# Patient Record
Sex: MALE | Race: OTHER | NOT HISPANIC OR LATINO | URBAN - METROPOLITAN AREA
[De-identification: names, ages, dates, MRNs, and addresses within clinical notes are randomized per-mention and may not be internally consistent; named-entity substitution may affect disease eponyms.]

---

## 2017-06-24 PROBLEM — Z00.00 ENCOUNTER FOR PREVENTIVE HEALTH EXAMINATION: Status: ACTIVE | Noted: 2017-06-24

## 2019-07-12 ENCOUNTER — INPATIENT (INPATIENT)
Facility: HOSPITAL | Age: 69
LOS: 4 days | Discharge: HOME | End: 2019-07-17
Attending: INTERNAL MEDICINE | Admitting: INTERNAL MEDICINE
Payer: MEDICAID

## 2019-07-12 VITALS
SYSTOLIC BLOOD PRESSURE: 122 MMHG | WEIGHT: 175.93 LBS | OXYGEN SATURATION: 99 % | TEMPERATURE: 97 F | HEART RATE: 48 BPM | RESPIRATION RATE: 20 BRPM | DIASTOLIC BLOOD PRESSURE: 61 MMHG

## 2019-07-12 DIAGNOSIS — Z95.1 PRESENCE OF AORTOCORONARY BYPASS GRAFT: Chronic | ICD-10-CM

## 2019-07-12 DIAGNOSIS — R09.89 OTHER SPECIFIED SYMPTOMS AND SIGNS INVOLVING THE CIRCULATORY AND RESPIRATORY SYSTEMS: ICD-10-CM

## 2019-07-12 LAB
ALBUMIN SERPL ELPH-MCNC: 3.7 G/DL — SIGNIFICANT CHANGE UP (ref 3.5–5.2)
ALP SERPL-CCNC: 82 U/L — SIGNIFICANT CHANGE UP (ref 30–115)
ALT FLD-CCNC: 19 U/L — SIGNIFICANT CHANGE UP (ref 0–41)
ANION GAP SERPL CALC-SCNC: 11 MMOL/L — SIGNIFICANT CHANGE UP (ref 7–14)
ANION GAP SERPL CALC-SCNC: 11 MMOL/L — SIGNIFICANT CHANGE UP (ref 7–14)
APPEARANCE UR: CLEAR — SIGNIFICANT CHANGE UP
APTT BLD: 29.8 SEC — SIGNIFICANT CHANGE UP (ref 27–39.2)
AST SERPL-CCNC: 16 U/L — SIGNIFICANT CHANGE UP (ref 0–41)
BACTERIA # UR AUTO: ABNORMAL
BASOPHILS # BLD AUTO: 0.03 K/UL — SIGNIFICANT CHANGE UP (ref 0–0.2)
BASOPHILS NFR BLD AUTO: 0.4 % — SIGNIFICANT CHANGE UP (ref 0–1)
BILIRUB SERPL-MCNC: 0.5 MG/DL — SIGNIFICANT CHANGE UP (ref 0.2–1.2)
BILIRUB UR-MCNC: NEGATIVE — SIGNIFICANT CHANGE UP
BUN SERPL-MCNC: 24 MG/DL — HIGH (ref 10–20)
BUN SERPL-MCNC: 32 MG/DL — HIGH (ref 10–20)
CALCIUM SERPL-MCNC: 8.6 MG/DL — SIGNIFICANT CHANGE UP (ref 8.5–10.1)
CALCIUM SERPL-MCNC: 9 MG/DL — SIGNIFICANT CHANGE UP (ref 8.5–10.1)
CHLORIDE SERPL-SCNC: 105 MMOL/L — SIGNIFICANT CHANGE UP (ref 98–110)
CHLORIDE SERPL-SCNC: 106 MMOL/L — SIGNIFICANT CHANGE UP (ref 98–110)
CO2 SERPL-SCNC: 26 MMOL/L — SIGNIFICANT CHANGE UP (ref 17–32)
CO2 SERPL-SCNC: 28 MMOL/L — SIGNIFICANT CHANGE UP (ref 17–32)
COLOR SPEC: SIGNIFICANT CHANGE UP
CREAT SERPL-MCNC: 0.8 MG/DL — SIGNIFICANT CHANGE UP (ref 0.7–1.5)
CREAT SERPL-MCNC: 1 MG/DL — SIGNIFICANT CHANGE UP (ref 0.7–1.5)
DIFF PNL FLD: ABNORMAL
EOSINOPHIL # BLD AUTO: 0.38 K/UL — SIGNIFICANT CHANGE UP (ref 0–0.7)
EOSINOPHIL NFR BLD AUTO: 4.9 % — SIGNIFICANT CHANGE UP (ref 0–8)
EPI CELLS # UR: SIGNIFICANT CHANGE UP /HPF (ref 0–5)
GLUCOSE BLDC GLUCOMTR-MCNC: 109 MG/DL — HIGH (ref 70–99)
GLUCOSE BLDC GLUCOMTR-MCNC: 110 MG/DL — HIGH (ref 70–99)
GLUCOSE SERPL-MCNC: 120 MG/DL — HIGH (ref 70–99)
GLUCOSE SERPL-MCNC: 92 MG/DL — SIGNIFICANT CHANGE UP (ref 70–99)
GLUCOSE UR QL: NEGATIVE — SIGNIFICANT CHANGE UP
HCT VFR BLD CALC: 37.8 % — LOW (ref 42–52)
HCT VFR BLD CALC: 42.1 % — SIGNIFICANT CHANGE UP (ref 42–52)
HGB BLD-MCNC: 12.4 G/DL — LOW (ref 14–18)
HGB BLD-MCNC: 14 G/DL — SIGNIFICANT CHANGE UP (ref 14–18)
IMM GRANULOCYTES NFR BLD AUTO: 0.1 % — SIGNIFICANT CHANGE UP (ref 0.1–0.3)
INR BLD: 1.03 RATIO — SIGNIFICANT CHANGE UP (ref 0.65–1.3)
KETONES UR-MCNC: NEGATIVE — SIGNIFICANT CHANGE UP
LEUKOCYTE ESTERASE UR-ACNC: NEGATIVE — SIGNIFICANT CHANGE UP
LYMPHOCYTES # BLD AUTO: 3.52 K/UL — HIGH (ref 1.2–3.4)
LYMPHOCYTES # BLD AUTO: 45 % — SIGNIFICANT CHANGE UP (ref 20.5–51.1)
MAGNESIUM SERPL-MCNC: 1.8 MG/DL — SIGNIFICANT CHANGE UP (ref 1.8–2.4)
MCHC RBC-ENTMCNC: 30.5 PG — SIGNIFICANT CHANGE UP (ref 27–31)
MCHC RBC-ENTMCNC: 30.8 PG — SIGNIFICANT CHANGE UP (ref 27–31)
MCHC RBC-ENTMCNC: 32.8 G/DL — SIGNIFICANT CHANGE UP (ref 32–37)
MCHC RBC-ENTMCNC: 33.3 G/DL — SIGNIFICANT CHANGE UP (ref 32–37)
MCV RBC AUTO: 92.7 FL — SIGNIFICANT CHANGE UP (ref 80–94)
MCV RBC AUTO: 92.9 FL — SIGNIFICANT CHANGE UP (ref 80–94)
MONOCYTES # BLD AUTO: 0.91 K/UL — HIGH (ref 0.1–0.6)
MONOCYTES NFR BLD AUTO: 11.6 % — HIGH (ref 1.7–9.3)
NEUTROPHILS # BLD AUTO: 2.98 K/UL — SIGNIFICANT CHANGE UP (ref 1.4–6.5)
NEUTROPHILS NFR BLD AUTO: 38 % — LOW (ref 42.2–75.2)
NITRITE UR-MCNC: NEGATIVE — SIGNIFICANT CHANGE UP
NRBC # BLD: 0 /100 WBCS — SIGNIFICANT CHANGE UP (ref 0–0)
NRBC # BLD: 0 /100 WBCS — SIGNIFICANT CHANGE UP (ref 0–0)
PH UR: 6.5 — SIGNIFICANT CHANGE UP (ref 5–8)
PLATELET # BLD AUTO: 179 K/UL — SIGNIFICANT CHANGE UP (ref 130–400)
PLATELET # BLD AUTO: 207 K/UL — SIGNIFICANT CHANGE UP (ref 130–400)
POTASSIUM SERPL-MCNC: 4.6 MMOL/L — SIGNIFICANT CHANGE UP (ref 3.5–5)
POTASSIUM SERPL-MCNC: 4.6 MMOL/L — SIGNIFICANT CHANGE UP (ref 3.5–5)
POTASSIUM SERPL-SCNC: 4.6 MMOL/L — SIGNIFICANT CHANGE UP (ref 3.5–5)
POTASSIUM SERPL-SCNC: 4.6 MMOL/L — SIGNIFICANT CHANGE UP (ref 3.5–5)
PROT SERPL-MCNC: 6.7 G/DL — SIGNIFICANT CHANGE UP (ref 6–8)
PROT UR-MCNC: NEGATIVE — SIGNIFICANT CHANGE UP
PROTHROM AB SERPL-ACNC: 11.8 SEC — SIGNIFICANT CHANGE UP (ref 9.95–12.87)
RBC # BLD: 4.07 M/UL — LOW (ref 4.7–6.1)
RBC # BLD: 4.54 M/UL — LOW (ref 4.7–6.1)
RBC # FLD: 12.6 % — SIGNIFICANT CHANGE UP (ref 11.5–14.5)
RBC # FLD: 12.6 % — SIGNIFICANT CHANGE UP (ref 11.5–14.5)
RBC CASTS # UR COMP ASSIST: SIGNIFICANT CHANGE UP /HPF (ref 0–4)
SODIUM SERPL-SCNC: 143 MMOL/L — SIGNIFICANT CHANGE UP (ref 135–146)
SODIUM SERPL-SCNC: 144 MMOL/L — SIGNIFICANT CHANGE UP (ref 135–146)
SP GR SPEC: 1.03 — HIGH (ref 1.01–1.02)
TROPONIN T SERPL-MCNC: <0.01 NG/ML — SIGNIFICANT CHANGE UP
UROBILINOGEN FLD QL: SIGNIFICANT CHANGE UP
WBC # BLD: 7.83 K/UL — SIGNIFICANT CHANGE UP (ref 4.8–10.8)
WBC # BLD: 8.52 K/UL — SIGNIFICANT CHANGE UP (ref 4.8–10.8)
WBC # FLD AUTO: 7.83 K/UL — SIGNIFICANT CHANGE UP (ref 4.8–10.8)
WBC # FLD AUTO: 8.52 K/UL — SIGNIFICANT CHANGE UP (ref 4.8–10.8)

## 2019-07-12 PROCEDURE — 71045 X-RAY EXAM CHEST 1 VIEW: CPT | Mod: 26

## 2019-07-12 PROCEDURE — 70450 CT HEAD/BRAIN W/O DYE: CPT | Mod: 26

## 2019-07-12 PROCEDURE — 64644 CHEMODENERV 1 EXTREM 5/> MUS: CPT

## 2019-07-12 PROCEDURE — 76937 US GUIDE VASCULAR ACCESS: CPT | Mod: 26

## 2019-07-12 PROCEDURE — 0042T: CPT

## 2019-07-12 PROCEDURE — 70450 CT HEAD/BRAIN W/O DYE: CPT | Mod: 26,77

## 2019-07-12 PROCEDURE — 99291 CRITICAL CARE FIRST HOUR: CPT

## 2019-07-12 PROCEDURE — 99285 EMERGENCY DEPT VISIT HI MDM: CPT

## 2019-07-12 RX ORDER — BISOPROLOL FUMARATE 10 MG/1
0 TABLET, FILM COATED ORAL
Qty: 30 | Refills: 0 | DISCHARGE

## 2019-07-12 RX ORDER — CHLORHEXIDINE GLUCONATE 213 G/1000ML
1 SOLUTION TOPICAL
Refills: 0 | Status: DISCONTINUED | OUTPATIENT
Start: 2019-07-12 | End: 2019-07-17

## 2019-07-12 RX ORDER — SODIUM CHLORIDE 9 MG/ML
250 INJECTION, SOLUTION INTRAVENOUS
Refills: 0 | Status: DISCONTINUED | OUTPATIENT
Start: 2019-07-12 | End: 2019-07-14

## 2019-07-12 RX ORDER — THIAMINE MONONITRATE (VIT B1) 100 MG
100 TABLET ORAL DAILY
Refills: 0 | Status: COMPLETED | OUTPATIENT
Start: 2019-07-12 | End: 2019-07-15

## 2019-07-12 RX ORDER — SPIRONOLACTONE 25 MG/1
0 TABLET, FILM COATED ORAL
Qty: 30 | Refills: 0 | DISCHARGE

## 2019-07-12 RX ORDER — PANTOPRAZOLE SODIUM 20 MG/1
40 TABLET, DELAYED RELEASE ORAL DAILY
Refills: 0 | Status: DISCONTINUED | OUTPATIENT
Start: 2019-07-12 | End: 2019-07-16

## 2019-07-12 RX ORDER — SODIUM CHLORIDE 9 MG/ML
500 INJECTION INTRAMUSCULAR; INTRAVENOUS; SUBCUTANEOUS ONCE
Refills: 0 | Status: COMPLETED | OUTPATIENT
Start: 2019-07-12 | End: 2019-07-12

## 2019-07-12 RX ORDER — ATORVASTATIN CALCIUM 80 MG/1
80 TABLET, FILM COATED ORAL AT BEDTIME
Refills: 0 | Status: DISCONTINUED | OUTPATIENT
Start: 2019-07-12 | End: 2019-07-16

## 2019-07-12 RX ORDER — CEFAZOLIN SODIUM 1 G
2000 VIAL (EA) INJECTION ONCE
Refills: 0 | Status: COMPLETED | OUTPATIENT
Start: 2019-07-12 | End: 2019-07-12

## 2019-07-12 RX ORDER — ASPIRIN/CALCIUM CARB/MAGNESIUM 324 MG
0 TABLET ORAL
Qty: 30 | Refills: 0 | DISCHARGE

## 2019-07-12 RX ORDER — SODIUM CHLORIDE 9 MG/ML
1000 INJECTION, SOLUTION INTRAVENOUS
Refills: 0 | Status: DISCONTINUED | OUTPATIENT
Start: 2019-07-12 | End: 2019-07-12

## 2019-07-12 RX ORDER — ATORVASTATIN CALCIUM 80 MG/1
0 TABLET, FILM COATED ORAL
Qty: 30 | Refills: 0 | DISCHARGE

## 2019-07-12 RX ORDER — SODIUM CHLORIDE 9 MG/ML
1000 INJECTION INTRAMUSCULAR; INTRAVENOUS; SUBCUTANEOUS
Refills: 0 | Status: DISCONTINUED | OUTPATIENT
Start: 2019-07-12 | End: 2019-07-12

## 2019-07-12 RX ORDER — SODIUM CHLORIDE 9 MG/ML
1000 INJECTION INTRAMUSCULAR; INTRAVENOUS; SUBCUTANEOUS
Refills: 0 | Status: DISCONTINUED | OUTPATIENT
Start: 2019-07-12 | End: 2019-07-14

## 2019-07-12 RX ORDER — ASPIRIN/CALCIUM CARB/MAGNESIUM 324 MG
325 TABLET ORAL DAILY
Refills: 0 | Status: DISCONTINUED | OUTPATIENT
Start: 2019-07-12 | End: 2019-07-17

## 2019-07-12 RX ADMIN — SODIUM CHLORIDE 75 MILLILITER(S): 9 INJECTION INTRAMUSCULAR; INTRAVENOUS; SUBCUTANEOUS at 21:01

## 2019-07-12 RX ADMIN — ATORVASTATIN CALCIUM 80 MILLIGRAM(S): 80 TABLET, FILM COATED ORAL at 21:01

## 2019-07-12 RX ADMIN — PANTOPRAZOLE SODIUM 40 MILLIGRAM(S): 20 TABLET, DELAYED RELEASE ORAL at 14:16

## 2019-07-12 RX ADMIN — SODIUM CHLORIDE 1000 MILLILITER(S): 9 INJECTION INTRAMUSCULAR; INTRAVENOUS; SUBCUTANEOUS at 21:04

## 2019-07-12 RX ADMIN — SODIUM CHLORIDE 75 MILLILITER(S): 9 INJECTION INTRAMUSCULAR; INTRAVENOUS; SUBCUTANEOUS at 14:15

## 2019-07-12 RX ADMIN — SODIUM CHLORIDE 12.5 MILLILITER(S): 9 INJECTION, SOLUTION INTRAVENOUS at 21:04

## 2019-07-12 RX ADMIN — Medication 100 MILLIGRAM(S): at 16:11

## 2019-07-12 NOTE — ED PROVIDER NOTE - OBJECTIVE STATEMENT
pt is a 68 yom w/ no pmhx BIBA for stroke sx. pt last known well is unknown. pt family heard thump ustairs, found pt in bathtub, could not ambulate afterwards, called 911. Danish speaking. pt has L facial droop, R gaze, L sided weakness.

## 2019-07-12 NOTE — ED PROVIDER NOTE - PHYSICAL EXAMINATION
CONSTITUTIONAL: Well-developed; well-nourished; in no acute distress.   SKIN: warm, dry  HEAD: Normocephalic; atraumatic.  EYES: PERRL, EOMI, normal sclera and conjunctiva   ENT: No nasal discharge; airway clear.  NECK: Supple; non tender.  CARD: S1, S2 normal; no murmurs, gallops, or rubs. Regular rate and rhythm.   RESP: No wheezes, rales or rhonchi.  ABD: soft ntnd  EXT: Normal ROM.  No clubbing, cyanosis or edema.   LYMPH: No acute cervical adenopathy.  NEURO: Alert, oriented. 3/5 strength in left upper and lower extremities, now resolving. L facial droop. slurred speech. sensation deficits on the L face. no cerebellar signs.  PSYCH: Cooperative, appropriate.

## 2019-07-12 NOTE — ED ADULT TRIAGE NOTE - CHIEF COMPLAINT QUOTE
As per EMS, patient fell 20 minutes ago. +facial droop noted. As per wife, patient altered. Stroke code called in ED. Patient Romansh speaking.

## 2019-07-12 NOTE — H&P ADULT - NSHPLABSRESULTS_GEN_ALL_CORE
14.0   7.83  )-----------( 207      ( 12 Jul 2019 07:10 )             42.1       07-12    144  |  105  |  32<H>  ----------------------------<  120<H>  4.6   |  28  |  1.0    Ca    9.0      12 Jul 2019 07:10    TPro  6.7  /  Alb  3.7  /  TBili  0.5  /  DBili  x   /  AST  16  /  ALT  19  /  AlkPhos  82  07-12        PT/INR - ( 12 Jul 2019 07:10 )   PT: 11.80 sec;   INR: 1.03 ratio         PTT - ( 12 Jul 2019 07:10 )  PTT:29.8 sec    Lactate Trend      CARDIAC MARKERS ( 12 Jul 2019 07:10 )  x     / <0.01 ng/mL / x     / x     / x          CAPILLARY BLOOD GLUCOSE  104 (12 Jul 2019 07:33)    POCT Blood Glucose.: 104 mg/dL (12 Jul 2019 07:14)      < from: CT Head No Cont (07.12.19 @ 11:04) >    IMPRESSION:     1.  Interval cerebral angiogram with residual IV contrast present. Mild   focal prominence of a right sylvian fissure branch vessel, likely   contrast staining in/around the vessel. Recommend a follow-up study.    2.  No evidence of acute large territory infarct.    < end of copied text >    < from: CT Perfusion w/ Maps w/ IV Cont (07.12.19 @ 07:55) >      Impression:    CTA head: Abrupt occlusion of the distal M1 segment of the right middle   cerebral artery compatible with thromboembolus.    CT perfusion: Large penumbra in the right MCA distribution.    CTA neck: No significant vascular stenosis in the neck.      < end of copied text >

## 2019-07-12 NOTE — ED PROVIDER NOTE - CLINICAL SUMMARY MEDICAL DECISION MAKING FREE TEXT BOX
Pt sx waxing and waning during ED stay with neuro at bedside. Family wanted to speak with PMD prior to consenting for thrombectomy, Dr Santos Phan spoke with PMD, pt went for procedure.

## 2019-07-12 NOTE — CONSULT NOTE ADULT - SUBJECTIVE AND OBJECTIVE BOX
Patient is a 68y old  Male who presents with a chief complaint of     HPI:  69 yo M w/ PMH CAD s/p CABG, CVA, DVT likely provoked BIBA for stroke sx. pt last known well is unknown. pt family heard thump ustairs, found pt in bathtub, could not ambulate afterwards, called 911. Mexican speaking. pt has L facial droop, R gaze, L sided weakness.  Patient underwent embolectomy for right MCA clot.     PAST MEDICAL & SURGICAL HISTORY:  DVT, lower extremity: January 2019, not currently on anticoagulation  Stroke: 10 years ago as per MD HALE (hyperlipidemia)  CAD (coronary artery disease): s/p stenting and CABG 10 years ago  S/P CABG (coronary artery bypass graft)    SOCIAL HX:   EtOH daily intake;     FAMILY HISTORY:  No pertinent family history in first degree relatives      Review of system:  See HPI    Allergies    No Known Allergies    Intolerances      PHYSICAL EXAM    ICU Vital Signs Last 24 Hrs  T(C): 35.1 (12 Jul 2019 12:30), Max: 36.4 (12 Jul 2019 07:30)  T(F): 95.2 (12 Jul 2019 12:30), Max: 97.5 (12 Jul 2019 07:30)  HR: 40 (12 Jul 2019 12:30) (40 - 55)  BP: 126/63 (12 Jul 2019 12:30) (120/61 - 126/63)  BP(mean): 98 (12 Jul 2019 12:30) (98 - 98)  RR: 16 (12 Jul 2019 12:30) (16 - 20)  SpO2: 100% (12 Jul 2019 12:30) (96% - 100%)    I&O's Detail    12 Jul 2019 07:01  -  12 Jul 2019 13:20  --------------------------------------------------------  IN:  Total IN: 0 mL    OUT:    Voided: 550 mL  Total OUT: 550 mL    Total NET: -550 mL    General: Comfortable in bed  HEENT:  On NC  Lymph node: No palpable LN             Lungs: CTA  Cardiovascular: RRR, S1S2  Abdomen: BS+ve; soft non tender  Extremities: No LE edema  Skin:  No evident Rash  Neurological:  AAOx3; Left facial droop; Left LE sensory deficit       LABS:                          14.0   7.83  )-----------( 207      ( 12 Jul 2019 07:10 )             42.1    07-12    144  |  105  |  32<H>  ----------------------------<  120<H>  4.6   |  28  |  1.0    Ca    9.0      12 Jul 2019 07:10    TPro  6.7  /  Alb  3.7  /  TBili  0.5  /  DBili  x   /  AST  16  /  ALT  19  /  AlkPhos  82  07-12      PT/INR - ( 12 Jul 2019 07:10 )   PT: 11.80 sec;   INR: 1.03 ratio         PTT - ( 12 Jul 2019 07:10 )  PTT:29.8 sec                                           CARDIAC MARKERS ( 12 Jul 2019 07:10 )  x     / <0.01 ng/mL / x     / x     / x        LIVER FUNCTIONS - ( 12 Jul 2019 07:10 )  Alb: 3.7 g/dL / Pro: 6.7 g/dL / ALK PHOS: 82 U/L / ALT: 19 U/L / AST: 16 U/L / GGT: x                MEDICATIONS  (STANDING):  aspirin 325 milliGRAM(s) Oral daily  atorvastatin 80 milliGRAM(s) Oral at bedtime  ceFAZolin   IVPB 2000 milliGRAM(s) IV Intermittent once  chlorhexidine 4% Liquid 1 Application(s) Topical <User Schedule>    MEDICATIONS  (PRN):    Radiology:  < from: CT Head No Cont (07.12.19 @ 11:04) >  IMPRESSION:     1.  Interval cerebral angiogram with residual IV contrast present. Mild   focal prominence of a right sylvian fissure branch vessel, likely   contrast staining in/around the vessel. Recommend a follow-up study.    2.  No evidence of acute large territory infarct.    < end of copied text > Patient is a 68y old  Male who presents with a chief complaint of     HPI:  67 yo M w/ PMH CAD s/p CABG, CVA, DVT likely provoked BIBA for stroke sx. pt last known well is unknown. pt family heard thump ustairs, found pt in bathtub, could not ambulate afterwards, called 911. Romanian speaking. pt has L facial droop, R gaze, L sided weakness.  Patient underwent embolectomy for right MCA clot.     PAST MEDICAL & SURGICAL HISTORY:  DVT, lower extremity: January 2019, not currently on anticoagulation  Stroke: 10 years ago as per MD HALE (hyperlipidemia)  CAD (coronary artery disease): s/p stenting and CABG 10 years ago  S/P CABG (coronary artery bypass graft)    SOCIAL HX:   EtOH daily intake;     FAMILY HISTORY:  No pertinent family history in first degree relatives      Review of system:  See HPI    Allergies    No Known Allergies    Intolerances      PHYSICAL EXAM    ICU Vital Signs Last 24 Hrs  T(C): 35.1 (12 Jul 2019 12:30), Max: 36.4 (12 Jul 2019 07:30)  T(F): 95.2 (12 Jul 2019 12:30), Max: 97.5 (12 Jul 2019 07:30)  HR: 40 (12 Jul 2019 12:30) (40 - 55)  BP: 126/63 (12 Jul 2019 12:30) (120/61 - 126/63)  BP(mean): 98 (12 Jul 2019 12:30) (98 - 98)  RR: 16 (12 Jul 2019 12:30) (16 - 20)  SpO2: 100% (12 Jul 2019 12:30) (96% - 100%)    I&O's Detail    12 Jul 2019 07:01  -  12 Jul 2019 13:20  --------------------------------------------------------  IN:  Total IN: 0 mL    OUT:    Voided: 550 mL  Total OUT: 550 mL    Total NET: -550 mL    General: Comfortable in bed  HEENT:  On NC  Lymph node: No palpable LN             Lungs: CTA  Cardiovascular: RRR, S1S2  Abdomen: BS+ve; soft non tender  Extremities: No LE edema  Skin:  No evident Rash  Neurological:  AAOx3; Left facial droop; Left LE/UE sensory deficit       LABS:                          14.0   7.83  )-----------( 207      ( 12 Jul 2019 07:10 )             42.1    07-12    144  |  105  |  32<H>  ----------------------------<  120<H>  4.6   |  28  |  1.0    Ca    9.0      12 Jul 2019 07:10    TPro  6.7  /  Alb  3.7  /  TBili  0.5  /  DBili  x   /  AST  16  /  ALT  19  /  AlkPhos  82  07-12      PT/INR - ( 12 Jul 2019 07:10 )   PT: 11.80 sec;   INR: 1.03 ratio         PTT - ( 12 Jul 2019 07:10 )  PTT:29.8 sec                                           CARDIAC MARKERS ( 12 Jul 2019 07:10 )  x     / <0.01 ng/mL / x     / x     / x        LIVER FUNCTIONS - ( 12 Jul 2019 07:10 )  Alb: 3.7 g/dL / Pro: 6.7 g/dL / ALK PHOS: 82 U/L / ALT: 19 U/L / AST: 16 U/L / GGT: x                MEDICATIONS  (STANDING):  aspirin 325 milliGRAM(s) Oral daily  atorvastatin 80 milliGRAM(s) Oral at bedtime  ceFAZolin   IVPB 2000 milliGRAM(s) IV Intermittent once  chlorhexidine 4% Liquid 1 Application(s) Topical <User Schedule>    MEDICATIONS  (PRN):    Radiology:  < from: CT Head No Cont (07.12.19 @ 11:04) >  IMPRESSION:     1.  Interval cerebral angiogram with residual IV contrast present. Mild   focal prominence of a right sylvian fissure branch vessel, likely   contrast staining in/around the vessel. Recommend a follow-up study.    2.  No evidence of acute large territory infarct.    < end of copied text >

## 2019-07-12 NOTE — PHARMACOTHERAPY INTERVENTION NOTE - COMMENTS
Doctor Car was contacted to correct magnesium drip from 1 liter of NS with 250mg of Magnesium to 250 ml NS with 10 grams of Magnesium.

## 2019-07-12 NOTE — ED ADULT NURSE NOTE - OBJECTIVE STATEMENT
BIBA c/o unwitnessed fall , code stroke was called for + left sided weakness per EMS report .Upon ED arrival , no weakness noted , no arm drift , no leg drift ,+ equal strength  . Pt speaks Indian .Pt awake , alert , oriented to name , place , date of birth , no facial droop noted , speech is clear Per pts family , last time known well was about 2130 last night .Today at about 0620 am , pt went to the bathroom and had an unwitnessed fall. No grimaced face noted , no labored breathing noted , chest rise symmetrical , no SOB , no vomiting noted . BIBA c/o unwitnessed fall , code stroke was called for + left sided weakness per EMS report .Upon ED arrival , no weakness noted , no arm drift , no leg drift ,+ equal strength  . Pt speaks Swedish .Pt awake , alert , oriented to name , place , date of birth . Per pts family , last time known well was about 2130 last night .Today at about 0620 am , pt went to the bathroom and had an unwitnessed fall. No grimaced face noted , no labored breathing noted , chest rise symmetrical , no SOB , no vomiting noted , denies nausea, denies dizziness , Neuro MD at bedside BIBA c/o unwitnessed fall , code stroke was called for + left sided weakness per EMS report .Upon ED arrival , no weakness noted , no arm drift , no leg drift ,+ equal strength  . Pt speaks Bermudian .Pt awake , alert , oriented to name , place , date of birth . Per pts family , last time known well was about 2130 last night .Today at about 0620 am , pt went to the bathroom and had an unwitnessed fall. No grimaced face noted , no labored breathing noted , chest rise symmetrical , no SOB , no vomiting noted , denies nausea, denies dizziness ,disoriented to situation ,unable to recall events /fall episode at home , MD aware . Neuro MD at bedside

## 2019-07-12 NOTE — ED PROVIDER NOTE - NS ED ROS FT
Constitutional: See HPI.  Eyes: No visual changes, eye pain or discharge. No Photophobia  ENMT: No hearing changes, pain, discharge or infections. No neck pain or stiffness. No limited ROM  Cardiac: No SOB or edema. No chest pain with exertion.  Respiratory: No cough or respiratory distress. No hemoptysis. No history of asthma or RAD.  GI: No nausea, vomiting, diarrhea or abdominal pain.  : No dysuria, frequency or burning. No Discharge  MS: No myalgia, muscle weakness, joint pain or back pain.  Neuro: No headache. +weakness, +slurred speech  Skin: No skin rash.  Except as documented in the HPI, all other systems are negative.

## 2019-07-12 NOTE — ED ADULT NURSE NOTE - NSIMPLEMENTINTERV_GEN_ALL_ED
Implemented All Fall Risk Interventions:  [place] to call system. Call bell, personal items and telephone within reach. Instruct patient to call for assistance. Room bathroom lighting operational. Non-slip footwear when patient is off stretcher. Physically safe environment: no spills, clutter or unnecessary equipment. Stretcher in lowest position, wheels locked, appropriate side rails in place. Provide visual cue, wrist band, yellow gown, etc. Monitor gait and stability. Monitor for mental status changes and reorient to person, place, and time. Review medications for side effects contributing to fall risk. Reinforce activity limits and safety measures with patient and family.

## 2019-07-12 NOTE — PROGRESS NOTE ADULT - SUBJECTIVE AND OBJECTIVE BOX
Vascular & Interventional Radiology Pre-and post Procedure Note    Procedure Name: RMCA mechanical thrombectomy    HPI: HPI:  Mr. Lomeli is a 69 yo male patient, with a PMH of CAD s/p stenting and CABG 10 years ago, history of CVA more than 10 years ago with minimal residual left upper extremity weakness, lower extremities DVT in January 2019 when he was in [facility] treated for a short period of time with s/c injections and oral anticoagulation, on aspirin, functional at baseline, was brought in by his family due to unwitnessed fall this AM in the bathroom. LKW is 10:30 PM yesterday. Initially pt presented with right gaze and left sided weakness which improved upon presentation.    In ED, code stroke was called, he was evaluated by the neurology service, NIHSS of 4. No TPA as LKW >4.5 hours. CTP (+) distal M1 occlusion w large penumbra, code NI activated. Post CTA/CTP his NIHSS was 7-8. Delay in neurointervention inintially as family was not agreeing to intervention. Patient then transferred to  IR suite for mechanical thrombectomy. (12 Jul 2019 13:00)      Allergies:   Medications (Abx/Cardiac/Anticoagulation/Blood Products)      Data:    79.8  T(C): 35.1  HR: 46  BP: 100/55  RR: 11  SpO2: 100%    Exam  General: NAD, AAO x3, no distress  Chest: breathing comfortably on room air, CTAB  Abdomen: soft, non-tender, non- distended   Extremities: positive pulses bilaterally x4    Radiology & Additional Studies: Radiology imaging reviewed.     Labs:   -WBC 7.83 / HgB 14.0 / Hct 42.1 / Plt 207  -Na 144 / Cl 105 / BUN 32 / Glucose 120  -K 4.6 / CO2 28 / Cr 1.0  -ALT 19 / Alk Phos 82 / T.Bili 0.5  -INR1.03      EKG completed (date):       Plan:   -68y Male presents for RMCA thrombectomy  -Risks/Benefits/alternatives explained with the patient's healthcare proxy and witnessed informed consent obtained.       Procedure:  R MCA thrombectomy    Pre-Op Diagnosis:  ACute CVA    Post-Op Diagnosis:  Same    Attending: Rylee  Resident: none    Anesthesia (type):  [ ] General Anesthesia  [x ] Sedation  w anesthesiologist  [ ] Spinal Anesthesia  [ ] Local/Regional    Contrast:  Visipaque 100cc    Estimated Blood Loss:  100cc    Condition:   [ ] Critical  [x ] Serious  [ ] Fair   [ ] Good    Findings/Follow up Plan of Care:  Acute R MCA occlusion.  4 passes with combination of aspiration and stent retriever with TICI 3 realized.    Specimens Removed:  Fresh thrombus    Implants:  NA    Complications:  none immediate    Disposition:  NCCT then ICU.      Please call Interventional Radiology [phone] with any questions, concerns, or issues.

## 2019-07-12 NOTE — SWALLOW BEDSIDE ASSESSMENT ADULT - COMMENTS
dysphagia evaluation conducted bedside. pt received alert, verbally responsive. +nc. +dysarthria of speech, +left facial droop. RN Erica, wife present bedside. no c/o pain. mild oral dysphagia  +cough post intake mild oral dysphagia with no overt s/s of aspiration vs penetration

## 2019-07-12 NOTE — H&P ADULT - NSICDXPASTMEDICALHX_GEN_ALL_CORE_FT
PAST MEDICAL HISTORY:  CAD (coronary artery disease) s/p stenting and CABG 10 years ago    DVT, lower extremity January 2019, not currently on anticoagulation    HLD (hyperlipidemia)     Stroke 10 years ago as per MD

## 2019-07-12 NOTE — ED ADULT NURSE NOTE - PMH
CAD (coronary artery disease)    HLD (hyperlipidemia)    HTN (hypertension)    Stroke  10 years ago as per MD

## 2019-07-12 NOTE — CONSULT NOTE ADULT - ASSESSMENT
IMPRESSION:  Ischemic right MCA CVA s/p thrombectomy   HO CVA     PLAN:    CNS: Neuro checks q 1; Repeat CT-H in 24 hrs or earlier if worsening status; ASA, Lipitor per neuro; Thiamine, folate; CIWA protocol;     HEENT: Oral care     PULMONARY: HOB at 30 degrees; Cxr    CARDIOVASCULAR: Keep I=O; NS at 75 cc/hr; TTE     GI: GI prophylaxis.  Feeding once pass S/S    RENAL: FU lytes; Check Mg, Keep 2-3;     INFECTIOUS DISEASE: Panculture     HEMATOLOGICAL:  DVT prophylaxis--> SCD;     ENDOCRINE:  Follow up FS.  Insulin protocol if needed.    MUSCULOSKELETAL: Bedrest for now     MICU monitoring IMPRESSION:  Ischemic right MCA CVA s/p thrombectomy   HO CVA   HO CAD s/p CABG    PLAN:    CNS: Neuro checks q 1; Repeat CT-H in 24 hrs or earlier if worsening status; ASA, Lipitor per neuro; Thiamine, folate; CIWA protocol;     HEENT: Oral care     PULMONARY: HOB at 30 degrees; Cxr    CARDIOVASCULAR: Keep I=O; NS at 75 cc/hr; TTE     GI: GI prophylaxis.  Feeding once pass S/S    RENAL: FU lytes; Check Mg, Keep 2-3;     INFECTIOUS DISEASE: Panculture     HEMATOLOGICAL:  DVT prophylaxis--> SCD;     ENDOCRINE:  Follow up FS.  Insulin protocol if needed.    MUSCULOSKELETAL: Bedrest for now     MICU monitoring

## 2019-07-12 NOTE — H&P ADULT - NSHPPHYSICALEXAM_GEN_ALL_CORE
ICU Vital Signs Last 24 Hrs  T(C): 35.1 (12 Jul 2019 12:30), Max: 36.4 (12 Jul 2019 07:30)  T(F): 95.2 (12 Jul 2019 12:30), Max: 97.5 (12 Jul 2019 07:30)  HR: 40 (12 Jul 2019 12:30) (40 - 55)  BP: 126/63 (12 Jul 2019 12:30) (120/61 - 126/63)  BP(mean): 98 (12 Jul 2019 12:30) (98 - 98)  RR: 16 (12 Jul 2019 12:30) (16 - 20)  SpO2: 100% (12 Jul 2019 12:30) (96% - 100%)    PHYSICAL EXAM:  GENERAL: NAD, speaks in full sentences, no signs of respiratory distress  HEAD:  Atraumatic, Normocephalic  EYES: EOMI, PERRLA, conjunctiva and sclera clear  NECK: Supple, No JVD  CHEST/LUNG: Clear to auscultation bilaterally; No wheeze; No crackles; No accessory muscles used  HEART: Regular rate and rhythm; No murmurs;   ABDOMEN: Soft, Nontender, Nondistended; Bowel sounds present; No guarding  EXTREMITIES:  2+ Peripheral Pulses, No cyanosis or edema  PSYCH: AAOx3  NEUROLOGY: non-focal  SKIN: No rashes or lesions ICU Vital Signs Last 24 Hrs  T(C): 35.1 (12 Jul 2019 12:30), Max: 36.4 (12 Jul 2019 07:30)  T(F): 95.2 (12 Jul 2019 12:30), Max: 97.5 (12 Jul 2019 07:30)  HR: 40 (12 Jul 2019 12:30) (40 - 55)  BP: 126/63 (12 Jul 2019 12:30) (120/61 - 126/63)  BP(mean): 98 (12 Jul 2019 12:30) (98 - 98)  RR: 16 (12 Jul 2019 12:30) (16 - 20)  SpO2: 100% (12 Jul 2019 12:30) (96% - 100%)    PHYSICAL EXAM:  GENERAL: NAD, speaks few words, no signs of respiratory distress  HEAD:  Atraumatic, Normocephalic  EYES: conjunctiva and sclera clear  NECK: Supple  CHEST/LUNG: Clear to auscultation bilaterally; No wheeze; No crackles; No accessory muscles used  HEART: Regular rate and rhythm; bradycardic, no murmurs   ABDOMEN: Soft, Nontender, Nondistended; no guarding  EXTREMITIES:  2+ Peripheral Pulses, No cyanosis or edema  NEUROLOGY: awake, alert, oriented *2. LUE 3/5, LLE 3/5. Decreased sensation over left facial, upper and lower extremity  SKIN: No rashes or lesions

## 2019-07-12 NOTE — H&P ADULT - HISTORY OF PRESENT ILLNESS
Mr. Lomeli is a 69 yo male patient, with a PMH of CAD s/p stenting and CABG 10 years ago, history of CVA more than 10 years ago with minimal residual left upper extremity weakness, lower extremities DVT in January 2019 when he was in [facility] treated for a short period of time with s/c injections and oral anticoagulation, on aspirin, functional at baseline, was brought in by his family due to unwitnessed fall this AM in the bathroom. LKW is 10:30 PM yesterday. Initially pt presented with right gaze and left sided weakness which improved upon presentation.    In ED, code stroke was called, he was evaluated by the neurology service, NIHSS of 4. No TPA as LKW >4.5 hours. CTP (+) distal M1 occlusion w large penumbra, code NI activated. Post CTA/CTP his NIHSS was 7. Delay in neurointervention inintially as family was not agreeing to intervention. Patient then transferred to  IR suite for mechanical thrombectomy.

## 2019-07-12 NOTE — ED PROVIDER NOTE - ATTENDING CONTRIBUTION TO CARE
67yo man h/o HTN, HLD, CAD, CVA BIBA with CVA sx inc L facial droop, L neglect vs R gaze pref, L sided weakness upper and lower extrem. Initially pt nonverbal, family reports he got up to go to the bathroom, they heard thump and found him in bathtub with above sx. On arrival pt alert, following simple commands. During ED stay sx improved somewhat, facial improved, weakness resolved, and speech returned though still slurred. Pt was seen as stroke code by neuro Dr Santos Phan, CT/CTA suggestive of LVO; pt went emergently for intervention and admitted to stroke unit.

## 2019-07-12 NOTE — SWALLOW BEDSIDE ASSESSMENT ADULT - SLP PERTINENT HISTORY OF CURRENT PROBLEM
Mr. Lomeli is a 69 yo male patient, with a PMH of CAD s/p stenting and CABG 10 years ago, history of CVA more than 10 years ago came in R MCA, manual thrombectomy. no TPA

## 2019-07-12 NOTE — H&P ADULT - ASSESSMENT
Mr. Lomeli is a 67 yo male patient, with a PMH of CAD s/p stenting and CABG 10 years ago, history of CVA more than 10 years ago with minimal residual left upper extremity weakness, lower extremities provoked DVT in January 2019 when he was in [facility], currently on aspirin 81 mg daily only, functional at baseline, was brought in by his family due to unwitnessed fall this AM in the bathroom.  NIHSS of 4. No TPA as LKW >4.5 hours. CTP (+) distal M1 occlusion w large penumbra, code NI activated. Post CTA/CTP his NIHSS was 7.     # Right Distal M1 Occlusion with large penumbra  - Outside of TPA window.  - S/p mechanical thrombectomy by neuro IR.  - Developed while on aspirin. Neuro recommend aspirin 325 mg daily + atorvastatin at this time  - Neurochecks Q 1 hour in ICU  - Repeat CT head in 24 hours and earlier if neuro changes  - Keep -140. Holding home enalapril, aldactone and bisoprolol   - NSS 75 cc/hour  - Magnesium infusion + serum magnesium levels Q 12 hours  - NPO, speech and swallow evaluation.  - Bed rest for 24 hours then PT/rehab evaluation.    # CAD  - S/p PCI and CABG 10 years ago.  - No history of heart failure  - Will check TTE  - Will continue aspirin + atorvastatin. BB and ACE inh on hold.    # Provoked DVT  - in January 2019, of anticoagulation  - Will obtain bilateral LE dupplex    Pantoprazole IV  SCD for DVT prophylaxis  NPO until evaluated by speech and swallow Mr. Lomeli is a 67 yo male patient, with a PMH of CAD s/p stenting and CABG 10 years ago, history of CVA more than 10 years ago with minimal residual left upper extremity weakness, lower extremities provoked DVT in January 2019 when he was in [facility], currently on aspirin 81 mg daily only, functional at baseline, was brought in by his family due to unwitnessed fall this AM in the bathroom.  NIHSS of 4. No TPA as LKW >4.5 hours. CTP (+) distal M1 occlusion w large penumbra, code NI activated. Post CTA/CTP his NIHSS was 7.     # Right Distal M1 Occlusion with large penumbra  - Outside of TPA window.  - S/p mechanical thrombectomy by neuro IR.  - Developed while on aspirin. Neuro recommend aspirin 325 mg daily + atorvastatin at this time  - Neurochecks Q 1 hour in ICU  - Repeat CT head in 24 hours and earlier if neuro changes  - Keep -140. Holding home enalapril, aldactone and bisoprolol   - NSS 75 cc/hour  - Magnesium infusion + serum magnesium levels Q 12 hours  - NPO, speech and swallow evaluation.  - Bed rest for 24 hours then PT/rehab evaluation.    # CAD  - S/p PCI and CABG 10 years ago.  - No history of heart failure  - Will check TTE  - Will continue aspirin + atorvastatin. BB and ACE inh on hold.    # Provoked DVT  - in January 2019, of anticoagulation  - Will obtain bilateral LE dupplex    # Possible thiamine deficiency  - patient drinking 25mg of scotch daily  - will start thiamine 100 mg IV daily for 3 days    Pantoprazole IV  SCD for DVT prophylaxis  NPO until evaluated by speech and swallow

## 2019-07-12 NOTE — CONSULT NOTE ADULT - ASSESSMENT
Impression:  69 y/o man with PMH CAD, stroke presenting s/p unwitnessed fall this AM in the bathroom. LKW is 10:30 PM yesterday. Initially pt presented with right gaze and left sided weakness which resolved upon presentation. Impression:  69 y/o man with PMH CAD, stroke presenting s/p unwitnessed fall this AM in the bathroom. LKW is 10:30 PM yesterday. Initially pt presented with right gaze and left sided weakness which resolved upon presentation. Exam difficult due to language barrier even with  which limits validity of NIHSS.    Plan:  ASA 325mg x1  Lipitor 80 mg x1  F/u on CTP result Impression:  67 y/o man with PMH CAD, stroke presenting s/p unwitnessed fall this AM in the bathroom. LKW is 10:30 PM yesterday. Initially pt presented with right gaze and left sided weakness which resolved upon presentation but has fluctuated. Current NIHSS is 4.    Plan:  No TPA as LKW >4.5 hours  ASA 325mg x1  Lipitor 80 mg x1    addendum:  CTP (+) distal M1 occlusion w large penumbra --code NI activated

## 2019-07-12 NOTE — ED ADULT NURSE NOTE - CHIEF COMPLAINT QUOTE
As per EMS, patient fell 20 minutes ago. +facial droop noted. As per wife, patient altered. Stroke code called in ED. Patient Amharic speaking.

## 2019-07-12 NOTE — SWALLOW BEDSIDE ASSESSMENT ADULT - ORAL PHASE
Delayed oral transit time/Decreased anterior-posterior movement of the bolus Delayed oral transit time

## 2019-07-13 LAB
ANION GAP SERPL CALC-SCNC: 8 MMOL/L — SIGNIFICANT CHANGE UP (ref 7–14)
BASOPHILS # BLD AUTO: 0.02 K/UL — SIGNIFICANT CHANGE UP (ref 0–0.2)
BASOPHILS NFR BLD AUTO: 0.3 % — SIGNIFICANT CHANGE UP (ref 0–1)
BUN SERPL-MCNC: 16 MG/DL — SIGNIFICANT CHANGE UP (ref 10–20)
CALCIUM SERPL-MCNC: 7.9 MG/DL — LOW (ref 8.5–10.1)
CHLORIDE SERPL-SCNC: 108 MMOL/L — SIGNIFICANT CHANGE UP (ref 98–110)
CHOLEST SERPL-MCNC: 126 MG/DL — SIGNIFICANT CHANGE UP (ref 100–200)
CO2 SERPL-SCNC: 24 MMOL/L — SIGNIFICANT CHANGE UP (ref 17–32)
CREAT SERPL-MCNC: 0.9 MG/DL — SIGNIFICANT CHANGE UP (ref 0.7–1.5)
EOSINOPHIL # BLD AUTO: 0.32 K/UL — SIGNIFICANT CHANGE UP (ref 0–0.7)
EOSINOPHIL NFR BLD AUTO: 4.3 % — SIGNIFICANT CHANGE UP (ref 0–8)
ESTIMATED AVERAGE GLUCOSE: 128 MG/DL — HIGH (ref 68–114)
GLUCOSE BLDC GLUCOMTR-MCNC: 108 MG/DL — HIGH (ref 70–99)
GLUCOSE BLDC GLUCOMTR-MCNC: 118 MG/DL — HIGH (ref 70–99)
GLUCOSE SERPL-MCNC: 119 MG/DL — HIGH (ref 70–99)
HBA1C BLD-MCNC: 6.1 % — HIGH (ref 4–5.6)
HCT VFR BLD CALC: 35.3 % — LOW (ref 42–52)
HDLC SERPL-MCNC: 43 MG/DL — SIGNIFICANT CHANGE UP
HGB BLD-MCNC: 11.9 G/DL — LOW (ref 14–18)
IMM GRANULOCYTES NFR BLD AUTO: 0.4 % — HIGH (ref 0.1–0.3)
LIPID PNL WITH DIRECT LDL SERPL: 72 MG/DL — SIGNIFICANT CHANGE UP (ref 4–129)
LYMPHOCYTES # BLD AUTO: 2.01 K/UL — SIGNIFICANT CHANGE UP (ref 1.2–3.4)
LYMPHOCYTES # BLD AUTO: 26.9 % — SIGNIFICANT CHANGE UP (ref 20.5–51.1)
MAGNESIUM SERPL-MCNC: 2.8 MG/DL — HIGH (ref 1.8–2.4)
MCHC RBC-ENTMCNC: 30.8 PG — SIGNIFICANT CHANGE UP (ref 27–31)
MCHC RBC-ENTMCNC: 33.7 G/DL — SIGNIFICANT CHANGE UP (ref 32–37)
MCV RBC AUTO: 91.5 FL — SIGNIFICANT CHANGE UP (ref 80–94)
MONOCYTES # BLD AUTO: 0.78 K/UL — HIGH (ref 0.1–0.6)
MONOCYTES NFR BLD AUTO: 10.4 % — HIGH (ref 1.7–9.3)
NEUTROPHILS # BLD AUTO: 4.32 K/UL — SIGNIFICANT CHANGE UP (ref 1.4–6.5)
NEUTROPHILS NFR BLD AUTO: 57.7 % — SIGNIFICANT CHANGE UP (ref 42.2–75.2)
NRBC # BLD: 0 /100 WBCS — SIGNIFICANT CHANGE UP (ref 0–0)
PLATELET # BLD AUTO: 163 K/UL — SIGNIFICANT CHANGE UP (ref 130–400)
POTASSIUM SERPL-MCNC: 4.4 MMOL/L — SIGNIFICANT CHANGE UP (ref 3.5–5)
POTASSIUM SERPL-SCNC: 4.4 MMOL/L — SIGNIFICANT CHANGE UP (ref 3.5–5)
RBC # BLD: 3.86 M/UL — LOW (ref 4.7–6.1)
RBC # FLD: 12.6 % — SIGNIFICANT CHANGE UP (ref 11.5–14.5)
SODIUM SERPL-SCNC: 140 MMOL/L — SIGNIFICANT CHANGE UP (ref 135–146)
TOTAL CHOLESTEROL/HDL RATIO MEASUREMENT: 2.9 RATIO — LOW (ref 4–5.5)
TRIGL SERPL-MCNC: 104 MG/DL — SIGNIFICANT CHANGE UP (ref 10–149)
WBC # BLD: 7.48 K/UL — SIGNIFICANT CHANGE UP (ref 4.8–10.8)
WBC # FLD AUTO: 7.48 K/UL — SIGNIFICANT CHANGE UP (ref 4.8–10.8)

## 2019-07-13 PROCEDURE — 99233 SBSQ HOSP IP/OBS HIGH 50: CPT

## 2019-07-13 PROCEDURE — 70450 CT HEAD/BRAIN W/O DYE: CPT | Mod: 26

## 2019-07-13 PROCEDURE — 93970 EXTREMITY STUDY: CPT | Mod: 26

## 2019-07-13 RX ORDER — SODIUM CHLORIDE 9 MG/ML
500 INJECTION INTRAMUSCULAR; INTRAVENOUS; SUBCUTANEOUS ONCE
Refills: 0 | Status: COMPLETED | OUTPATIENT
Start: 2019-07-13 | End: 2019-07-13

## 2019-07-13 RX ORDER — PHENYLEPHRINE HYDROCHLORIDE 10 MG/ML
0.1 INJECTION INTRAVENOUS
Qty: 40 | Refills: 0 | Status: DISCONTINUED | OUTPATIENT
Start: 2019-07-13 | End: 2019-07-13

## 2019-07-13 RX ADMIN — Medication 325 MILLIGRAM(S): at 12:16

## 2019-07-13 RX ADMIN — ATORVASTATIN CALCIUM 80 MILLIGRAM(S): 80 TABLET, FILM COATED ORAL at 21:02

## 2019-07-13 RX ADMIN — PANTOPRAZOLE SODIUM 40 MILLIGRAM(S): 20 TABLET, DELAYED RELEASE ORAL at 12:17

## 2019-07-13 RX ADMIN — SODIUM CHLORIDE 1000 MILLILITER(S): 9 INJECTION INTRAMUSCULAR; INTRAVENOUS; SUBCUTANEOUS at 02:54

## 2019-07-13 RX ADMIN — Medication 100 MILLIGRAM(S): at 16:56

## 2019-07-13 RX ADMIN — CHLORHEXIDINE GLUCONATE 1 APPLICATION(S): 213 SOLUTION TOPICAL at 05:31

## 2019-07-13 NOTE — CONSULT NOTE ADULT - SUBJECTIVE AND OBJECTIVE BOX
Neurology Consult    Patient is a 68y old  Male who presents with a chief complaint of Fall + facial droop (2019 15:47)      HPI:  Mr. Lomeli is a 67 yo male patient, with a PMH of CAD s/p stenting and CABG 10 years ago, history of CVA more than 10 years ago with minimal residual left upper extremity weakness, lower extremities DVT in 2019 when he was in [facility] treated for a short period of time with s/c injections and oral anticoagulation, on aspirin, functional at baseline, was brought in by his family due to unwitnessed fall this AM in the bathroom. LKW is 10:30 PM yesterday. Initially pt presented with right gaze and left sided weakness which improved upon presentation.    In ED, code stroke was called, he was evaluated by the neurology service, NIHSS of 4. No TPA as LKW >4.5 hours. CTP (+) distal M1 occlusion w large penumbra, code NI activated. Post CTA/CTP his NIHSS was 7. Delay in neurointervention inintially as family was not agreeing to intervention. Patient then transferred to  IR suite for mechanical thrombectomy. (2019 13:00)    Interval Hx; post op checks      PAST MEDICAL & SURGICAL HISTORY:  DVT, lower extremity: 2019, not currently on anticoagulation  Stroke: 10 years ago as per MD HALE (hyperlipidemia)  CAD (coronary artery disease): s/p stenting and CABG 10 years ago  S/P CABG (coronary artery bypass graft)      FAMILY HISTORY:  No pertinent family history in first degree relatives      Social History: (-) x 3    Allergies    No Known Allergies    Intolerances        MEDICATIONS  (STANDING):  aspirin 325 milliGRAM(s) Oral daily  atorvastatin 80 milliGRAM(s) Oral at bedtime  chlorhexidine 4% Liquid 1 Application(s) Topical <User Schedule>  pantoprazole  Injectable 40 milliGRAM(s) IV Push daily  sodium chloride 0.9% 250 milliLiter(s) (12.5 mL/Hr) IV Continuous <Continuous>  sodium chloride 0.9%. 1000 milliLiter(s) (75 mL/Hr) IV Continuous <Continuous>  thiamine Injectable 100 milliGRAM(s) IV Push daily    MEDICATIONS  (PRN):      Review of systems:    Constitutional: No fever, weight loss or fatigue    Eyes: No eye pain or discharge  ENMT:  No difficulty hearing; No sinus or throat pain  Neck: No pain or stiffness  Respiratory: No cough, wheezing, chills or hemoptysis  Cardiovascular: No chest pain, palpitations, shortness of breath, dyspnea on exertion  Gastrointestinal: No abdominal pain, nausea, vomiting or hematemesis; No diarrhea or constipation.   Genitourinary: No dysuria, frequency, hematuria or incontinence  Neurological: As per HPI  Skin: No rashes or lesions   Endocrine: No heat or cold intolerance; No hair loss  Musculoskeletal: No joint pain or swelling  Psychiatric: No depression, anxiety, mood swings  Heme/Lymph: No easy bruising or bleeding gums    Vital Signs Last 24 Hrs  T(C): 35.8 (2019 20:00), Max: 36.4 (2019 07:30)  T(F): 96.5 (2019 20:00), Max: 97.5 (2019 07:30)  HR: 50 (2019 02:45) (38 - 62)  BP: 107/57 (2019 02:45) (90/53 - 127/66)  BP(mean): 77 (2019 02:45) (59 - 100)  RR: 19 (2019 02:45) (7 - 28)  SpO2: 99% (2019 02:45) (96% - 100%)    Neurologic Examination:  General:  Appearance is consistent with chronologic age.  No abnormal facies.   Awake Alert Orientedx 3  Mild left facial  Nova x 4 No drift  Sensory; grossly intact to light touch  Right groin fl;at c/d/i                CBC Full  -  ( 2019 16:28 )  WBC Count : 8.52 K/uL  RBC Count : 4.07 M/uL  Hemoglobin : 12.4 g/dL  Hematocrit : 37.8 %  Platelet Count - Automated : 179 K/uL  Mean Cell Volume : 92.9 fL  Mean Cell Hemoglobin : 30.5 pg  Mean Cell Hemoglobin Concentration : 32.8 g/dL  Auto Neutrophil # : x  Auto Lymphocyte # : x  Auto Monocyte # : x  Auto Eosinophil # : x  Auto Basophil # : x  Auto Neutrophil % : x  Auto Lymphocyte % : x  Auto Monocyte % : x  Auto Eosinophil % : x  Auto Basophil % : x    -12    143  |  106  |  24<H>  ----------------------------<  92  4.6   |  26  |  0.8    Ca    8.6      2019 16:28  Mg     1.8     07-12    TPro  6.7  /  Alb  3.7  /  TBili  0.5  /  DBili  x   /  AST  16  /  ALT  19  /  AlkPhos  82  07-12    LIVER FUNCTIONS - ( 2019 07:10 )  Alb: 3.7 g/dL / Pro: 6.7 g/dL / ALK PHOS: 82 U/L / ALT: 19 U/L / AST: 16 U/L / GGT: x           PT/INR - ( 2019 07:10 )   PT: 11.80 sec;   INR: 1.03 ratio         PTT - ( 2019 07:10 )  PTT:29.8 sec  Urinalysis Basic - ( 2019 21:00 )    Color: Light Yellow / Appearance: Clear / S.028 / pH: x  Gluc: x / Ketone: Negative  / Bili: Negative / Urobili: <2 mg/dL   Blood: x / Protein: Negative / Nitrite: Negative   Leuk Esterase: Negative / RBC: 10-25 /HPF / WBC x   Sq Epi: x / Non Sq Epi: few /HPF / Bacteria: Occasional          Neuroimaging:  NCHCT: CT Head No Cont:   EXAM:  CT BRAIN            PROCEDURE DATE:  2019            INTERPRETATION:  Clinical History / Reason for exam: Right MCA infarct   status post thrombectomy. Follow-up.    Technique: Noncontrast head CT.  Contiguous unenhanced CT axial images of   the head from the base to the vertex with coronal and sagittal reformats.    Comparison: CT head and perfusion studies from earlier the same day.    Findings:    There is residual IV contrast within the vessels from earlier CTA and   cerebral angiogram. There is small linear hyperdensity (arterial vs   subarachnoid space) within the right sylvian fissure on series 5 image 42.    The ventricles and cortical sulci demonstrate stable mild parenchymal   atrophy.    There are patchy hypodensities throughout the hemispheric white matter   without mass effect compatible with chronic microvascular changes.      There is no extra-axial fluid collection or midline shift. Redemonstrated   chronic infarct in the right parietal lobe. No evidence of new large   territory infarct.    Dural based enhancing lesion along the right aspect of the   interhemispheric fissure measuring 0.4 x 1.2 cm likely reflecting a   meningioma.    There is calcific atherosclerotic disease at the skull base.    There is mild to moderate scattered paranasal sinus mucosal disease.    IMPRESSION:     1.  Interval cerebral angiogram with residual IV contrast present. Mild   focal prominence of a right sylvian fissure branch vessel, likely   contrast staining in/around the vessel. Recommend a follow-up study.    2.  No evidence of acute large territory infarct.                    BJORN PAIGE M.D., ATTENDING RADIOLOGIST  This document has been electronically signed. 2019 12:28PM             (19 @ 11:04)    CT Angiography/Perfusion:  MRI Brain NC:  MRA Head/Neck:  EEG:

## 2019-07-13 NOTE — PROGRESS NOTE ADULT - SUBJECTIVE AND OBJECTIVE BOX
SUBJECTIVE:    Patient is a 68y old  Male who presents with a chief complaint of Fall + facial droop (2019 08:46)    Currently admitted to medicine with the primary diagnosis of Stroke     Today is hospital day 1d. Patient was seen and examined at bedside. This morning he is resting comfortably in bed and reports no new issues or overnight events.     PAST MEDICAL & SURGICAL HISTORY  PAST MEDICAL & SURGICAL HISTORY:  DVT, lower extremity: 2019, not currently on anticoagulation  Stroke: 10 years ago as per MD HALE (hyperlipidemia)  CAD (coronary artery disease): s/p stenting and CABG 10 years ago  S/P CABG (coronary artery bypass graft)    SOCIAL HISTORY:    ALLERGIES:  No Known Allergies    MEDICATIONS:  STANDING MEDICATIONS  aspirin 325 milliGRAM(s) Oral daily  atorvastatin 80 milliGRAM(s) Oral at bedtime  chlorhexidine 4% Liquid 1 Application(s) Topical <User Schedule>  pantoprazole  Injectable 40 milliGRAM(s) IV Push daily  sodium chloride 0.9% 250 milliLiter(s) IV Continuous <Continuous>  sodium chloride 0.9%. 1000 milliLiter(s) IV Continuous <Continuous>  thiamine Injectable 100 milliGRAM(s) IV Push daily    PRN MEDICATIONS    VITALS:   T(F): 96.8  HR: 60  BP: 135/58  RR: 23  SpO2: 100%    LABS:                        11.9   7.48  )-----------( 163      ( 2019 04:09 )             35.3     07-13    140  |  108  |  16  ----------------------------<  119<H>  4.4   |  24  |  0.9    Ca    7.9<L>      2019 04:09  Mg     2.8     07-13    TPro  6.7  /  Alb  3.7  /  TBili  0.5  /  DBili  x   /  AST  16  /  ALT  19  /  AlkPhos  82  07-12    PT/INR - ( 2019 07:10 )   PT: 11.80 sec;   INR: 1.03 ratio         PTT - ( 2019 07:10 )  PTT:29.8 sec  Urinalysis Basic - ( 2019 21:00 )    Color: Light Yellow / Appearance: Clear / S.028 / pH: x  Gluc: x / Ketone: Negative  / Bili: Negative / Urobili: <2 mg/dL   Blood: x / Protein: Negative / Nitrite: Negative   Leuk Esterase: Negative / RBC: 10-25 /HPF / WBC x   Sq Epi: x / Non Sq Epi: few /HPF / Bacteria: Occasional            CARDIAC MARKERS ( 2019 07:10 )  x     / <0.01 ng/mL / x     / x     / x          RADIOLOGY:    PHYSICAL EXAM:  GENERAL: NAD, speaks in full sentences, no signs of respiratory distress  HEAD: Atraumatic  NECK: Supple  CHEST/LUNG: Clear to auscultation bilaterally; No wheeze or crackles  HEART: S1, S2; RRR; No murmurs, rubs, or gallops  ABDOMEN: BS+; Soft, Non-tender, Non-distended  EXTREMITIES:  2+ Peripheral Pulses, No clubbing, cyanosis, or edema  PSYCH: AAOx3  NEUROLOGY: non-focal  SKIN: No rashes or lesions SUBJECTIVE:    Patient is a 68y old  Male who presents with a chief complaint of Fall + facial droop (2019 08:46)    Currently admitted to medicine with the primary diagnosis of Stroke     Today is hospital day 1d. Patient was seen and examined at bedside. This morning he is resting comfortably in bed and reports no new issues or overnight events. Patient speaks Guamanian as primary language, translated by his daughter. He denies any chest pain, difficulty breathing, vomiting, nausea, dizziness, or blurry vision. He has been tolerating his feeds and expresses his desire to get out of bed to move around. patient is alert and follows commands.     PAST MEDICAL & SURGICAL HISTORY  PAST MEDICAL & SURGICAL HISTORY:  DVT, lower extremity: 2019, not currently on anticoagulation  Stroke: 10 years ago as per MD HALE (hyperlipidemia)  CAD (coronary artery disease): s/p stenting and CABG 10 years ago  S/P CABG (coronary artery bypass graft)    SOCIAL HISTORY:    ALLERGIES:  No Known Allergies    MEDICATIONS:  STANDING MEDICATIONS  aspirin 325 milliGRAM(s) Oral daily  atorvastatin 80 milliGRAM(s) Oral at bedtime  chlorhexidine 4% Liquid 1 Application(s) Topical <User Schedule>  pantoprazole  Injectable 40 milliGRAM(s) IV Push daily  sodium chloride 0.9% 250 milliLiter(s) IV Continuous <Continuous>  sodium chloride 0.9%. 1000 milliLiter(s) IV Continuous <Continuous>  thiamine Injectable 100 milliGRAM(s) IV Push daily    PRN MEDICATIONS    VITALS:   T(F): 96.8  HR: 60  BP: 135/58  RR: 23  SpO2: 100%    LABS:                        11.9   7.48  )-----------( 163      ( 2019 04:09 )             35.3     07-13    140  |  108  |  16  ----------------------------<  119<H>  4.4   |  24  |  0.9    Ca    7.9<L>      2019 04:09  Mg     2.8     07-13    TPro  6.7  /  Alb  3.7  /  TBili  0.5  /  DBili  x   /  AST  16  /  ALT  19  /  AlkPhos  82  07-12    PT/INR - ( 2019 07:10 )   PT: 11.80 sec;   INR: 1.03 ratio         PTT - ( 2019 07:10 )  PTT:29.8 sec  Urinalysis Basic - ( 2019 21:00 )    Color: Light Yellow / Appearance: Clear / S.028 / pH: x  Gluc: x / Ketone: Negative  / Bili: Negative / Urobili: <2 mg/dL   Blood: x / Protein: Negative / Nitrite: Negative   Leuk Esterase: Negative / RBC: 10-25 /HPF / WBC x   Sq Epi: x / Non Sq Epi: few /HPF / Bacteria: Occasional            CARDIAC MARKERS ( 2019 07:10 )  x     / <0.01 ng/mL / x     / x     / x          RADIOLOGY:  < from: CT Brain Stroke Protocol (19 @ 07:35) >  Impression:      No mass effect or intracranial hemorrhage.    Likely chronic small right parietal lobe infarction.    < end of copied text >      < from: CT Head No Cont (19 @ 11:04) >  IMPRESSION:     1.  Interval cerebral angiogram with residual IV contrast present. Mild   focal prominence of a right sylvian fissure branch vessel, likely   contrast staining in/around the vessel. Recommend a follow-up study.    2.  No evidence of acute large territory infarct.    < end of copied text >  	      PHYSICAL EXAM:  GENERAL: NAD, speaks in full sentences, no signs of respiratory distress  HEAD: Atraumatic, normocephalic   NECK: Supple  CHEST/LUNG: Clear to auscultation bilaterally; No wheeze or crackles  HEART: S1, S2; RRR; No murmurs, rubs, or gallops  ABDOMEN: BS+; Soft, Non-tender, Non-distended  EXTREMITIES:  2+ Peripheral Pulses, No clubbing, cyanosis, or edema  PSYCH: AAOx3  NEUROLOGY: moves upper extremities on command with full strength, able to squeeze fingers with full strength, moves lower extremities with full strength, wiggles toes, PERRLA   SKIN: No rashes or lesions

## 2019-07-13 NOTE — PROGRESS NOTE ADULT - SUBJECTIVE AND OBJECTIVE BOX
CTU Attending Progress Daily Note     2019 08:46  POD# -   He has history of DVT, lower extremity  Stroke  HLD (hyperlipidemia)  CAD (coronary artery disease)    Interval event for past 24 hr:  ARELI PITTMAN  68y had no event.   Current Complains:  ARELI PITTMAN has no new complains  HPI:  Mr. Pittman is a 69 yo male patient, with a PMH of CAD s/p stenting and CABG 10 years ago, history of CVA more than 10 years ago with minimal residual left upper extremity weakness, lower extremities DVT in 2019 when he was in [facility] treated for a short period of time with s/c injections and oral anticoagulation, on aspirin, functional at baseline, was brought in by his family due to unwitnessed fall this AM in the bathroom. LKW is 10:30 PM yesterday. Initially pt presented with right gaze and left sided weakness which improved upon presentation.    In ED, code stroke was called, he was evaluated by the neurology service, NIHSS of 4. No TPA as LKW >4.5 hours. CTP (+) distal M1 occlusion w large penumbra, code NI activated. Post CTA/CTP his NIHSS was 7. Delay in neurointervention inintially as family was not agreeing to intervention. Patient then transferred to  IR suite for mechanical thrombectomy. (2019 13:00)    OBJECTIVE:  ICU Vital Signs Last 24 Hrs  T(C): 36.5 (2019 04:00), Max: 36.5 (2019 04:00)  T(F): 97.7 (2019 04:00), Max: 97.7 (2019 04:00)  HR: 56 (2019 08:00) (38 - 64)  BP: 119/60 (2019 08:00) (90/53 - 127/66)  BP(mean): 87 (2019 08:00) (59 - 100)  ABP: --  ABP(mean): --  RR: 16 (2019 08:00) (7 - 28)  SpO2: 100% (2019 08:00) (98% - 100%)    I&O's Summary    2019 07:01  -  2019 07:00  --------------------------------------------------------  IN: 3050 mL / OUT: 3275 mL / NET: -225 mL    2019 07:  -  2019 08:46  --------------------------------------------------------  IN: 175 mL / OUT: 300 mL / NET: -125 mL      I&O's Detail    2019 07:  -  2019 07:00  --------------------------------------------------------  IN:    sodium chloride 0.9%: 200 mL    Sodium Chloride 0.9% IV Bolus: 1500 mL    sodium chloride 0.9%.: 1350 mL  Total IN: 3050 mL    OUT:    Voided: 3275 mL  Total OUT: 3275 mL    Total NET: -225 mL      2019 07:  -  2019 08:46  --------------------------------------------------------  IN:    sodium chloride 0.9%: 25 mL    sodium chloride 0.9%.: 150 mL  Total IN: 175 mL    OUT:    Indwelling Catheter - Urethral: 300 mL  Total OUT: 300 mL    Total NET: -125 mL        Adult Advanced Hemodynamics Last 24 Hrs  CVP(mm Hg): --  CVP(cm H2O): --  CO: --  CI: --  PA: --  PA(mean): --  PCWP: --  SVR: --  SVRI: --  PVR: --  PVRI: --    CAPILLARY BLOOD GLUCOSE      POCT Blood Glucose.: 108 mg/dL (2019 05:56)  POCT Blood Glucose.: 109 mg/dL (2019 23:26)  POCT Blood Glucose.: 110 mg/dL (2019 18:43)    LABS:                          11.9   7.48  )-----------( 163      ( 2019 04:09 )             35.3     07-13    140  |  108  |  16  ----------------------------<  119<H>  4.4   |  24  |  0.9    Ca    7.9<L>      2019 04:09  Mg     2.8     13    TPro  6.7  /  Alb  3.7  /  TBili  0.5  /  DBili  x   /  AST  16  /  ALT  19  /  AlkPhos  82  07-12    PT/INR - ( 2019 07:10 )   PT: 11.80 sec;   INR: 1.03 ratio         PTT - ( 2019 07:10 )  PTT:29.8 sec  Urinalysis Basic - ( 2019 21:00 )    Color: Light Yellow / Appearance: Clear / S.028 / pH: x  Gluc: x / Ketone: Negative  / Bili: Negative / Urobili: <2 mg/dL   Blood: x / Protein: Negative / Nitrite: Negative   Leuk Esterase: Negative / RBC: 10-25 /HPF / WBC x   Sq Epi: x / Non Sq Epi: few /HPF / Bacteria: Occasional        Home Medications:  ASPIRIN      TAB 81MG EC: 1  orally once a day (2019 13:00)  ATORVASTATIN TAB 20M  orally once a day (2019 13:00)  BISOPROL FUM TAB 5M  orally once a day (2019 13:00)  ENALAPRIL    TAB 10M  orally once a day (2019 13:00)  SPIRONOLACT  TAB 25M  orally once a day (2019 13:00)    HOSPITAL MEDICATIONS:  MEDICATIONS  (STANDING):  aspirin 325 milliGRAM(s) Oral daily  atorvastatin 80 milliGRAM(s) Oral at bedtime  chlorhexidine 4% Liquid 1 Application(s) Topical <User Schedule>  pantoprazole  Injectable 40 milliGRAM(s) IV Push daily  sodium chloride 0.9% 250 milliLiter(s) (12.5 mL/Hr) IV Continuous <Continuous>  sodium chloride 0.9%. 1000 milliLiter(s) (75 mL/Hr) IV Continuous <Continuous>  thiamine Injectable 100 milliGRAM(s) IV Push daily    MEDICATIONS  (PRN):      REVIEW OF SYSTEMS:  CONSTITUTIONAL: [X] all negative; [ ] weakness, [ ] fevers, [ ] chills  EYES/ENT: [X] all negative; [ ] visual changes, [ ] vertigo, [ ] throat pain   NECK: [X] all negative; [ ] pain, [ ] stiffness  RESPIRATORY: [] all negative, [ ] cough, [ ] wheezing, [ ] hemoptysis, [ ] shortness of breath  CARDIOVASCULAR: [] all negative; [ ] chest pain, [ ] palpitations, [ ] orthopnea  GASTROINTESTINAL: [X] all negative; [ ]abdominal pain, [ ] nausea, [ ] vomiting, [ ] hematemesis, [ ] diarrhea, [ ] constipation, [ ] melena, [ ] hematochezia.  GENITOURINARY: [X] all negative; [ ] dysuria, [ ] frequency, [ ] hematuria  NEUROLOGICAL: [X] all negative; [ ] numbness, [ ] weakness  SKIN: [X] all negative; [ ] itching, [ ] burning, [ ] rashes, [ ] lesions   All other review of systems is negative unless indicated above.    [  ] Unable to assess ROS because     PHYSICAL EXAM:          CONSTITUTIONAL: Well-developed; well-nourished; in no acute distress.   	SKIN: warm, dry  	HEAD: Normocephalic; atraumatic.  	EYES: PERRL, EOM, no conj injection, sclera clear  	ENT: No nasal discharge; airway clear.  	NECK: Supple; non tender.  No midline ttp ctls  	CARD: S1, S2 normal; no murmurs, gallops, or rubs. Regular rate and rhythm. 2+ RPs and DPs bilat, no carotid bruits, no pedal   edema, no calf pain b/l  	RESP: CTA  bilat good air movement No wheezes, rales or rhonchi.  	ABD: Soft, not tender, not distended, no CVA ttp no rebound or guarding, bowel sounds present  	EXT: Normal ROM.  No clubbing, cyanosis or edema.   	  	NEURO: Alert, awake, motor 5/5 R, 5/5 L        RADIOLOGY:  xray ICU  Attending Progress Daily Note     2019 08:46   CVA s/p t thrombectomy   He has history of DVT, lower extremity  Stroke  HLD (hyperlipidemia)  CAD (coronary artery disease)    Interval event for past 24 hr:  ARELI PITTMAN  68y had no event.   Current Complains:  ARELI PITTMAN has no new complains  HPI:  Mr. Pittman is a 67 yo male patient, with a PMH of CAD s/p stenting and CABG 10 years ago, history of CVA more than 10 years ago with minimal residual left upper extremity weakness, lower extremities DVT in 2019 when he was in [facility] treated for a short period of time with s/c injections and oral anticoagulation, on aspirin, functional at baseline, was brought in by his family due to unwitnessed fall this AM in the bathroom. LKW is 10:30 PM yesterday. Initially pt presented with right gaze and left sided weakness which improved upon presentation.    In ED, code stroke was called, he was evaluated by the neurology service, NIHSS of 4. No TPA as LKW >4.5 hours. CTP (+) distal M1 occlusion w large penumbra, code NI activated. Post CTA/CTP his NIHSS was 7. Delay in neurointervention inintially as family was not agreeing to intervention. Patient then transferred to  IR suite for mechanical thrombectomy. (2019 13:00)    OBJECTIVE:  ICU Vital Signs Last 24 Hrs  T(C): 36.5 (2019 04:00), Max: 36.5 (2019 04:00)  T(F): 97.7 (2019 04:00), Max: 97.7 (2019 04:00)  HR: 56 (2019 08:00) (38 - 64)  BP: 119/60 (2019 08:00) (90/53 - 127/66)  BP(mean): 87 (2019 08:00) (59 - 100)  ABP: --  ABP(mean): --  RR: 16 (2019 08:00) (7 - 28)  SpO2: 100% (2019 08:00) (98% - 100%)    I&O's Summary    2019 07:01  -  2019 07:00  --------------------------------------------------------  IN: 3050 mL / OUT: 3275 mL / NET: -225 mL    2019 07:  -  2019 08:46  --------------------------------------------------------  IN: 175 mL / OUT: 300 mL / NET: -125 mL      I&O's Detail    2019 07:  -  2019 07:00  --------------------------------------------------------  IN:    sodium chloride 0.9%: 200 mL    Sodium Chloride 0.9% IV Bolus: 1500 mL    sodium chloride 0.9%.: 1350 mL  Total IN: 3050 mL    OUT:    Voided: 3275 mL  Total OUT: 3275 mL    Total NET: -225 mL      2019 07:  -  2019 08:46  --------------------------------------------------------  IN:    sodium chloride 0.9%: 25 mL    sodium chloride 0.9%.: 150 mL  Total IN: 175 mL    OUT:    Indwelling Catheter - Urethral: 300 mL  Total OUT: 300 mL    Total NET: -125 mL            CAPILLARY BLOOD GLUCOSE      POCT Blood Glucose.: 108 mg/dL (2019 05:56)  POCT Blood Glucose.: 109 mg/dL (2019 23:26)  POCT Blood Glucose.: 110 mg/dL (2019 18:43)    LABS:                          11.9   7.48  )-----------( 163      ( 2019 04:09 )             35.3     07-13    140  |  108  |  16  ----------------------------<  119<H>  4.4   |  24  |  0.9    Ca    7.9<L>      2019 04:09  Mg     2.8     07-13    TPro  6.7  /  Alb  3.7  /  TBili  0.5  /  DBili  x   /  AST  16  /  ALT  19  /  AlkPhos  82  07-12    PT/INR - ( 2019 07:10 )   PT: 11.80 sec;   INR: 1.03 ratio         PTT - ( 2019 07:10 )  PTT:29.8 sec  Urinalysis Basic - ( 2019 21:00 )    Color: Light Yellow / Appearance: Clear / S.028 / pH: x  Gluc: x / Ketone: Negative  / Bili: Negative / Urobili: <2 mg/dL   Blood: x / Protein: Negative / Nitrite: Negative   Leuk Esterase: Negative / RBC: 10-25 /HPF / WBC x   Sq Epi: x / Non Sq Epi: few /HPF / Bacteria: Occasional        Home Medications:  ASPIRIN      TAB 81MG EC: 1  orally once a day (2019 13:00)  ATORVASTATIN TAB 20M  orally once a day (2019 13:00)  BISOPROL FUM TAB 5M  orally once a day (2019 13:00)  ENALAPRIL    TAB 10M  orally once a day (2019 13:00)  SPIRONOLACT  TAB 25M  orally once a day (2019 13:00)    HOSPITAL MEDICATIONS:  MEDICATIONS  (STANDING):  aspirin 325 milliGRAM(s) Oral daily  atorvastatin 80 milliGRAM(s) Oral at bedtime  chlorhexidine 4% Liquid 1 Application(s) Topical <User Schedule>  pantoprazole  Injectable 40 milliGRAM(s) IV Push daily  sodium chloride 0.9% 250 milliLiter(s) (12.5 mL/Hr) IV Continuous <Continuous>  sodium chloride 0.9%. 1000 milliLiter(s) (75 mL/Hr) IV Continuous <Continuous>  thiamine Injectable 100 milliGRAM(s) IV Push daily    MEDICATIONS  (PRN):      REVIEW OF SYSTEMS:  CONSTITUTIONAL: [X] all negative; [ ] weakness, [ ] fevers, [ ] chills  EYES/ENT: [X] all negative; [ ] visual changes, [ ] vertigo, [ ] throat pain   NECK: [X] all negative; [ ] pain, [ ] stiffness  RESPIRATORY: [] all negative, [ ] cough, [ ] wheezing, [ ] hemoptysis, [ ] shortness of breath  CARDIOVASCULAR: [] all negative; [ ] chest pain, [ ] palpitations, [ ] orthopnea  GASTROINTESTINAL: [X] all negative; [ ]abdominal pain, [ ] nausea, [ ] vomiting, [ ] hematemesis, [ ] diarrhea, [ ] constipation, [ ] melena, [ ] hematochezia.  GENITOURINARY: [X] all negative; [ ] dysuria, [ ] frequency, [ ] hematuria  NEUROLOGICAL: [X] all negative; [ ] numbness, [ ] weakness  SKIN: [X] all negative; [ ] itching, [ ] burning, [ ] rashes, [ ] lesions   All other review of systems is negative unless indicated above.    [  ] Unable to assess ROS because     PHYSICAL EXAM:          CONSTITUTIONAL: Well-developed; well-nourished; in no acute distress.   	SKIN: warm, dry  	HEAD: Normocephalic; atraumatic.  	EYES: PERRL, EOM, no conj injection, sclera clear  	ENT: No nasal discharge; airway clear.  	NECK: Supple; non tender.  No midline ttp ctls  	CARD: S1, S2 normal; no murmurs, gallops, or rubs. Regular rate and rhythm. 2+ RPs and DPs bilat, no carotid bruits, no pedal   edema, no calf pain b/l  	RESP: CTA  bilat good air movement No wheezes, rales or rhonchi.  	ABD: Soft, not tender, not distended, no CVA ttp no rebound or guarding, bowel sounds present  	EXT: Normal ROM.  No clubbing, cyanosis or edema.   	  	NEURO: Alert, awake, motor 5/5 R, 5/5 L        RADIOLOGY:  < from: CT Head No Cont (19 @ 11:04) >    IMPRESSION:     1.  Interval cerebral angiogram with residual IV contrast present. Mild   focal prominence of a right sylvian fissure branch vessel, likely   contrast staining in/around the vessel. Recommend a follow-up study.    2.  No evidence of acute large territory infarct.    < end of copied text >  xray

## 2019-07-13 NOTE — PROGRESS NOTE ADULT - ASSESSMENT
Assessment and Recommendation:   · Assessment		  IMPRESSION:  Ischemic right MCA CVA s/p thrombectomy   HO CVA   HO CAD s/p CABG    PLAN:    CNS: Neuro checks q 1; Repeat CT-H in 24 hrs or earlier if worsening status; ASA, Lipitor per neuro; Thiamine, folate; CIWA protocol;     HEENT: Oral care     PULMONARY: HOB at 30 degrees; Cxr    CARDIOVASCULAR: Keep I=O; NS at 75 cc/hr; TTE     GI: GI prophylaxis.  Feeding once pass S/S    RENAL: FU lytes; Check Mg, Keep 2-3;     INFECTIOUS DISEASE: Panculture     HEMATOLOGICAL:  DVT prophylaxis--> SCD;     ENDOCRINE:  Follow up FS.  Insulin protocol if needed.    MUSCULOSKELETAL: Bedrest for now     MICU monitoring

## 2019-07-13 NOTE — CHART NOTE - NSCHARTNOTEFT_GEN_A_CORE
Assessment:  This is a 68y Male with h/o HTN, DM s/p Right MCA M1 LVO s/p Right MCA thrombectomy with 4 passes with combination of aspiration and stent retriever with TICI 3 realized.    Impression  # Ischemic right MCA CVA s/p thrombectomy  #Hx Alcohol use   # Hx of CVA  # Hx of CAD s/p CABG       #Ischemic Right MCA CVA s/p Thrombectomy   - F/u repeat CT on 7/13  --140  -Neuro Checks q1 hr   -Stat head CT for mental status changes   -Contonue Aspirin and Statin   - Normal Saline at 75cc  -Magnesium 2-3       #History of Alcohol Use  -CIWA protocol   -no Ativan given yet    #Hx of CVA   -Aspirin and Statin    Hx of CAD s/p CABG  - Aspirin and Statin  - F/u Cardio     F/U cultures    GI  -ppx  - Tolerating full diet   -F/U speech and swallow       DVT ppx  -Sequentials   -OOBTC once CT is ok         Dispo: home History of Present Illness:  Reason for Admission: Fall + facial droop  History of Present Illness:   Mr. Lomeli is a 67 yo male patient, with a PMH of CAD s/p stenting and CABG 10 years ago, history of CVA more than 10 years ago with minimal residual left upper extremity weakness, lower extremities DVT in January 2019 when he was in [facility] treated for a short period of time with s/c injections and oral anticoagulation, on aspirin, functional at baseline, was brought in by his family due to unwitnessed fall this AM in the bathroom. LKW is 10:30 PM yesterday. Initially pt presented with right gaze and left sided weakness which improved upon presentation.    In ED, code stroke was called, he was evaluated by the neurology service, NIHSS of 4. No TPA as LKW >4.5 hours. CTP (+) distal M1 occlusion w large penumbra, code NI activated. Post CTA/CTP his NIHSS was 7. Delay in neurointervention inintially as family was not agreeing to intervention. Patient then transferred to  IR suite for mechanical thrombectomy.      Assessment:  This is a 68y Male with h/o HTN, DM s/p Right MCA M1 LVO s/p Right MCA thrombectomy with 4 passes with combination of aspiration and stent retriever with TICI 3 realized.    Impression  # Ischemic right MCA CVA s/p thrombectomy  #Hx Alcohol use   # Hx of CVA  # Hx of CAD s/p CABG       #Ischemic Right MCA CVA s/p Thrombectomy   - F/u repeat CT on 7/13  --140  -Neuro Checks q1 hr   -Stat head CT for mental status changes   -Contonue Aspirin and Statin   - Normal Saline at 75cc  -Magnesium 2-3       #History of Alcohol Use  -CIWA protocol   -no Ativan given yet    #Hx of CVA   -Aspirin and Statin    Hx of CAD s/p CABG  - Aspirin and Statin  - F/u Cardio     F/U cultures    GI  -ppx  - Tolerating full diet   -F/U speech and swallow       DVT ppx  -Sequentials   -OOBTC once CT is ok         Dispo: home History of Present Illness:  Reason for Admission: Fall + facial droop  History of Present Illness:   Mr. Lomeli is a 67 yo male patient, with a PMH of CAD s/p stenting and CABG 10 years ago, history of CVA more than 10 years ago with minimal residual left upper extremity weakness, lower extremities DVT in January 2019 when he was in [facility] treated for a short period of time with s/c injections and oral anticoagulation, on aspirin, functional at baseline, was brought in by his family due to unwitnessed fall this AM in the bathroom. LKW is 10:30 PM yesterday. Initially pt presented with right gaze and left sided weakness which improved upon presentation.    In ED, code stroke was called, he was evaluated by the neurology service, NIHSS of 4. No TPA as LKW >4.5 hours. CTP (+) distal M1 occlusion w large penumbra, code NI activated. Post CTA/CTP his NIHSS was 7. Delay in neurointervention inintially as family was not agreeing to intervention. Patient then transferred to  IR suite for mechanical thrombectomy.        ICU  Patient is s/p thrombectomy for a distal M1 occlusion with large penumbra.               Assessment:  This is a 68y Male with h/o HTN, DM s/p Right MCA M1 LVO s/p Right MCA thrombectomy with 4 passes with combination of aspiration and stent retriever with TICI 3 realized.    Impression  # Ischemic right MCA CVA s/p thrombectomy  #Hx Alcohol use   # Hx of CVA  # Hx of CAD s/p CABG       #Ischemic Right MCA CVA s/p Thrombectomy   - F/u repeat CT on 7/13  --140  -Neuro Checks q4 hour   -Stat head CT for mental status changes   -Contonue Aspirin and Statin   - Normal Saline at 75cc  -Magnesium 2-3       #History of Alcohol Use  -CIWA protocol   -no Ativan given yet    #Hx of CVA   -Aspirin and Statin    Hx of CAD s/p CABG  - Aspirin and Statin  - F/u Cardio     F/U cultures    GI  -ppx  - Tolerating full diet   -F/U speech and swallow       DVT ppx  -Sequentials   -OOBTC once CT is ok         Dispo: home History of Present Illness:  Reason for Admission: Fall + facial droop  History of Present Illness:   Mr. Lomeli is a 69 yo male patient, with a PMH of CAD s/p stenting and CABG 10 years ago, history of CVA more than 10 years ago with minimal residual left upper extremity weakness, lower extremities DVT in January 2019 when he was in [facility] treated for a short period of time with s/c injections and oral anticoagulation, on aspirin, functional at baseline, was brought in by his family due to unwitnessed fall this AM in the bathroom. LKW is 10:30 PM yesterday. Initially pt presented with right gaze and left sided weakness which improved upon presentation.    In ED, code stroke was called, he was evaluated by the neurology service, NIHSS of 4. No TPA as LKW >4.5 hours. CTP (+) distal M1 occlusion w large penumbra, code NI activated. Post CTA/CTP his NIHSS was 7. Delay in neurointerventional initially as family was not agreeing to intervention. Patient then transferred to  IR suite for mechanical thrombectomy.        ICU  Patient presented to the ICU s/p thrombectomy for a Right distal M1 occlusion with large penumbra on July 12th at 13:00. While in the unit aspirin and Statin were started. Patient received neuro checks q1 hour. A repeat CT head was performed 7/13 afternoon, still pending results. Depending on these results the patient will be cleared for transfer to the stroke unit.     F/U  - 7/13 afternoon CT Head   - Neuro checks   -Magnesium 2-3   -Repeat CT for MS changes  -[facility] protocol   -SBP- 100-140           Assessment:  This is a 68y Male with h/o HTN, DM s/p Right MCA M1 LVO s/p Right MCA thrombectomy with 4 passes with combination of aspiration and stent retriever with TICI 3 realized.    Impression  # Ischemic right MCA CVA s/p thrombectomy  #Hx Alcohol use   # Hx of CVA  # Hx of CAD s/p CABG       #Ischemic Right MCA CVA s/p Thrombectomy   - F/u repeat CT on 7/13  --140  -Neuro Checks q4 hour   -Stat head CT for mental status changes   -Contonue Aspirin and Statin   - Normal Saline at 75cc  -Magnesium 2-3       #History of Alcohol Use  -[facility] protocol   -no Ativan given yet    #Hx of CVA   -Aspirin and Statin    Hx of CAD s/p CABG  - Aspirin and Statin  - F/u Cardio     F/U cultures    GI  -ppx  - Tolerating full diet   -F/U speech and swallow       DVT ppx  -Sequentials   -OOBTC once CT is ok         Dispo: home

## 2019-07-13 NOTE — CONSULT NOTE ADULT - ASSESSMENT
Assessment:  This is a 68y Male with h/o HTN, DM s/p Right MCA M1 LVO s/p Right MCA thrombectomy with 4 passes with combination of aspiration and stent retriever with TICI 3 realized.            Plan:  Keep SBp below 100-140 use PRN IVF vs Albumins and lay flat at 0 degrees          Neuro checks q 1H         Stat HCT for MS changes         Continue ASA daily with statin               -   07-13-19 @ 03:33

## 2019-07-13 NOTE — PROGRESS NOTE ADULT - ASSESSMENT
Impression  # Ischemic right MCA CVA s/p thrombectomy  #Hx Alcohol use   # Hx of CVA  # Hx of CAD s/p CABG                   Assessment:  This is a 68y Male with h/o HTN, DM s/p Right MCA M1 LVO s/p Right MCA thrombectomy with 4 passes with combination of aspiration and stent retriever with TICI 3 realized.            Plan:  Keep SBp below 100-140 use PRN IVF vs Albumins and lay flat at 0 degrees          Neuro checks q 1H         Stat HCT for MS changes         Continue ASA daily with statin      IMPRESSION:  Ischemic right MCA CVA s/p thrombectomy   HO CVA   HO CAD s/p CABG    PLAN:    CNS: Neuro checks q 1; Repeat CT-H in 24 hrs or earlier if worsening status; ASA, Lipitor per neuro; Thiamine, folate; CIWA protocol;     HEENT: Oral care     PULMONARY: HOB at 30 degrees; Cxr    CARDIOVASCULAR: Keep I=O; NS at 75 cc/hr; TTE     GI: GI prophylaxis.  Feeding once pass S/S    RENAL: FU lytes; Check Mg, Keep 2-3;     INFECTIOUS DISEASE: Panculture     HEMATOLOGICAL:  DVT prophylaxis--> SCD;     ENDOCRINE:  Follow up FS.  Insulin protocol if needed.    MUSCULOSKELETAL: Bedrest for now     MICU monitoring Assessment:  This is a 68y Male with h/o HTN, DM s/p Right MCA M1 LVO s/p Right MCA thrombectomy with 4 passes with combination of aspiration and stent retriever with TICI 3 realized.    Impression  # Ischemic right MCA CVA s/p thrombectomy  #Hx Alcohol use   # Hx of CVA  # Hx of CAD s/p CABG       #Ischemic Right MCA CVA s/p Thrombectomy   - F/u repeat CT on 7/13  --140  -Neuro Checks q1 hr   -Stat head CT for mental status changes   -Contonue Aspirin and Statin   - Normal Saline at 75cc  -Magnesium 2-3       #History of Alcohol Use  -CIWA protocol   -no Ativan given yet    #Hx of CVA   -Aspirin and Statin    Hx of CAD s/p CABG  - Aspirin and Statin  - F/u Cardio     F/U cultures    GI  -ppx  - Tolerating full diet   -F/U speech and swallow       DVT ppx  -Sequentials   -OOBTC once CT is ok         Dispo: home

## 2019-07-14 LAB
ALBUMIN SERPL ELPH-MCNC: 3 G/DL — LOW (ref 3.5–5.2)
ALP SERPL-CCNC: 71 U/L — SIGNIFICANT CHANGE UP (ref 30–115)
ALT FLD-CCNC: 12 U/L — SIGNIFICANT CHANGE UP (ref 0–41)
ANION GAP SERPL CALC-SCNC: 9 MMOL/L — SIGNIFICANT CHANGE UP (ref 7–14)
AST SERPL-CCNC: 15 U/L — SIGNIFICANT CHANGE UP (ref 0–41)
BASOPHILS # BLD AUTO: 0.02 K/UL — SIGNIFICANT CHANGE UP (ref 0–0.2)
BASOPHILS NFR BLD AUTO: 0.2 % — SIGNIFICANT CHANGE UP (ref 0–1)
BILIRUB SERPL-MCNC: 0.8 MG/DL — SIGNIFICANT CHANGE UP (ref 0.2–1.2)
BUN SERPL-MCNC: 15 MG/DL — SIGNIFICANT CHANGE UP (ref 10–20)
CALCIUM SERPL-MCNC: 8.1 MG/DL — LOW (ref 8.5–10.1)
CHLORIDE SERPL-SCNC: 107 MMOL/L — SIGNIFICANT CHANGE UP (ref 98–110)
CO2 SERPL-SCNC: 24 MMOL/L — SIGNIFICANT CHANGE UP (ref 17–32)
CREAT SERPL-MCNC: 0.8 MG/DL — SIGNIFICANT CHANGE UP (ref 0.7–1.5)
EOSINOPHIL # BLD AUTO: 0.32 K/UL — SIGNIFICANT CHANGE UP (ref 0–0.7)
EOSINOPHIL NFR BLD AUTO: 4 % — SIGNIFICANT CHANGE UP (ref 0–8)
GLUCOSE BLDC GLUCOMTR-MCNC: 105 MG/DL — HIGH (ref 70–99)
GLUCOSE BLDC GLUCOMTR-MCNC: 105 MG/DL — HIGH (ref 70–99)
GLUCOSE BLDC GLUCOMTR-MCNC: 106 MG/DL — HIGH (ref 70–99)
GLUCOSE BLDC GLUCOMTR-MCNC: 111 MG/DL — HIGH (ref 70–99)
GLUCOSE BLDC GLUCOMTR-MCNC: 87 MG/DL — SIGNIFICANT CHANGE UP (ref 70–99)
GLUCOSE SERPL-MCNC: 120 MG/DL — HIGH (ref 70–99)
HCT VFR BLD CALC: 35.2 % — LOW (ref 42–52)
HCV AB S/CO SERPL IA: 0.14 S/CO — SIGNIFICANT CHANGE UP (ref 0–0.99)
HCV AB SERPL-IMP: SIGNIFICANT CHANGE UP
HGB BLD-MCNC: 11.9 G/DL — LOW (ref 14–18)
IMM GRANULOCYTES NFR BLD AUTO: 0.4 % — HIGH (ref 0.1–0.3)
LYMPHOCYTES # BLD AUTO: 1.81 K/UL — SIGNIFICANT CHANGE UP (ref 1.2–3.4)
LYMPHOCYTES # BLD AUTO: 22.5 % — SIGNIFICANT CHANGE UP (ref 20.5–51.1)
MAGNESIUM SERPL-MCNC: 3 MG/DL — HIGH (ref 1.8–2.4)
MCHC RBC-ENTMCNC: 30.8 PG — SIGNIFICANT CHANGE UP (ref 27–31)
MCHC RBC-ENTMCNC: 33.8 G/DL — SIGNIFICANT CHANGE UP (ref 32–37)
MCV RBC AUTO: 91.2 FL — SIGNIFICANT CHANGE UP (ref 80–94)
MONOCYTES # BLD AUTO: 0.77 K/UL — HIGH (ref 0.1–0.6)
MONOCYTES NFR BLD AUTO: 9.6 % — HIGH (ref 1.7–9.3)
NEUTROPHILS # BLD AUTO: 5.09 K/UL — SIGNIFICANT CHANGE UP (ref 1.4–6.5)
NEUTROPHILS NFR BLD AUTO: 63.3 % — SIGNIFICANT CHANGE UP (ref 42.2–75.2)
NRBC # BLD: 0 /100 WBCS — SIGNIFICANT CHANGE UP (ref 0–0)
PLATELET # BLD AUTO: 156 K/UL — SIGNIFICANT CHANGE UP (ref 130–400)
POTASSIUM SERPL-MCNC: 4.2 MMOL/L — SIGNIFICANT CHANGE UP (ref 3.5–5)
POTASSIUM SERPL-SCNC: 4.2 MMOL/L — SIGNIFICANT CHANGE UP (ref 3.5–5)
PROT SERPL-MCNC: 5.6 G/DL — LOW (ref 6–8)
RBC # BLD: 3.86 M/UL — LOW (ref 4.7–6.1)
RBC # FLD: 12.5 % — SIGNIFICANT CHANGE UP (ref 11.5–14.5)
SODIUM SERPL-SCNC: 140 MMOL/L — SIGNIFICANT CHANGE UP (ref 135–146)
TSH SERPL-MCNC: 0.58 UIU/ML — SIGNIFICANT CHANGE UP (ref 0.27–4.2)
WBC # BLD: 8.04 K/UL — SIGNIFICANT CHANGE UP (ref 4.8–10.8)
WBC # FLD AUTO: 8.04 K/UL — SIGNIFICANT CHANGE UP (ref 4.8–10.8)

## 2019-07-14 PROCEDURE — 99232 SBSQ HOSP IP/OBS MODERATE 35: CPT

## 2019-07-14 PROCEDURE — 99233 SBSQ HOSP IP/OBS HIGH 50: CPT

## 2019-07-14 RX ORDER — HEPARIN SODIUM 5000 [USP'U]/ML
5000 INJECTION INTRAVENOUS; SUBCUTANEOUS EVERY 12 HOURS
Refills: 0 | Status: DISCONTINUED | OUTPATIENT
Start: 2019-07-14 | End: 2019-07-16

## 2019-07-14 RX ORDER — ACETAMINOPHEN 500 MG
650 TABLET ORAL ONCE
Refills: 0 | Status: COMPLETED | OUTPATIENT
Start: 2019-07-14 | End: 2019-07-14

## 2019-07-14 RX ADMIN — Medication 650 MILLIGRAM(S): at 22:00

## 2019-07-14 RX ADMIN — HEPARIN SODIUM 5000 UNIT(S): 5000 INJECTION INTRAVENOUS; SUBCUTANEOUS at 18:00

## 2019-07-14 RX ADMIN — PANTOPRAZOLE SODIUM 40 MILLIGRAM(S): 20 TABLET, DELAYED RELEASE ORAL at 12:10

## 2019-07-14 RX ADMIN — Medication 100 MILLIGRAM(S): at 12:10

## 2019-07-14 RX ADMIN — SODIUM CHLORIDE 12.5 MILLILITER(S): 9 INJECTION, SOLUTION INTRAVENOUS at 05:45

## 2019-07-14 RX ADMIN — SODIUM CHLORIDE 75 MILLILITER(S): 9 INJECTION INTRAMUSCULAR; INTRAVENOUS; SUBCUTANEOUS at 05:43

## 2019-07-14 RX ADMIN — Medication 325 MILLIGRAM(S): at 13:20

## 2019-07-14 RX ADMIN — ATORVASTATIN CALCIUM 80 MILLIGRAM(S): 80 TABLET, FILM COATED ORAL at 21:03

## 2019-07-14 RX ADMIN — CHLORHEXIDINE GLUCONATE 1 APPLICATION(S): 213 SOLUTION TOPICAL at 05:44

## 2019-07-14 RX ADMIN — Medication 650 MILLIGRAM(S): at 21:02

## 2019-07-14 NOTE — PROGRESS NOTE ADULT - SUBJECTIVE AND OBJECTIVE BOX
Neurology Follow up note  Patient seen and examined at bedside, he denies any new complaints . No acute events overnight. Mild left facial droop and mild dysarthria persistent.    Vital Signs Last 24 Hrs  T(C): 36.8 (14 Jul 2019 04:00), Max: 37.1 (14 Jul 2019 00:00)  T(F): 98.3 (14 Jul 2019 04:00), Max: 98.8 (14 Jul 2019 00:00)  HR: 50 (14 Jul 2019 04:00) (44 - 66)  BP: 128/59 (14 Jul 2019 04:00) (94/45 - 135/58)  BP(mean): 77 (14 Jul 2019 04:00) (54 - 105)  RR: 22 (14 Jul 2019 04:00) (9 - 27)  SpO2: 98% (14 Jul 2019 04:00) (98% - 100%)          Neurological Exam:   Mental status: Awake, alert and oriented x3.  speech mildly dysarthric  Cranial nerves:  Intact except for mild left facial droop and mild dysarthria  Motor:  5/5 throughout/ no drift  Sensation: Intact   Coordination: No dysmetria on finger-to-nose and heel-to-shin.  No clumsiness.  Gait: deferred  No neglect    NIHSS 2  loc  0  commands   0    questions   0  vf 0  gaze 0  face mild left facial 1  motor 0  ataxia 0  speech 1  language 0  extinction 0    mRs baseline 0  mRs current  2    Medications  aspirin 325 milliGRAM(s) Oral daily  atorvastatin 80 milliGRAM(s) Oral at bedtime  chlorhexidine 4% Liquid 1 Application(s) Topical <User Schedule>  pantoprazole  Injectable 40 milliGRAM(s) IV Push daily  sodium chloride 0.9% 250 milliLiter(s) IV Continuous <Continuous>  sodium chloride 0.9%. 1000 milliLiter(s) IV Continuous <Continuous>  thiamine Injectable 100 milliGRAM(s) IV Push daily      Lab  Comprehensive Metabolic Panel in AM (07.14.19 @ 04:06)    Sodium, Serum: 140 mmol/L    Potassium, Serum: 4.2 mmol/L    Chloride, Serum: 107 mmol/L    Carbon Dioxide, Serum: 24 mmol/L    Anion Gap, Serum: 9 mmol/L    Blood Urea Nitrogen, Serum: 15 mg/dL    Creatinine, Serum: 0.8 mg/dL    Glucose, Serum: 120 mg/dL    Calcium, Total Serum: 8.1 mg/dL    Protein Total, Serum: 5.6 g/dL    Albumin, Serum: 3.0 g/dL    Bilirubin Total, Serum: 0.8 mg/dL    Alkaline Phosphatase, Serum: 71 U/L    Aspartate Aminotransferase (AST/SGOT): 15 U/L    Alanine Aminotransferase (ALT/SGPT): 12 U/L    eGFR if Non : 91: Interpretative comment    Complete Blood Count + Automated Diff in AM (07.14.19 @ 04:06)    WBC Count: 8.04 K/uL    RBC Count: 3.86 M/uL    Hemoglobin: 11.9 g/dL    Hematocrit: 35.2 %    Mean Cell Volume: 91.2 fL    Mean Cell Hemoglobin: 30.8 pg    Mean Cell Hemoglobin Conc: 33.8 g/dL    Red Cell Distrib Width: 12.5 %    Platelet Count - Automated: 156 K/uL    Auto Neutrophil #: 5.09 K/uL    Auto Lymphocyte #: 1.81 K/uL    Auto Monocyte #: 0.77 K/uL    Auto Eosinophil #: 0.32 K/uL    Auto Basophil #: 0.02 K/uL    Auto Neutrophil %: 63.3: Differential percentages must be correlated with absolute numbers for  clinical significance. %    Auto Lymphocyte %: 22.5 %    Auto Monocyte %: 9.6 %    Auto Eosinophil %: 4.0 %    Auto Basophil %: 0.2 %    Auto Immature Granulocyte %: 0.4 %    Nucleated RBC: 0 /100 WBCs    Lipid Profile in AM (07.13.19 @ 04:09)    Total Cholesterol/HDL Ratio Measurement: 2.9 Ratio    Cholesterol, Serum: 126 mg/dL    Triglycerides, Serum: 104 mg/dL    HDL Cholesterol, Serum: 43: HDL Levels >/= 60 mg/dL are considered beneficial and a "negative" risk  factor.  Effective 08/15/2018: New reference range and interpretive comment. mg/dL    Direct LDL: 72: LDL Cholesterol (mg/dL) --- Interpretive Comment (for adults 18 and over)    Hemoglobin A1C with Mean Plasma Glucose (07.13.19 @ 04:09)    Hemoglobin A1C, Whole Blood: 6.1: Method: Immunoassay         Mean Plasma Glucose: 128 mg/dL        Radiology

## 2019-07-14 NOTE — PROGRESS NOTE ADULT - ASSESSMENT
Assessment and Recommendation:   · Assessment		  IMPRESSION:  Ischemic right MCA CVA s/p thrombectomy   HO CVA   HO CAD s/p CABG    Ct showed R temp lobe infarct    PLAN:    CNS: Neuro checks q 1; Repeat CT-H in 24 hrs or earlier if worsening status; ASA, Lipitor per neuro; Thiamine, folate; CIWA protocol;     HEENT: Oral care     PULMONARY: HOB at 30 degrees; Cxr    CARDIOVASCULAR: Keep I=O; NS at 75 cc/hr; TTE     GI: GI prophylaxis.  Feeding once pass S/S    RENAL: FU lytes; Check Mg, Keep 2-3;     INFECTIOUS DISEASE: Panculture     HEMATOLOGICAL:  DVT prophylaxis--> SCD;     ENDOCRINE:  Follow up FS.  Insulin protocol if needed.    MUSCULOSKELETAL: Bedrest for now     Pt can be tarnfred to medical floor if no [facility] bed

## 2019-07-14 NOTE — PROGRESS NOTE ADULT - SUBJECTIVE AND OBJECTIVE BOX
ICU Attending Progress Daily Note     2019 13:47    He has history of DVT, lower extremity  Stroke  HLD (hyperlipidemia)  CAD (coronary artery disease)    Interval event for past 24 hr:  ARELI PITTMAN  68y had no event.   Current Complains:  ARELI PITTMAN has no new complains no new neuro deficit pt able to walk   HPI:  Mr. Pittman is a 67 yo male patient, with a PMH of CAD s/p stenting and CABG 10 years ago, history of CVA more than 10 years ago with minimal residual left upper extremity weakness, lower extremities DVT in 2019 when he was in [facility] treated for a short period of time with s/c injections and oral anticoagulation, on aspirin, functional at baseline, was brought in by his family due to unwitnessed fall this AM in the bathroom. LKW is 10:30 PM yesterday. Initially pt presented with right gaze and left sided weakness which improved upon presentation.    In ED, code stroke was called, he was evaluated by the neurology service, NIHSS of 4. No TPA as LKW >4.5 hours. CTP (+) distal M1 occlusion w large penumbra, code NI activated. Post CTA/CTP his NIHSS was 7. Delay in neurointervention inintially as family was not agreeing to intervention. Patient then transferred to  IR suite for mechanical thrombectomy. (2019 13:00)    OBJECTIVE:  ICU Vital Signs Last 24 Hrs  T(C): 35.8 (2019 12:00), Max: 37.1 (2019 00:00)  T(F): 96.4 (2019 12:00), Max: 98.8 (2019 00:00)  HR: 54 (2019 13:00) (44 - 66)  BP: 138/58 (2019 12:00) (100/54 - 140/53)  BP(mean): 100 (2019 12:00) (67 - 106)  ABP: --  ABP(mean): --  RR: 27 (2019 13:00) (13 - 27)  SpO2: 97% (2019 12:00) (95% - 100%)    I&O's Summary    2019 07:01  -  2019 07:00  --------------------------------------------------------  IN: 3095 mL / OUT: 2680 mL / NET: 415 mL    2019 07:  -  2019 13:47  --------------------------------------------------------  IN: 720 mL / OUT: 800 mL / NET: -80 mL      I&O's Detail    2019 07:  -  2019 07:00  --------------------------------------------------------  IN:    Oral Fluid: 1020 mL    sodium chloride 0.9%: 1800 mL    sodium chloride 0.9%: 275 mL  Total IN: 3095 mL    OUT:    Indwelling Catheter - Urethral: 1115 mL    Voided: 1565 mL  Total OUT: 2680 mL    Total NET: 415 mL      2019 07:  -  2019 13:47  --------------------------------------------------------  IN:    Oral Fluid: 720 mL  Total IN: 720 mL    OUT:    Voided: 800 mL  Total OUT: 800 mL    Total NET: -80 mL        CAPILLARY BLOOD GLUCOSE      POCT Blood Glucose.: 87 mg/dL (2019 12:42)  POCT Blood Glucose.: 105 mg/dL (2019 05:31)  POCT Blood Glucose.: 106 mg/dL (2019 23:58)  POCT Blood Glucose.: 118 mg/dL (2019 17:16)    LABS:                          11.9   8.04  )-----------( 156      ( 2019 04:06 )             35.2     07-14    140  |  107  |  15  ----------------------------<  120<H>  4.2   |  24  |  0.8    Ca    8.1<L>      2019 04:06  Mg     3.0     07-14    TPro  5.6<L>  /  Alb  3.0<L>  /  TBili  0.8  /  DBili  x   /  AST  15  /  ALT  12  /  AlkPhos  71  07-14      Urinalysis Basic - ( 2019 21:00 )    Color: Light Yellow / Appearance: Clear / S.028 / pH: x  Gluc: x / Ketone: Negative  / Bili: Negative / Urobili: <2 mg/dL   Blood: x / Protein: Negative / Nitrite: Negative   Leuk Esterase: Negative / RBC: 10-25 /HPF / WBC x   Sq Epi: x / Non Sq Epi: few /HPF / Bacteria: Occasional        Home Medications:  ASPIRIN      TAB 81MG EC: 1  orally once a day (2019 13:00)  ATORVASTATIN TAB 20M  orally once a day (2019 13:00)  BISOPROL FUM TAB 5M  orally once a day (2019 13:00)  ENALAPRIL    TAB 10M  orally once a day (2019 13:00)  SPIRONOLACT  TAB 25M  orally once a day (2019 13:00)    HOSPITAL MEDICATIONS:  MEDICATIONS  (STANDING):  aspirin 325 milliGRAM(s) Oral daily  atorvastatin 80 milliGRAM(s) Oral at bedtime  chlorhexidine 4% Liquid 1 Application(s) Topical <User Schedule>  heparin  Injectable 5000 Unit(s) SubCutaneous every 12 hours  pantoprazole  Injectable 40 milliGRAM(s) IV Push daily  thiamine Injectable 100 milliGRAM(s) IV Push daily    MEDICATIONS  (PRN):      REVIEW OF SYSTEMS:  CONSTITUTIONAL: [X] all negative; [ ] weakness, [ ] fevers, [ ] chills  EYES/ENT: [X] all negative; [ ] visual changes, [ ] vertigo, [ ] throat pain   NECK: [X] all negative; [ ] pain, [ ] stiffness  RESPIRATORY: [] all negative, [ ] cough, [ ] wheezing, [ ] hemoptysis, [ ] shortness of breath  CARDIOVASCULAR: [] all negative; [ ] chest pain, [ ] palpitations, [ ] orthopnea  GASTROINTESTINAL: [X] all negative; [ ]abdominal pain, [ ] nausea, [ ] vomiting, [ ] hematemesis, [ ] diarrhea, [ ] constipation, [ ] melena, [ ] hematochezia.  GENITOURINARY: [X] all negative; [ ] dysuria, [ ] frequency, [ ] hematuria  NEUROLOGICAL: [X] all negative; [ ] numbness, [ ] weakness  SKIN: [X] all negative; [ ] itching, [ ] burning, [ ] rashes, [ ] lesions   All other review of systems is negative unless indicated above.    [  ] Unable to assess ROS because     PHYSICAL EXAM:          CONSTITUTIONAL: Well-developed; well-nourished; in no acute distress.   	SKIN: warm, dry  	HEAD: Normocephalic; atraumatic.  	EYES: PERRL, EOM, no conj injection, sclera clear  	ENT: No nasal discharge; airway clear.  	NECK: Supple; non tender.  No midline ttp ctls  	CARD: S1, S2 normal; no murmurs, gallops, or rubs. Regular rate and rhythm. 2+ RPs and DPs bilat, no carotid bruits, no pedal   edema, no calf pain b/l  	RESP: CTA  bilat good air movement No wheezes, rales or rhonchi.  	ABD: Soft, not tender, not distended, no CVA ttp no rebound or guarding, bowel sounds present  	EXT: Normal ROM.  No clubbing, cyanosis or edema.   	  	NEURO: Alert, awake, motor 5/5 R, 5/5 L        RADIOLOGY:  xray    < from: CT Head No Cont (19 @ 14:51) >  IMPRESSION:    In comparison with the prior noncontrast CT scan of the brain dated 2019:    Interval development of a right temporal lobe infarct.    < end of copied text >

## 2019-07-14 NOTE — PROGRESS NOTE ADULT - ASSESSMENT
68y Male with h/o HTN, DM s/p Right MCA M1 clot s/p Right MCA thrombectomy with full recanalization (TICI 3) . Nihss is 2 on this exam. Patient was stable overnight      Plan:   Follow up pending results of HCT   Neuro checks q 1H  Continue ASA daily with statin  N/c mri brain  Echo with bubble study  pt/rehab  please call for acute change

## 2019-07-15 LAB
ALBUMIN SERPL ELPH-MCNC: 2.9 G/DL — LOW (ref 3.5–5.2)
ALP SERPL-CCNC: 69 U/L — SIGNIFICANT CHANGE UP (ref 30–115)
ALT FLD-CCNC: 12 U/L — SIGNIFICANT CHANGE UP (ref 0–41)
ANION GAP SERPL CALC-SCNC: 10 MMOL/L — SIGNIFICANT CHANGE UP (ref 7–14)
APPEARANCE UR: CLEAR — SIGNIFICANT CHANGE UP
AST SERPL-CCNC: 15 U/L — SIGNIFICANT CHANGE UP (ref 0–41)
BASOPHILS # BLD AUTO: 0.01 K/UL — SIGNIFICANT CHANGE UP (ref 0–0.2)
BASOPHILS NFR BLD AUTO: 0.2 % — SIGNIFICANT CHANGE UP (ref 0–1)
BILIRUB SERPL-MCNC: 0.8 MG/DL — SIGNIFICANT CHANGE UP (ref 0.2–1.2)
BILIRUB UR-MCNC: NEGATIVE — SIGNIFICANT CHANGE UP
BUN SERPL-MCNC: 19 MG/DL — SIGNIFICANT CHANGE UP (ref 10–20)
CALCIUM SERPL-MCNC: 8.4 MG/DL — LOW (ref 8.5–10.1)
CHLORIDE SERPL-SCNC: 104 MMOL/L — SIGNIFICANT CHANGE UP (ref 98–110)
CO2 SERPL-SCNC: 24 MMOL/L — SIGNIFICANT CHANGE UP (ref 17–32)
COLOR SPEC: SIGNIFICANT CHANGE UP
CREAT SERPL-MCNC: 0.9 MG/DL — SIGNIFICANT CHANGE UP (ref 0.7–1.5)
DIFF PNL FLD: SIGNIFICANT CHANGE UP
EOSINOPHIL # BLD AUTO: 0.29 K/UL — SIGNIFICANT CHANGE UP (ref 0–0.7)
EOSINOPHIL NFR BLD AUTO: 4.7 % — SIGNIFICANT CHANGE UP (ref 0–8)
GLUCOSE BLDC GLUCOMTR-MCNC: 100 MG/DL — HIGH (ref 70–99)
GLUCOSE SERPL-MCNC: 109 MG/DL — HIGH (ref 70–99)
GLUCOSE UR QL: NEGATIVE — SIGNIFICANT CHANGE UP
HCT VFR BLD CALC: 34.7 % — LOW (ref 42–52)
HGB BLD-MCNC: 11.7 G/DL — LOW (ref 14–18)
IMM GRANULOCYTES NFR BLD AUTO: 0.3 % — SIGNIFICANT CHANGE UP (ref 0.1–0.3)
KETONES UR-MCNC: NEGATIVE — SIGNIFICANT CHANGE UP
LEUKOCYTE ESTERASE UR-ACNC: NEGATIVE — SIGNIFICANT CHANGE UP
LYMPHOCYTES # BLD AUTO: 2.06 K/UL — SIGNIFICANT CHANGE UP (ref 1.2–3.4)
LYMPHOCYTES # BLD AUTO: 33.3 % — SIGNIFICANT CHANGE UP (ref 20.5–51.1)
MAGNESIUM SERPL-MCNC: 1.9 MG/DL — SIGNIFICANT CHANGE UP (ref 1.8–2.4)
MCHC RBC-ENTMCNC: 30.6 PG — SIGNIFICANT CHANGE UP (ref 27–31)
MCHC RBC-ENTMCNC: 33.7 G/DL — SIGNIFICANT CHANGE UP (ref 32–37)
MCV RBC AUTO: 90.8 FL — SIGNIFICANT CHANGE UP (ref 80–94)
MONOCYTES # BLD AUTO: 0.8 K/UL — HIGH (ref 0.1–0.6)
MONOCYTES NFR BLD AUTO: 12.9 % — HIGH (ref 1.7–9.3)
NEUTROPHILS # BLD AUTO: 3.01 K/UL — SIGNIFICANT CHANGE UP (ref 1.4–6.5)
NEUTROPHILS NFR BLD AUTO: 48.6 % — SIGNIFICANT CHANGE UP (ref 42.2–75.2)
NITRITE UR-MCNC: NEGATIVE — SIGNIFICANT CHANGE UP
NRBC # BLD: 0 /100 WBCS — SIGNIFICANT CHANGE UP (ref 0–0)
PH UR: 7 — SIGNIFICANT CHANGE UP (ref 5–8)
PLATELET # BLD AUTO: 149 K/UL — SIGNIFICANT CHANGE UP (ref 130–400)
POTASSIUM SERPL-MCNC: 4.3 MMOL/L — SIGNIFICANT CHANGE UP (ref 3.5–5)
POTASSIUM SERPL-SCNC: 4.3 MMOL/L — SIGNIFICANT CHANGE UP (ref 3.5–5)
PROT SERPL-MCNC: 5.5 G/DL — LOW (ref 6–8)
PROT UR-MCNC: SIGNIFICANT CHANGE UP
RBC # BLD: 3.82 M/UL — LOW (ref 4.7–6.1)
RBC # FLD: 12.4 % — SIGNIFICANT CHANGE UP (ref 11.5–14.5)
SODIUM SERPL-SCNC: 138 MMOL/L — SIGNIFICANT CHANGE UP (ref 135–146)
SP GR SPEC: 1.02 — SIGNIFICANT CHANGE UP (ref 1.01–1.02)
UROBILINOGEN FLD QL: SIGNIFICANT CHANGE UP
WBC # BLD: 6.19 K/UL — SIGNIFICANT CHANGE UP (ref 4.8–10.8)
WBC # FLD AUTO: 6.19 K/UL — SIGNIFICANT CHANGE UP (ref 4.8–10.8)

## 2019-07-15 PROCEDURE — 93306 TTE W/DOPPLER COMPLETE: CPT | Mod: 26

## 2019-07-15 PROCEDURE — 71045 X-RAY EXAM CHEST 1 VIEW: CPT | Mod: 26

## 2019-07-15 RX ORDER — DIGOXIN 250 MCG
0.25 TABLET ORAL ONCE
Refills: 0 | Status: DISCONTINUED | OUTPATIENT
Start: 2019-07-15 | End: 2019-07-15

## 2019-07-15 RX ADMIN — Medication 100 MILLIGRAM(S): at 13:33

## 2019-07-15 RX ADMIN — Medication 325 MILLIGRAM(S): at 13:10

## 2019-07-15 RX ADMIN — HEPARIN SODIUM 5000 UNIT(S): 5000 INJECTION INTRAVENOUS; SUBCUTANEOUS at 05:32

## 2019-07-15 RX ADMIN — HEPARIN SODIUM 5000 UNIT(S): 5000 INJECTION INTRAVENOUS; SUBCUTANEOUS at 17:21

## 2019-07-15 RX ADMIN — ATORVASTATIN CALCIUM 80 MILLIGRAM(S): 80 TABLET, FILM COATED ORAL at 21:23

## 2019-07-15 RX ADMIN — CHLORHEXIDINE GLUCONATE 1 APPLICATION(S): 213 SOLUTION TOPICAL at 05:32

## 2019-07-15 RX ADMIN — PANTOPRAZOLE SODIUM 40 MILLIGRAM(S): 20 TABLET, DELAYED RELEASE ORAL at 13:06

## 2019-07-15 NOTE — CONSULT NOTE ADULT - SUBJECTIVE AND OBJECTIVE BOX
HPI:  Mr. Lomeli is a 67 yo male patient, with a PMH of CAD s/p stenting and CABG 10 years ago, history of CVA more than 10 years ago with minimal residual left upper extremity weakness, lower extremities DVT in 2019 when he was in [facility] treated for a short period of time with s/c injections and oral anticoagulation, on aspirin, functional at baseline, was brought in by his family due to unwitnessed fall this AM in the bathroom. LKW is 10:30 PM yesterday. Initially pt presented with right gaze and left sided weakness which improved upon presentation.    In ED, code stroke was called, he was evaluated by the neurology service, NIHSS of 4. No TPA as LKW >4.5 hours. CTP (+) distal M1 occlusion w large penumbra, code NI activated. Post CTA/CTP his NIHSS was 7. Delay in neurointervention inintially as family was not agreeing to intervention. Patient then transferred to  IR suite for mechanical thrombectomy. (2019 13:00)      PAST MEDICAL & SURGICAL HISTORY:  DVT, lower extremity: 2019, not currently on anticoagulation  Stroke: 10 years ago as per MD HALE (hyperlipidemia)  CAD (coronary artery disease): s/p stenting and CABG 10 years ago  S/P CABG (coronary artery bypass graft)      Hospital Course:    TODAY'S SUBJECTIVE & REVIEW OF SYMPTOMS:     Constitutional WNL   Cardio WNL   Resp WNL   GI WNL  Heme WNL  Endo WNL  Skin WNL  MSK WNL  Neuro left side weakness  Cognitive WNL  Psych WNL      MEDICATIONS  (STANDING):  aspirin 325 milliGRAM(s) Oral daily  atorvastatin 80 milliGRAM(s) Oral at bedtime  chlorhexidine 4% Liquid 1 Application(s) Topical <User Schedule>  heparin  Injectable 5000 Unit(s) SubCutaneous every 12 hours  pantoprazole  Injectable 40 milliGRAM(s) IV Push daily    MEDICATIONS  (PRN):      FAMILY HISTORY:  No pertinent family history in first degree relatives      Allergies    No Known Allergies    Intolerances        SOCIAL HISTORY:    [  ] Etoh  [  ] Smoking  [  ] Substance abuse     Home Environment:  [  ] Home Alone  [ x ] Lives with Family  [  ] Home Health Aid    Dwelling:  [  ] Apartment  [ x ] Private House  [  ] Adult Home  [  ] Skilled Nursing Facility      [  ] Short Term  [  ] Long Term  [  ] Stairs       Elevator [  ]    FUNCTIONAL STATUS PTA: (Check all that apply)  Ambulation: [  x ]Independent    [  ] Dependent     [  ] Non-Ambulatory  Assistive Device: [  ] SA Cane  [  ]  Q Cane  [  ] Walker  [  ]  Wheelchair  ADL : [ x ] Independent  [  ]  Dependent       Vital Signs Last 24 Hrs  T(C): 36.3 (15 Jul 2019 08:00), Max: 38.8 (2019 20:00)  T(F): 97.4 (15 Jul 2019 08:00), Max: 101.8 (2019 20:00)  HR: 50 (15 Jul 2019 11:00) (42 - 62)  BP: 115/59 (15 Jul 2019 10:00) (99/67 - 122/52)  BP(mean): 76 (15 Jul 2019 10:00) (70 - 94)  RR: 24 (15 Jul 2019 11:00) (6 - 32)  SpO2: 95% (15 Jul 2019 11:00) (90% - 98%)      PHYSICAL EXAM: Alert & awake  GENERAL: NAD, well-groomed, well-developed  HEAD:  Atraumatic, Normocephalic  CHEST/LUNG: Clear   HEART: S1S2+  ABDOMEN: Soft, Nontender  EXTREMITIES:  no calf tenderness    NERVOUS SYSTEM:  Cranial Nerves 2-12 intact [  ] Abnormal  [  ]  ROM: WFL all extremities [x  ]  Abnormal [  ]  Motor Strength: WFL all extremities  [x  ]  Abnormal [  ]  Sensation: intact to light touch [  ] Abnormal [  ]  Reflexes: Symmetric [  ]  Abnormal [  ]    FUNCTIONAL STATUS:  Bed Mobility: Independent [  ]  Supervision [  ]  Needs Assistance [ x ]  N/A [  ]  Transfers: Independent [  ]  Supervision [  ]  Needs Assistance [ x ]  N/A [  ]   Ambulation: Independent [  ]  Supervision [  ]  Needs Assistance [  ]  N/A [  ]  ADL: Independent [  ] Requires Assistance [  ] N/A [  ]      LABS:                        11.7   6.19  )-----------( 149      ( 15 Jul 2019 04:15 )             34.7     07-15    138  |  104  |  19  ----------------------------<  109<H>  4.3   |  24  |  0.9    Ca    8.4<L>      15 Jul 2019 04:15  Mg     1.9     07-15    TPro  5.5<L>  /  Alb  2.9<L>  /  TBili  0.8  /  DBili  x   /  AST  15  /  ALT  12  /  AlkPhos  69  07-15      Urinalysis Basic - ( 15 Jul 2019 10:42 )    Color: Light Yellow / Appearance: Clear / S.018 / pH: x  Gluc: x / Ketone: Negative  / Bili: Negative / Urobili: <2 mg/dL   Blood: x / Protein: Trace / Nitrite: Negative   Leuk Esterase: Negative / RBC: x / WBC x   Sq Epi: x / Non Sq Epi: x / Bacteria: x        RADIOLOGY & ADDITIONAL STUDIES:    Assesment:

## 2019-07-15 NOTE — PROGRESS NOTE ADULT - ASSESSMENT
IMPRESSION:  Ischemic right MCA CVA s/p thrombectomy   HO CVA   Distal DVT   HO CAD s/p CABG    PLAN:    CNS: FU with Neuro     HEENT: Oral care.     PULMONARY: HOB at 30 degrees;     CARDIOVASCULAR: Keep I=O; FU Echo with bubble    GI: GI prophylaxis.  Feeding per S/S    RENAL: FU lytes; Check Mg, Keep 2-3;     INFECTIOUS DISEASE: Panculture.      HEMATOLOGICAL:  DVT prophylaxis.  Duplex in AM     ENDOCRINE:  Follow up FS.    MUSCULOSKELETAL: OOB to chair    Transfer to floor

## 2019-07-15 NOTE — PROGRESS NOTE ADULT - ASSESSMENT
Assessment:  This is a 68y Male with h/o HTN, DM s/p Right MCA M1 LVO s/p Right MCA thrombectomy with 4 passes with combination of aspiration and stent retriever with TICI 3 realized.    Impression  # Ischemic right MCA CVA s/p thrombectomy  #Hx Alcohol use   # Hx of CVA  # Hx of CAD s/p CABG       #Ischemic Right MCA CVA s/p Thrombectomy   - 7/13 CT- R temporal lobe infarct  --140  -Neuro Checks q1 hr   -Stat head CT for mental status changes   - Contonue Aspirin and Statin   - Magnesium 2-3   - Neuro following  - will need pt/rehab    #Hx of CVA   -Aspirin and Statin    #Hx of CAD s/p CABG  - Aspirin and Statin  - echo today    #Superficial Thrombophlebitis of left gastrocnemius on duplex 7/13  - Will repeat duplex US tomorrow    #Fever  - will f/u pan cultures    GI  -ppx  - Dysphagia 1  - F/U speech and swallow will repeat today      DVT ppx- heaprin subq  -Sequentials   Ambulating as tolerating, is OOB, walking  Dispo: home , likely downgrade to [facility] Assessment:  This is a 68y Male with h/o HTN, CAD s/p CABG 10 years ago, CVA more than 10 years ago with minimal left residual weakness, and provoked DVT in January who presented with right gaze and left sided weakness after an unwitnessed fall and found to have a R distal M1 occlusion (outside of TPA window) for which he went mechanical thrombectomy by IR.    Impression  # Ischemic right MCA CVA s/p thrombectomy  #Hx Alcohol use   # Hx of CVA  # Hx of CAD s/p CABG       #Ischemic Right MCA CVA s/p Thrombectomy   - repeat 7/13 CT-  R temporal lobe infarct  - -140  - Neuro Checks q1 hr   - Stat head CT for mental status changes   - Continue Aspirin and Statin   - Magnesium 2-3   - Neuro following  - downgrade to stroke unit  - per physiatry candidate for intensive hospital based rehab  - f/u echo  - currently dysphagia 1-pureed nectar diet, will have repeat eval by S+S today  - consider MRI per neuro    #Hx of CAD s/p CABG  - Aspirin and Statin  - echo today    #DVT in January  - only positive for Superficial Thrombophlebitis of left gastrocnemius on duplex 7/13  - Will repeat duplex US tomorrow  - heparin 5k u subq q12    #Fever  - fever 101.8 on 7/14  - will f/u pan cultures    #HTN  -holding home meds: enalapril, aldactone, bisoprolol      Diet: dysphagia 1- pureed nectar  DVT ppx- heaprin subq, Sequentials   Ambulating as tolerating, is OOB, walking  Dispo: home , likely downgrade to   Full-code

## 2019-07-15 NOTE — PROGRESS NOTE ADULT - SUBJECTIVE AND OBJECTIVE BOX
Patient is a 68y old  Male who presents with a chief complaint of Fall + facial droop (14 Jul 2019 13:46)        Over Night Events: NO events overnight.  No SOB no HA        ROS:  See HPI    PHYSICAL EXAM    ICU Vital Signs Last 24 Hrs  T(C): 36.5 (15 Jul 2019 04:00), Max: 38.8 (14 Jul 2019 20:00)  T(F): 97.7 (15 Jul 2019 04:00), Max: 101.8 (14 Jul 2019 20:00)  HR: 50 (15 Jul 2019 07:00) (42 - 62)  BP: 105/60 (15 Jul 2019 06:00) (99/67 - 138/67)  BP(mean): 70 (15 Jul 2019 06:00) (70 - 106)  ABP: --  ABP(mean): --  RR: 24 (15 Jul 2019 07:00) (6 - 32)  SpO2: 95% (15 Jul 2019 07:00) (90% - 99%)      General: in NAD   HEENT: DIONICIO  Left facial droop           Lymphatic system: No cervical LN   Lungs: Bilateral BS  Cardiovascular: Regular   Gastrointestinal: Soft, Positive BS  Extremities: No clubbing.  Moves extremities.  Full Range of motion   Skin: Warm, intact  Neurological: improved lerft sided weakness       07-14-19 @ 07:01  -  07-15-19 @ 07:00  --------------------------------------------------------  IN:    Oral Fluid: 1080 mL  Total IN: 1080 mL    OUT:    Voided: 1650 mL  Total OUT: 1650 mL    Total NET: -570 mL          LABS:                            11.7   6.19  )-----------( 149      ( 15 Jul 2019 04:15 )             34.7                                               07-15    138  |  104  |  19  ----------------------------<  109<H>  4.3   |  24  |  0.9    Ca    8.4<L>      15 Jul 2019 04:15  Mg     1.9     07-15    TPro  5.5<L>  /  Alb  2.9<L>  /  TBili  0.8  /  DBili  x   /  AST  15  /  ALT  12  /  AlkPhos  69  07-15                                                                                           LIVER FUNCTIONS - ( 15 Jul 2019 04:15 )  Alb: 2.9 g/dL / Pro: 5.5 g/dL / ALK PHOS: 69 U/L / ALT: 12 U/L / AST: 15 U/L / GGT: x                                                                                                                                       MEDICATIONS  (STANDING):  aspirin 325 milliGRAM(s) Oral daily  atorvastatin 80 milliGRAM(s) Oral at bedtime  chlorhexidine 4% Liquid 1 Application(s) Topical <User Schedule>  heparin  Injectable 5000 Unit(s) SubCutaneous every 12 hours  pantoprazole  Injectable 40 milliGRAM(s) IV Push daily  thiamine Injectable 100 milliGRAM(s) IV Push daily    MEDICATIONS  (PRN):      Xrays:          No infiltrates                                                                            ECHO

## 2019-07-15 NOTE — PROGRESS NOTE ADULT - SUBJECTIVE AND OBJECTIVE BOX
Hospital Day:  3d    Subjective:    Patient is a 68y old  Male who presents with a chief complaint of Fall + facial droop (15 Jul 2019 07:59)    Overnight patient was found to have a fever of 101.8. Otherwise, no overnight complaints. Patient reports being much better.      Past Medical Hx:   DVT, lower extremity  Stroke  HLD (hyperlipidemia)  HTN (hypertension)  CAD (coronary artery disease)    Past Sx:  S/P CABG (coronary artery bypass graft)    Allergies:  No Known Allergies    Current Meds:   Standng Meds:  aspirin 325 milliGRAM(s) Oral daily  atorvastatin 80 milliGRAM(s) Oral at bedtime  chlorhexidine 4% Liquid 1 Application(s) Topical <User Schedule>  heparin  Injectable 5000 Unit(s) SubCutaneous every 12 hours  pantoprazole  Injectable 40 milliGRAM(s) IV Push daily  thiamine Injectable 100 milliGRAM(s) IV Push daily    PRN Meds:    HOME MEDICATIONS:  ASPIRIN      TAB 81MG EC: 1  orally once a day  ATORVASTATIN TAB 20M  orally once a day  BISOPROL FUM TAB 5M  orally once a day  ENALAPRIL    TAB 10M  orally once a day  SPIRONOLACT  TAB 25M  orally once a day      Vital Signs:   T(F): 97.4 (07-15-19 @ 08:00), Max: 101.8 (19 @ 20:00)  HR: 50 (07-15-19 @ 11:00) (42 - 62)  BP: 115/59 (07-15-19 @ 10:00) (99/67 - 122/52)  RR: 24 (07-15-19 @ 11:00) (6 - 32)  SpO2: 95% (07-15-19 @ 11:00) (90% - 98%)      19 @ 07:01  -  07-15-19 @ 07:00  --------------------------------------------------------  IN: 1080 mL / OUT: 1650 mL / NET: -570 mL    07-15-19 @ 07:01  -  07-15-19 @ 13:24  --------------------------------------------------------  IN: 0 mL / OUT: 300 mL / NET: -300 mL        Physical Exam:   GENERAL: NAD  HEENT: NCAT, mild left facial droop  CHEST/LUNG: CTAB  HEART: Regular rate and rhythm; s1 s2 appreciated, No murmurs, rubs, or gallops  ABDOMEN: Soft, Nontender, Nondistended; Bowel sounds present  EXTREMITIES: No LE edema b/l  NERVOUS SYSTEM:  Alert & Oriented X3        Labs:                         11.7   6.19  )-----------( 149      ( 15 Jul 2019 04:15 )             34.7     Neutophil% 48.6, Lymphocyte% 33.3, Monocyte% 12.9, Bands% 0.3 07-15-19 @ 04:15    15 Jul 2019 04:15    138    |  104    |  19     ----------------------------<  109    4.3     |  24     |  0.9      Ca    8.4        15 Jul 2019 04:15  Mg     1.9       15 Jul 2019 04:15    TPro  5.5    /  Alb  2.9    /  TBili  0.8    /  DBili  x      /  AST  15     /  ALT  12     /  AlkPhos  69     15 Jul 2019 04:15          Hemoglobin A1C, Whole Blood: 6.1 % (19 @ 04:09)      Troponin <0.01, CKMB --, CK -- 19 @ 07:10        Urinalysis Basic - ( 15 Jul 2019 10:42 )    Color: Light Yellow / Appearance: Clear / S.018 / pH: x  Gluc: x / Ketone: Negative  / Bili: Negative / Urobili: <2 mg/dL   Blood: x / Protein: Trace / Nitrite: Negative   Leuk Esterase: Negative / RBC: x / WBC x   Sq Epi: x / Non Sq Epi: x / Bacteria: x            Radiology:       Assessment and Plan:     DVT ppx:    GI ppx:     Code Status:     DISPO: Hospital Day:  3d    Subjective:    Patient is a 68y old  Male who presents with a chief complaint of Fall + facial droop (15 Jul 2019 07:59)    Overnight patient was found to have a fever of 101.8. Otherwise, no overnight complaints. Patient reports being much better.      Past Medical Hx:   DVT, lower extremity  Stroke  HLD (hyperlipidemia)  HTN (hypertension)  CAD (coronary artery disease)    Past Sx:  S/P CABG (coronary artery bypass graft)    Allergies:  No Known Allergies    Current Meds:   Standng Meds:  aspirin 325 milliGRAM(s) Oral daily  atorvastatin 80 milliGRAM(s) Oral at bedtime  chlorhexidine 4% Liquid 1 Application(s) Topical <User Schedule>  heparin  Injectable 5000 Unit(s) SubCutaneous every 12 hours  pantoprazole  Injectable 40 milliGRAM(s) IV Push daily  thiamine Injectable 100 milliGRAM(s) IV Push daily    PRN Meds:    HOME MEDICATIONS:  ASPIRIN      TAB 81MG EC: 1  orally once a day  ATORVASTATIN TAB 20M  orally once a day  BISOPROL FUM TAB 5M  orally once a day  ENALAPRIL    TAB 10M  orally once a day  SPIRONOLACT  TAB 25M  orally once a day      Vital Signs:   T(F): 97.4 (07-15-19 @ 08:00), Max: 101.8 (19 @ 20:00)  HR: 50 (07-15-19 @ 11:00) (42 - 62)  BP: 115/59 (07-15-19 @ 10:00) (99/67 - 122/52)  RR: 24 (07-15-19 @ 11:00) (6 - 32)  SpO2: 95% (07-15-19 @ 11:00) (90% - 98%)      19 @ 07:01  -  07-15-19 @ 07:00  --------------------------------------------------------  IN: 1080 mL / OUT: 1650 mL / NET: -570 mL    07-15-19 @ 07:01  -  07-15-19 @ 13:24  --------------------------------------------------------  IN: 0 mL / OUT: 300 mL / NET: -300 mL        Physical Exam:   GENERAL: NAD  HEENT: NCAT, mild left facial droop  CHEST/LUNG: CTAB  HEART: Regular rate and rhythm; s1 s2 appreciated, No murmurs, rubs, or gallops  ABDOMEN: Soft, Nontender, Nondistended; Bowel sounds present  EXTREMITIES: No LE edema b/l  NERVOUS SYSTEM:  Alert & Oriented X3        Labs:                         11.7   6.19  )-----------( 149      ( 15 Jul 2019 04:15 )             34.7     Neutophil% 48.6, Lymphocyte% 33.3, Monocyte% 12.9, Bands% 0.3 07-15-19 @ 04:15    15 Jul 2019 04:15    138    |  104    |  19     ----------------------------<  109    4.3     |  24     |  0.9      Ca    8.4        15 Jul 2019 04:15  Mg     1.9       15 Jul 2019 04:15    TPro  5.5    /  Alb  2.9    /  TBili  0.8    /  DBili  x      /  AST  15     /  ALT  12     /  AlkPhos  69     15 Jul 2019 04:15          Hemoglobin A1C, Whole Blood: 6.1 % (19 @ 04:09)      Troponin <0.01, CKMB --, CK -- 19 @ 07:10        Urinalysis Basic - ( 15 Jul 2019 10:42 )    Color: Light Yellow / Appearance: Clear / S.018 / pH: x  Gluc: x / Ketone: Negative  / Bili: Negative / Urobili: <2 mg/dL   Blood: x / Protein: Trace / Nitrite: Negative   Leuk Esterase: Negative / RBC: x / WBC x   Sq Epi: x / Non Sq Epi: x / Bacteria: x

## 2019-07-15 NOTE — SWALLOW BEDSIDE ASSESSMENT ADULT - SLP PERTINENT HISTORY OF CURRENT PROBLEM
Mr. Lomeli is a 67 yo male patient, with a PMH of CAD s/p stenting and CABG 10 years ago, history of CVA more than 10 years ago came in R MCA, manual thrombectomy. no TPA

## 2019-07-15 NOTE — CHART NOTE - NSCHARTNOTEFT_GEN_A_CORE
ICU DOWNGRADE NOTE:    68y Male transferred to floor from ICU    Patient is a 68y old Male who presents with a chief complaint of left sided weakness(15 Jul 2019 13:46)    The patient is currently admitted for the primary diagnosis of Stroke    The patient was admitted to the unit for ( 3 ) Days.    The patient was never intubated, and was never on pressors    Indwelling vascular catheters: peripheral IV     Urinary Catheter: no     Disposition: stroke unite     Code Status: full code     ICU COURSE OF EVENTS:  -------------------------------------------------------------------------------------------  Mr. Lomeli is a 69 yo male patient, with a PMH of CAD s/p stenting and CABG 10 years ago, history of CVA more than 10 years ago with minimal residual left upper extremity weakness, lower extremities DVT in January 2019 when he was in [facility] treated for a short period of time with s/c injections and oral anticoagulation, on aspirin, functional at baseline, was brought in by his family due to unwitnessed fall this AM in the bathroom. LKW is 10:30 PM yesterday. Initially pt presented with right gaze and left sided weakness which improved upon presentation.  In ED, code stroke was called, he was evaluated by the neurology service, NIHSS of 4. No TPA as LKW >4.5 hours. CTP (+) distal M1 occlusion w large penumbra, code NI activated. Post CTA/CTP his NIHSS was 7. Delay in neuro-intervention inintially as family was not agreeing to intervention. Patient then transferred to  IR suite for mechanical thrombectomy.  admitted to ICU for mionitoring, during ICU admission; patient neurologically improved, NIHSS is 2 now.    no events was reported in ICU and patient is stable for downgrade to stroke unite as per neurology.     -------------------------------------------------------------------------------------------    Current workup in progress:    SIGN OUT AT 07-15-19 @ 14:01 GIVEN TO: ICU DOWNGRADE NOTE:    68y Male transferred to floor from ICU    Patient is a 68y old Male who presents with a chief complaint of left sided weakness(15 Jul 2019 13:46)    The patient is currently admitted for the primary diagnosis of Stroke    The patient was admitted to the unit for ( 3 ) Days.    The patient was never intubated, and was never on pressors    Indwelling vascular catheters: peripheral IV     Urinary Catheter: no     Disposition: stroke unite     Code Status: full code     ICU COURSE OF EVENTS:  -------------------------------------------------------------------------------------------  Mr. Lomeli is a 69 yo male patient, with a PMH of CAD s/p stenting and CABG 10 years ago, history of CVA more than 10 years ago with minimal residual left upper extremity weakness, lower extremities DVT in January 2019 when he was in [facility] treated for a short period of time with s/c injections and oral anticoagulation, on aspirin, functional at baseline, was brought in by his family due to unwitnessed fall this AM in the bathroom. LKW is 10:30 PM yesterday. Initially pt presented with right gaze and left sided weakness which improved upon presentation.  In ED, code stroke was called, he was evaluated by the neurology service, NIHSS of 4. No TPA as LKW >4.5 hours. CTP (+) distal M1 occlusion w large penumbra, code NI activated. Post CTA/CTP his NIHSS was 7. Delay in neuro-intervention initially as family was not agreeing to intervention. Patient then transferred to  IR suite for mechanical thrombectomy.  admitted to ICU for monitoring, during ICU admission; patient neurologically improved, NIHSS is 2 now.   No events were reported in ICU and patient is stable for downgrade to stroke unite as per neurology.     -------------------------------------------------------------------------------------------    Current workup in progress:    ICU Vital Signs Last 24 Hrs  T(C): 36.6 (15 Jul 2019 12:00), Max: 38.8 (14 Jul 2019 20:00)  T(F): 97.8 (15 Jul 2019 12:00), Max: 101.8 (14 Jul 2019 20:00)  HR: 56 (15 Jul 2019 14:00) (42 - 62)  BP: 110/67 (15 Jul 2019 12:00) (99/67 - 122/52)  BP(mean): 90 (15 Jul 2019 12:00) (70 - 90)  RR: 25 (15 Jul 2019 14:00) (6 - 32)  SpO2: 96% (15 Jul 2019 14:00) (90% - 98%)    Physical Exam:   GENERAL: NAD  HEENT: NCAT, mild left facial droop  CHEST/LUNG: CTAB  HEART: Regular rate and rhythm; s1 s2 appreciated, No murmurs, rubs, or gallops  ABDOMEN: Soft, Nontender, Nondistended; Bowel sounds present  EXTREMITIES: No LE edema b/l  NERVOUS SYSTEM:  Alert & Oriented X3      #Ischemic Right MCA CVA s/p Thrombectomy   - repeat 7/13 CT-  R temporal lobe infarct  - -140  - Neuro Checks q1 hr   - Stat head CT for mental status changes   - Continue Aspirin and Statin   - Magnesium 2-3   - Neuro following  - downgrade to stroke unit  - per physiatry candidate for intensive hospital based rehab  - f/u echo  - currently dysphagia 1-pureed nectar diet, will have repeat eval by S+S today  - consider MRI per neuro    #Hx of CAD s/p CABG  - Aspirin and Statin  - echo today    #DVT in January  - only positive for Superficial Thrombophlebitis of left gastrocnemius on duplex 7/13  - Will repeat duplex US tomorrow  - heparin 5k u subq q12    #Fever  - fever 101.8 on 7/14  - will f/u pan cultures    #HTN  -holding home meds: enalapril, aldactone, bisoprolol      Diet: dysphagia 1- pureed nectar  DVT ppx- heaprin subq, Sequentials   Ambulating as tolerating, is OOB, walking  Dispo: home , likely downgrade to [place]  Full-code    SIGN OUT AT 07-15-19 @ 14:01 GIVEN TO:

## 2019-07-15 NOTE — CONSULT NOTE ADULT - ASSESSMENT
IMPRESSION: Rehab of right cva    PRECAUTIONS: [  ] Cardiac  [  ] Respiratory  [  ] Seizures [  ] Contact Isolation  [  ] Droplet Isolation  [  ] Other    Weight Bearing Status:     RECOMMENDATION:    Out of Bed to Chair     DVT/Decubiti Prophylaxis    REHAB PLAN:     [ x  ] Bedside P/T 3-5 times a week   [ x  ]   Bedside O/T  2-3 times a week             [   ] No Rehab Therapy Indicated                   [   ]  Speech Therapy   Conditioning/ROM                                    ADL  Bed Mobility                                               Conditioning/ROM  Transfers                                                     Bed Mobility  Sitting /Standing Balance                         Transfers                                        Gait Training                                               Sitting/Standing Balance  Stair Training [   ]Applicable                    Home equipment Eval                                                                        Splinting  [   ] Only      GOALS:   ADL   [  x ]   Independent                    Transfers  [ x  ] Independent                          Ambulation  [ x  ] Independent     [  x  ] With device                            [   ]  CG                                                         [   ]  CG                                                                  [   ] CG                            [    ] Min A                                                   [   ] Min A                                                              [   ] Min  A          DISCHARGE PLAN:   [   ]  Good candidate for Intensive Rehabilitation/Hospital based                                             Will tolerate 3hrs Intensive Rehab Daily                                       [    ]  Short Term Rehab in Skilled Nursing Facility                                       [    ]  Home with Outpatient or VN services                                         [ x   ]  Possible Candidate for Intensive Hospital based Rehab

## 2019-07-16 LAB
ALBUMIN SERPL ELPH-MCNC: 3.2 G/DL — LOW (ref 3.5–5.2)
ALP SERPL-CCNC: 75 U/L — SIGNIFICANT CHANGE UP (ref 30–115)
ALT FLD-CCNC: 12 U/L — SIGNIFICANT CHANGE UP (ref 0–41)
ANION GAP SERPL CALC-SCNC: 10 MMOL/L — SIGNIFICANT CHANGE UP (ref 7–14)
ANION GAP SERPL CALC-SCNC: 11 MMOL/L — SIGNIFICANT CHANGE UP (ref 7–14)
APTT BLD: 31.8 SEC — SIGNIFICANT CHANGE UP (ref 27–39.2)
AST SERPL-CCNC: 17 U/L — SIGNIFICANT CHANGE UP (ref 0–41)
BASOPHILS # BLD AUTO: 0.02 K/UL — SIGNIFICANT CHANGE UP (ref 0–0.2)
BASOPHILS NFR BLD AUTO: 0.3 % — SIGNIFICANT CHANGE UP (ref 0–1)
BILIRUB SERPL-MCNC: 0.7 MG/DL — SIGNIFICANT CHANGE UP (ref 0.2–1.2)
BLD GP AB SCN SERPL QL: SIGNIFICANT CHANGE UP
BUN SERPL-MCNC: 18 MG/DL — SIGNIFICANT CHANGE UP (ref 10–20)
BUN SERPL-MCNC: 24 MG/DL — HIGH (ref 10–20)
CALCIUM SERPL-MCNC: 9 MG/DL — SIGNIFICANT CHANGE UP (ref 8.5–10.1)
CALCIUM SERPL-MCNC: 9.2 MG/DL — SIGNIFICANT CHANGE UP (ref 8.5–10.1)
CHLORIDE SERPL-SCNC: 102 MMOL/L — SIGNIFICANT CHANGE UP (ref 98–110)
CHLORIDE SERPL-SCNC: 103 MMOL/L — SIGNIFICANT CHANGE UP (ref 98–110)
CO2 SERPL-SCNC: 26 MMOL/L — SIGNIFICANT CHANGE UP (ref 17–32)
CO2 SERPL-SCNC: 28 MMOL/L — SIGNIFICANT CHANGE UP (ref 17–32)
CREAT SERPL-MCNC: 0.9 MG/DL — SIGNIFICANT CHANGE UP (ref 0.7–1.5)
CREAT SERPL-MCNC: 1 MG/DL — SIGNIFICANT CHANGE UP (ref 0.7–1.5)
CULTURE RESULTS: SIGNIFICANT CHANGE UP
EOSINOPHIL # BLD AUTO: 0.48 K/UL — SIGNIFICANT CHANGE UP (ref 0–0.7)
EOSINOPHIL NFR BLD AUTO: 7.5 % — SIGNIFICANT CHANGE UP (ref 0–8)
GLUCOSE SERPL-MCNC: 114 MG/DL — HIGH (ref 70–99)
GLUCOSE SERPL-MCNC: 121 MG/DL — HIGH (ref 70–99)
HCT VFR BLD CALC: 35.4 % — LOW (ref 42–52)
HCT VFR BLD CALC: 36.5 % — LOW (ref 42–52)
HGB BLD-MCNC: 12.1 G/DL — LOW (ref 14–18)
HGB BLD-MCNC: 12.5 G/DL — LOW (ref 14–18)
IMM GRANULOCYTES NFR BLD AUTO: 0.3 % — SIGNIFICANT CHANGE UP (ref 0.1–0.3)
INR BLD: 1.06 RATIO — SIGNIFICANT CHANGE UP (ref 0.65–1.3)
LYMPHOCYTES # BLD AUTO: 2 K/UL — SIGNIFICANT CHANGE UP (ref 1.2–3.4)
LYMPHOCYTES # BLD AUTO: 31.2 % — SIGNIFICANT CHANGE UP (ref 20.5–51.1)
MAGNESIUM SERPL-MCNC: 1.8 MG/DL — SIGNIFICANT CHANGE UP (ref 1.8–2.4)
MAGNESIUM SERPL-MCNC: 1.8 MG/DL — SIGNIFICANT CHANGE UP (ref 1.8–2.4)
MCHC RBC-ENTMCNC: 30.6 PG — SIGNIFICANT CHANGE UP (ref 27–31)
MCHC RBC-ENTMCNC: 30.7 PG — SIGNIFICANT CHANGE UP (ref 27–31)
MCHC RBC-ENTMCNC: 34.2 G/DL — SIGNIFICANT CHANGE UP (ref 32–37)
MCHC RBC-ENTMCNC: 34.2 G/DL — SIGNIFICANT CHANGE UP (ref 32–37)
MCV RBC AUTO: 89.6 FL — SIGNIFICANT CHANGE UP (ref 80–94)
MCV RBC AUTO: 89.7 FL — SIGNIFICANT CHANGE UP (ref 80–94)
MONOCYTES # BLD AUTO: 0.83 K/UL — HIGH (ref 0.1–0.6)
MONOCYTES NFR BLD AUTO: 12.9 % — HIGH (ref 1.7–9.3)
NEUTROPHILS # BLD AUTO: 3.06 K/UL — SIGNIFICANT CHANGE UP (ref 1.4–6.5)
NEUTROPHILS NFR BLD AUTO: 47.8 % — SIGNIFICANT CHANGE UP (ref 42.2–75.2)
NRBC # BLD: 0 /100 WBCS — SIGNIFICANT CHANGE UP (ref 0–0)
NRBC # BLD: 0 /100 WBCS — SIGNIFICANT CHANGE UP (ref 0–0)
PLATELET # BLD AUTO: 166 K/UL — SIGNIFICANT CHANGE UP (ref 130–400)
PLATELET # BLD AUTO: 193 K/UL — SIGNIFICANT CHANGE UP (ref 130–400)
POTASSIUM SERPL-MCNC: 4.2 MMOL/L — SIGNIFICANT CHANGE UP (ref 3.5–5)
POTASSIUM SERPL-MCNC: 4.8 MMOL/L — SIGNIFICANT CHANGE UP (ref 3.5–5)
POTASSIUM SERPL-SCNC: 4.2 MMOL/L — SIGNIFICANT CHANGE UP (ref 3.5–5)
POTASSIUM SERPL-SCNC: 4.8 MMOL/L — SIGNIFICANT CHANGE UP (ref 3.5–5)
PROT SERPL-MCNC: 6.2 G/DL — SIGNIFICANT CHANGE UP (ref 6–8)
PROTHROM AB SERPL-ACNC: 12.2 SEC — SIGNIFICANT CHANGE UP (ref 9.95–12.87)
RBC # BLD: 3.95 M/UL — LOW (ref 4.7–6.1)
RBC # BLD: 4.07 M/UL — LOW (ref 4.7–6.1)
RBC # FLD: 12.3 % — SIGNIFICANT CHANGE UP (ref 11.5–14.5)
RBC # FLD: 12.4 % — SIGNIFICANT CHANGE UP (ref 11.5–14.5)
SODIUM SERPL-SCNC: 140 MMOL/L — SIGNIFICANT CHANGE UP (ref 135–146)
SODIUM SERPL-SCNC: 140 MMOL/L — SIGNIFICANT CHANGE UP (ref 135–146)
SPECIMEN SOURCE: SIGNIFICANT CHANGE UP
WBC # BLD: 6.41 K/UL — SIGNIFICANT CHANGE UP (ref 4.8–10.8)
WBC # BLD: 6.62 K/UL — SIGNIFICANT CHANGE UP (ref 4.8–10.8)
WBC # FLD AUTO: 6.41 K/UL — SIGNIFICANT CHANGE UP (ref 4.8–10.8)
WBC # FLD AUTO: 6.62 K/UL — SIGNIFICANT CHANGE UP (ref 4.8–10.8)

## 2019-07-16 PROCEDURE — 99233 SBSQ HOSP IP/OBS HIGH 50: CPT

## 2019-07-16 PROCEDURE — 99232 SBSQ HOSP IP/OBS MODERATE 35: CPT

## 2019-07-16 PROCEDURE — 93010 ELECTROCARDIOGRAM REPORT: CPT

## 2019-07-16 PROCEDURE — 93970 EXTREMITY STUDY: CPT | Mod: 26

## 2019-07-16 RX ORDER — ATORVASTATIN CALCIUM 80 MG/1
40 TABLET, FILM COATED ORAL AT BEDTIME
Refills: 0 | Status: DISCONTINUED | OUTPATIENT
Start: 2019-07-16 | End: 2019-07-17

## 2019-07-16 RX ORDER — PANTOPRAZOLE SODIUM 20 MG/1
40 TABLET, DELAYED RELEASE ORAL
Refills: 0 | Status: DISCONTINUED | OUTPATIENT
Start: 2019-07-16 | End: 2019-07-17

## 2019-07-16 RX ORDER — HEPARIN SODIUM 5000 [USP'U]/ML
5000 INJECTION INTRAVENOUS; SUBCUTANEOUS EVERY 8 HOURS
Refills: 0 | Status: DISCONTINUED | OUTPATIENT
Start: 2019-07-16 | End: 2019-07-17

## 2019-07-16 RX ADMIN — Medication 325 MILLIGRAM(S): at 11:54

## 2019-07-16 RX ADMIN — ATORVASTATIN CALCIUM 40 MILLIGRAM(S): 80 TABLET, FILM COATED ORAL at 21:34

## 2019-07-16 RX ADMIN — HEPARIN SODIUM 5000 UNIT(S): 5000 INJECTION INTRAVENOUS; SUBCUTANEOUS at 16:15

## 2019-07-16 RX ADMIN — CHLORHEXIDINE GLUCONATE 1 APPLICATION(S): 213 SOLUTION TOPICAL at 05:34

## 2019-07-16 RX ADMIN — PANTOPRAZOLE SODIUM 40 MILLIGRAM(S): 20 TABLET, DELAYED RELEASE ORAL at 11:54

## 2019-07-16 RX ADMIN — HEPARIN SODIUM 5000 UNIT(S): 5000 INJECTION INTRAVENOUS; SUBCUTANEOUS at 21:34

## 2019-07-16 RX ADMIN — HEPARIN SODIUM 5000 UNIT(S): 5000 INJECTION INTRAVENOUS; SUBCUTANEOUS at 05:34

## 2019-07-16 NOTE — PROGRESS NOTE ADULT - SUBJECTIVE AND OBJECTIVE BOX
HPI:  Mr. Lomeli is a 69 yo male patient, with a PMH of CAD s/p stenting and CABG 10 years ago, history of CVA more than 10 years ago with minimal residual left upper extremity weakness, lower extremities DVT in January 2019 when he was in [facility] treated for a short period of time with s/c injections and oral anticoagulation, on aspirin, functional at baseline, was brought in by his family due to unwitnessed fall this AM in the bathroom. LKW is 10:30 PM yesterday. Initially pt presented with right gaze and left sided weakness which improved upon presentation. S/P right MCA thrombectomy.    Stroke Neurology Follow-Up:  Pt seen at bedside without new complaint, feeling well.    Vital Signs Last 24 Hrs  T(C): 36.6 (15 Jul 2019 16:00), Max: 36.6 (15 Jul 2019 12:00)  T(F): 97.9 (15 Jul 2019 16:00), Max: 97.9 (15 Jul 2019 16:00)  HR: 78 (15 Jul 2019 21:00) (42 - 78)  BP: 106/63 (15 Jul 2019 21:00) (99/67 - 122/52)  BP(mean): 78 (15 Jul 2019 21:00) (70 - 91)  RR: 20 (15 Jul 2019 21:00) (13 - 32)  SpO2: 97% (15 Jul 2019 21:00) (90% - 98%)    NIHSS:     LOC 0  Facial 1  Gaze 0  Visual 0  Motor Arm 0  Motor Leg 0  Sensory 0  Ataxia 0  Dysarthria 1  Language 0  Extinction 0  Total: 2    Allergies    No Known Allergies    Intolerances      MEDICATIONS  (STANDING):  aspirin 325 milliGRAM(s) Oral daily  atorvastatin 80 milliGRAM(s) Oral at bedtime  chlorhexidine 4% Liquid 1 Application(s) Topical <User Schedule>  heparin  Injectable 5000 Unit(s) SubCutaneous every 12 hours  pantoprazole  Injectable 40 milliGRAM(s) IV Push daily    MEDICATIONS  (PRN):      LABS:                        11.7   6.19  )-----------( 149      ( 15 Jul 2019 04:15 )             34.7     07-15    138  |  104  |  19  ----------------------------<  109<H>  4.3   |  24  |  0.9    Ca    8.4<L>      15 Jul 2019 04:15  Mg     1.9     07-15    TPro  5.5<L>  /  Alb  2.9<L>  /  TBili  0.8  /  DBili  x   /  AST  15  /  ALT  12  /  AlkPhos  69  07-15      Hemoglobin A1C, Whole Blood: 6.1 % (07-13 @ 04:09)    < from: CT Head No Cont (07.13.19 @ 14:51) >  Moderate-sized wedge-shaped area of diminished attenuation involving the   right temporal lobe consistent with an area of acute infarction (branch   of the right middle cerebral artery territory).    The third and lateral ventricles are mildly enlarged as are the cortical   sulci consistent with a mild degree of cortical atrophy.  The fourth   ventricle is normal in size and position.    There is no shift of the midline structures or evidence of acute   intracranial hemorrhage.    Small wedge-shaped area of diminished attenuation in the right posterior   parietal region consistent with a chronic infarct.    Small calcified lesion in the interhemispheric fissure likely   representing a meningioma. Chronic fracture deformity of the anterior   wall the right frontal sinus.    Mucosal thickening in the ethmoid, frontal, and sphenoid sinuses.    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    Vascular calcifications are noted.    IMPRESSION:    In comparison with the prior noncontrast CT scan of the brain dated July 12, 2019:    Interval development of a right temporal lobe infarct.    < end of copied text >    < from: Transthoracic Echocardiogram (07.15.19 @ 10:00) >   1. LV Ejection Fraction by Cerda's Method with a biplane EF of 66 %.   2. Normal left ventricular internal cavity size.   3. Normal overall lv function LV wall motion not well seen on   parasternal views. Possible infeior wall hypokinesia present.   4. No evidence of mitral valve regurgitation.   5. Structurally normal mitral valve, with normal leaflet excursion.   6. Sclerotic aortic valve with normal opening.   7. Color flow doppler and intravenous injection of agitated saline   demonstrates the presence of an intact intra atrial septum.   8. The aortic valve mean gradient is 4.9 mmHg consistent with normally   opening aortic valve.    < end of copied text >

## 2019-07-16 NOTE — CONSULT NOTE ADULT - ATTENDING COMMENTS
agree
patient seen and examined  agree with PA note above except as noted  dysarthria persists o/w patient is much improved and non focal. he states he feels well and has no complaints today  cont icu monitoring for now. will likely downgrade tomorrow if stable  cont mg until tomorrow  cont asa and statin  please call with any questions
Patient seen and examined during stroke code with ANDRY Acosta initially and the NP from code NI team.  Patient had improved from when he first arrived but on my exam post CTA/CTP his NIHSS was 7. mrankin 0 as per family.  H/o DVT was on A/C for few months.  Delay in neurointervention as family initially not agreeing to intervention until I spoke with there PMD.    Patient was taken to IR suite for mechanical thrombectomy.    Plan as per neurointervention (post IR checks).

## 2019-07-16 NOTE — CONSULT NOTE ADULT - ASSESSMENT
Mr. Lomeli is a 69 yo male patient, with a PMH of CAD s/p stenting and CABG 10 years ago, history of CVA more than 10 years ago with minimal residual left upper extremity weakness, lower extremities DVT in January 2019 when he was in [facility] treated for a short period of time with s/c injections and oral anticoagulation, on aspirin, functional at baseline, was brought in by his family due to unwitnessed fall. Pt was found to have acute CVA due to occlusion of right MCA s/p mechanical thrombectomy. Cardiology was consulted for evaluation for EDEN.     -NPO after midnight for EDEN in AM  -obtain routine ECG (not in chart)

## 2019-07-16 NOTE — PROGRESS NOTE ADULT - SUBJECTIVE AND OBJECTIVE BOX
Mr. Lomlei is a 67 yo male patient, with a PMH of CAD s/p stenting and CABG 10 years ago, history of CVA more than 10 years ago with minimal residual left upper extremity weakness, lower extremities DVT in January 2019 when he was in [facility] treated for a short period of time with s/c injections and oral anticoagulation, on aspirin, functional at baseline, was brought in by his family due to unwitnessed fall this AM in the bathroom. LKW is 10:30 PM yesterday. Initially pt presented with right gaze and left sided weakness which improved upon presentation.  In ED, code stroke was called, he was evaluated by the neurology service, NIHSS of 4. No TPA as LKW >4.5 hours. CTP (+) distal M1 occlusion w large penumbra, code NI activated. Post CTA/CTP his NIHSS was 7. Delay in neuro-intervention initially as family was not agreeing to intervention. Patient then transferred to  IR suite for mechanical thrombectomy.  admitted to ICU for monitoring, during ICU admission; patient neurologically improved, NIHSS is 2 now.   No events were reported in ICU and patient is stable for downgrade to stroke unite as per neurology.     7/16/19  Spoke with patient and his wife with  ID [id number], and then with patient  ID [id number]. In am patient was asymptomatic just awaiting plan/results. Exam performed with  available.  Returned to convey results and get consent for hypercoagulable workup, let patient know we plan to arrange EDEN, and to clear for MRI. All accomplished, patient acknowledged understanding. Mr. Lomeli is a 69 yo male patient, with a PMH of CAD s/p stenting and CABG 10 years ago, history of CVA more than 10 years ago with minimal residual left upper extremity weakness, lower extremities DVT in 2019 when he was in [facility] treated for a short period of time with s/c injections and oral anticoagulation, on aspirin, functional at baseline, was brought in by his family due to unwitnessed fall this AM in the bathroom. LKW is 10:30 PM yesterday. Initially pt presented with right gaze and left sided weakness which improved upon presentation.  In ED, code stroke was called, he was evaluated by the neurology service, NIHSS of 4. No TPA as LKW >4.5 hours. CTP (+) distal M1 occlusion w large penumbra, code NI activated. Post CTA/CTP his NIHSS was 7. Delay in neuro-intervention initially as family was not agreeing to intervention. Patient then transferred to  IR suite for mechanical thrombectomy.  admitted to ICU for monitoring, during ICU admission; patient neurologically improved, NIHSS is 2 now.   No events were reported in ICU and patient is stable for downgrade to stroke unite as per neurology.     19  Spoke with patient and his wife with  ID [id number], and then with patient  ID [id number]. In am patient was asymptomatic just awaiting plan/results. Exam performed with  available.  Returned to convey results and get consent for hypercoagulable workup, let patient know we plan to arrange EDEN, and to clear for MRI. All accomplished, patient acknowledged understanding. Also spoke with daughter who speaks english and is the HCP, updated her on all plans.     PAST MEDICAL & SURGICAL HISTORY  DVT, lower extremity: 2019, not currently on anticoagulation  Stroke: 10 years ago as per MD HALE (hyperlipidemia)  CAD (coronary artery disease): s/p stenting and CABG 10 years ago  S/P CABG (coronary artery bypass graft)    SOCIAL HISTORY:  Negative for smoking/alcohol/drug use.     ALLERGIES:  No Known Allergies    MEDICATIONS:  STANDING MEDICATIONS  aspirin 325 milliGRAM(s) Oral daily  atorvastatin 40 milliGRAM(s) Oral at bedtime  chlorhexidine 4% Liquid 1 Application(s) Topical <User Schedule>  heparin  Injectable 5000 Unit(s) SubCutaneous every 8 hours  pantoprazole    Tablet 40 milliGRAM(s) Oral before breakfast    PRN MEDICATIONS    VITALS:   T(F): 96.1  HR: 55  BP: 121/59  RR: 18  SpO2: 96%    LABS:                        12.1   6.41  )-----------( 166      ( 2019 05:22 )             35.4     07-16    140  |  103  |  18  ----------------------------<  114<H>  4.2   |  26  |  0.9    Ca    9.0      2019 05:22  Mg     1.8     -    TPro  6.2  /  Alb  3.2<L>  /  TBili  0.7  /  DBili  x   /  AST  17  /  ALT  12  /  AlkPhos  75        Urinalysis Basic - ( 15 Jul 2019 10:42 )    Color: Light Yellow / Appearance: Clear / S.018 / pH: x  Gluc: x / Ketone: Negative  / Bili: Negative / Urobili: <2 mg/dL   Blood: x / Protein: Trace / Nitrite: Negative   Leuk Esterase: Negative / RBC: x / WBC x   Sq Epi: x / Non Sq Epi: x / Bacteria: x            Culture - Urine (collected 15 Jul 2019 10:42)  Source: .Urine Clean Catch (Midstream)  Final Report (2019 10:34):    <10,000 CFU/mL Normal Urogenital Greer              RADIOLOGY:    PHYSICAL EXAM:  GEN: NAD, awake, in bed, Citizen of [place] speaking,  utilized as above.   LUNGS: Clear to auscultation bilaterally at this time  HEART: +s1s2 RRR.   ABD: Soft, NT/ND. (+) bs  EXT: no c/c/e. 2+PP, SEGURA  NEURO: oriented x3, does not fully cooperate with gaze assessment and field assessment despite  Strength 5/5 all four extremities. No facial. Cannot assess for dysarthria properly due to language barrier at this time.

## 2019-07-16 NOTE — PROGRESS NOTE ADULT - ASSESSMENT
#Ischemic Right MCA CVA s/p Thrombectomy   - repeat 7/13 CT showed R temporal lobe infarct  - -140  - Neuro Checks q4 hr   - Stat head CT for mental status changes   - Continue Aspirin and Statin   - Magnesium 2-3   - Neuro following, continue strok eunit  - per physiatry candidate for intensive hospital based rehab  -echo showed normal EF, no PFO but neurology recommends EDEN to definitively rule out PFO given suspected embolic etiology.   -regular diet with thins passed by S+S  -MRI brain ordered  -pending hypercoagulable [id number] patient today for all blood work up to and including any genetic testing necessary.     #Hx of CAD s/p CABG  - Aspirin and Statin  - echo revealed Ef of 66%, Possible inferior wall hypokinesia , no PFO    #DVT in January  - only positive for Superficial Thrombophlebitis of left gastrocnemius on duplex 7/13  -per attendign no need for repeat at this time.   -heparin 5k u subq q8 for prophylaxis per medical attending  -hypercoagulable work-up consent obtained today, to be sent today.     #Fever  - fever 101.8 on 7/14  - urine culture negative  -blood cultures recieved  -currently afebrile and with no elevated WBC count    #HTN  -holding home meds: enalapril, aldactone, bisoprolol    Diet: dysphagia 1- pureed nectar  DVT ppx- heaprin subq, Sequentials   Ambulating as tolerating, is OOB, walking  Dispo: pt/rehab/ot- per physiatry candidate for   Full-code

## 2019-07-16 NOTE — PHYSICAL THERAPY INITIAL EVALUATION ADULT - GENERAL OBSERVATIONS, REHAB EVAL
10:00-10:35 10:00-10:35 Pt encountered standing in bathroom in NAD. Agreeable for PT. No c/o offered at this time . Pt provided with  phone for Angelo # [phone]

## 2019-07-16 NOTE — PROGRESS NOTE ADULT - ASSESSMENT
Impression:  69 y/o man with right temporal infarct s/p right MCA thrombectomy    Plan:  downgrade to stroke unit  PT/OT  continue ASA  decrease Lipitor to 40 mg QD (LDL 72, home dose was 20 mg) Impression:  67 y/o man with right temporal infarct s/p right MCA thrombectomy    Plan:  downgrade to stroke unit  PT/OT  continue ASA  decrease Lipitor to 40 mg QD (LDL 72, home dose was 20 mg)  Send hypercoagulable labs  EDEN

## 2019-07-16 NOTE — CONSULT NOTE ADULT - SUBJECTIVE AND OBJECTIVE BOX
Patient is a 68y old  Male who presents with a chief complaint of Fall + facial droop (2019 11:38)    HPI:  Mr. Lomeli is a 67 yo male patient, with a PMH of CAD s/p stenting and CABG 10 years ago, history of CVA more than 10 years ago with minimal residual left upper extremity weakness, lower extremities DVT in 2019 when he was in [facility] treated for a short period of time with s/c injections and oral anticoagulation, on aspirin, functional at baseline, was brought in by his family due to unwitnessed fall this AM in the bathroom. LKW is 10:30 PM yesterday. Initially pt presented with right gaze and left sided weakness which improved upon presentation.    In ED, code stroke was called, he was evaluated by the neurology service, NIHSS of 4. No TPA as LKW >4.5 hours. CTP (+) distal M1 occlusion w large penumbra, code NI activated. Post CTA/CTP his NIHSS was 7. Delay in neurointervention inintially as family was not agreeing to intervention. Patient then transferred to  IR suite for mechanical thrombectomy. (2019 13:00)    Cardiology fellow addendum: Pt is currently in stroke unit. cardiology was consulted by neurology for EDEN. pt denies any chest pain/ dyspnea. pt had 3 V CABG 10 yrs ago.       ROS:  All other systems reviewed and are negative    PAST MEDICAL & SURGICAL HISTORY  DVT, lower extremity: 2019, not currently on anticoagulation  Stroke: 10 years ago as per MD HALE (hyperlipidemia)  CAD (coronary artery disease): s/p stenting and CABG 10 years ago  S/P CABG (coronary artery bypass graft)      FAMILY HISTORY:  FAMILY HISTORY:  No pertinent family history in first degree relatives      SOCIAL HISTORY:  [-]smoker  [+]Alcohol  []Drug    ALLERGIES:  No Known Allergies      MEDICATIONS:  MEDICATIONS  (STANDING):  aspirin 325 milliGRAM(s) Oral daily  atorvastatin 40 milliGRAM(s) Oral at bedtime  chlorhexidine 4% Liquid 1 Application(s) Topical <User Schedule>  heparin  Injectable 5000 Unit(s) SubCutaneous every 8 hours  pantoprazole  Injectable 40 milliGRAM(s) IV Push daily    MEDICATIONS  (PRN):      HOME MEDICATIONS:  Home Medications:  ASPIRIN      TAB 81MG EC: 1  orally once a day (2019 13:00)  ATORVASTATIN TAB 20M  orally once a day (2019 13:00)  BISOPROL FUM TAB 5M  orally once a day (2019 13:00)  ENALAPRIL    TAB 10M  orally once a day (2019 13:00)  SPIRONOLACT  TAB 25M  orally once a day (2019 13:00)      VITALS:   T(F): 98.3 ( @ 05:58), Max: 101.8 ( @ 20:00)  HR: 56 ( @ 11:47) (42 - 78)  BP: 121/65 ( @ 11:47) (94/45 - 140/53)  BP(mean): 78 ( @ 00:00) (54 - 106)  RR: 19 ( @ 05:58) (6 - 32)  SpO2: 96% ( @ 02:00) (90% - 100%)    I&O's Summary    15 Jul 2019 07:  -  2019 07:00  --------------------------------------------------------  IN: 600 mL / OUT: 900 mL / NET: -300 mL    2019 07:  -  2019 13:18  --------------------------------------------------------  IN: 0 mL / OUT: 2 mL / NET: -2 mL        PHYSICAL EXAM:  GEN: Alert and oriented X 3, No acute distress  HEENT: no pallor  NECK: Supple, no JVD  LUNGS: Clear to auscultation bilaterally  CARDIOVASCULAR: S1/S2 regular, no murmurs or rubs  ABD: Soft, BS+  EXT: No Lower extremity edema/cyanosis  NEURO: AAOx 3    LABS:                        12.1   6.41  )-----------( 166      ( 2019 05:22 )             35.4     -16    140  |  103  |  18  ----------------------------<  114<H>  4.2   |  26  |  0.9    Ca    9.0      2019 05:22  Mg     1.8     -    TPro  6.2  /  Alb  3.2<L>  /  TBili  0.7  /  DBili  x   /  AST  17  /  ALT  12  /  AlkPhos  75  -              Troponin trend:       Chol 126 LDL 72 HDL 43 Trig 104  Hemoglobin A1C   Thyroid      RADIOLOGY:  -CXR:  -CT:  < from: CT Head No Cont (19 @ 14:51) >    IMPRESSION:    In comparison with the prior noncontrast CT scan of the brain dated 2019:    Interval development of a right temporal lobe infarct.    < end of copied text >    < from: CT Perfusion w/ Maps w/ IV Cont (19 @ 07:55) >  Impression:    CTA head: Abrupt occlusion of the distal M1 segment of the right middle   cerebral artery compatible with thromboembolus.    CT perfusion: Large penumbra in the right MCA distribution.    CTA neck: No significant vascular stenosis in the neck.    < end of copied text >    < from: IR Neuro (19 @ 14:53) >  FINDINGS:   Acute occlusion of the right middle cerebral artery. Successful   mechanical thrombectomy      Other observations: None    Following intervention:     Noncontrast CT scan with transferred to the intensive care unit.    < end of copied text >    -TTE:  < from: Transthoracic Echocardiogram (07.15.19 @ 10:00) >    Summary:   1. LV Ejection Fraction by Cerda's Method with a biplane EF of 66 %.   2. Normal left ventricular internal cavity size.   3. Normal overall lv function LV wall motion not well seen on   parasternal views. Possible infeior wall hypokinesia present.   4. No evidence of mitral valve regurgitation.   5. Structurally normal mitral valve, with normal leaflet excursion.   6. Sclerotic aortic valve with normal opening.   7. Color flow doppler and intravenous injection of agitated saline   demonstrates the presence of an intact intra atrial septum.   8. The aortic valve mean gradient is 4.9 mmHg consistent with normally   opening aortic valve.    < end of copied text >  N:    ECG:    TELEMETRY EVENTS:  sinus bradycardia in 40s during hours of sleep

## 2019-07-17 ENCOUNTER — TRANSCRIPTION ENCOUNTER (OUTPATIENT)
Age: 69
End: 2019-07-17

## 2019-07-17 VITALS
SYSTOLIC BLOOD PRESSURE: 116 MMHG | TEMPERATURE: 98 F | RESPIRATION RATE: 18 BRPM | HEART RATE: 70 BPM | DIASTOLIC BLOOD PRESSURE: 57 MMHG

## 2019-07-17 LAB
-  COAGULASE NEGATIVE STAPHYLOCOCCUS: SIGNIFICANT CHANGE UP
ALBUMIN SERPL ELPH-MCNC: 3.3 G/DL — LOW (ref 3.5–5.2)
ALP SERPL-CCNC: 85 U/L — SIGNIFICANT CHANGE UP (ref 30–115)
ALT FLD-CCNC: 15 U/L — SIGNIFICANT CHANGE UP (ref 0–41)
ANION GAP SERPL CALC-SCNC: 10 MMOL/L — SIGNIFICANT CHANGE UP (ref 7–14)
AST SERPL-CCNC: 18 U/L — SIGNIFICANT CHANGE UP (ref 0–41)
AT III ACT/NOR PPP CHRO: 107 % — SIGNIFICANT CHANGE UP (ref 85–135)
BASOPHILS # BLD AUTO: 0.03 K/UL — SIGNIFICANT CHANGE UP (ref 0–0.2)
BASOPHILS NFR BLD AUTO: 0.4 % — SIGNIFICANT CHANGE UP (ref 0–1)
BILIRUB SERPL-MCNC: 0.4 MG/DL — SIGNIFICANT CHANGE UP (ref 0.2–1.2)
BUN SERPL-MCNC: 23 MG/DL — HIGH (ref 10–20)
CALCIUM SERPL-MCNC: 9.3 MG/DL — SIGNIFICANT CHANGE UP (ref 8.5–10.1)
CHLORIDE SERPL-SCNC: 103 MMOL/L — SIGNIFICANT CHANGE UP (ref 98–110)
CO2 SERPL-SCNC: 28 MMOL/L — SIGNIFICANT CHANGE UP (ref 17–32)
CREAT SERPL-MCNC: 1 MG/DL — SIGNIFICANT CHANGE UP (ref 0.7–1.5)
EOSINOPHIL # BLD AUTO: 0.6 K/UL — SIGNIFICANT CHANGE UP (ref 0–0.7)
EOSINOPHIL NFR BLD AUTO: 8.3 % — HIGH (ref 0–8)
GLUCOSE SERPL-MCNC: 114 MG/DL — HIGH (ref 70–99)
GRAM STN FLD: SIGNIFICANT CHANGE UP
HCT VFR BLD CALC: 37 % — LOW (ref 42–52)
HCYS SERPL-MCNC: 9.1 UMOL/L — SIGNIFICANT CHANGE UP
HGB BLD-MCNC: 12.8 G/DL — LOW (ref 14–18)
IMM GRANULOCYTES NFR BLD AUTO: 0.4 % — HIGH (ref 0.1–0.3)
LYMPHOCYTES # BLD AUTO: 1.86 K/UL — SIGNIFICANT CHANGE UP (ref 1.2–3.4)
LYMPHOCYTES # BLD AUTO: 25.6 % — SIGNIFICANT CHANGE UP (ref 20.5–51.1)
MAGNESIUM SERPL-MCNC: 1.7 MG/DL — LOW (ref 1.8–2.4)
MCHC RBC-ENTMCNC: 30.9 PG — SIGNIFICANT CHANGE UP (ref 27–31)
MCHC RBC-ENTMCNC: 34.6 G/DL — SIGNIFICANT CHANGE UP (ref 32–37)
MCV RBC AUTO: 89.4 FL — SIGNIFICANT CHANGE UP (ref 80–94)
METHOD TYPE: SIGNIFICANT CHANGE UP
MONOCYTES # BLD AUTO: 0.93 K/UL — HIGH (ref 0.1–0.6)
MONOCYTES NFR BLD AUTO: 12.8 % — HIGH (ref 1.7–9.3)
NEUTROPHILS # BLD AUTO: 3.81 K/UL — SIGNIFICANT CHANGE UP (ref 1.4–6.5)
NEUTROPHILS NFR BLD AUTO: 52.5 % — SIGNIFICANT CHANGE UP (ref 42.2–75.2)
NRBC # BLD: 0 /100 WBCS — SIGNIFICANT CHANGE UP (ref 0–0)
PLATELET # BLD AUTO: 211 K/UL — SIGNIFICANT CHANGE UP (ref 130–400)
POTASSIUM SERPL-MCNC: 4.4 MMOL/L — SIGNIFICANT CHANGE UP (ref 3.5–5)
POTASSIUM SERPL-SCNC: 4.4 MMOL/L — SIGNIFICANT CHANGE UP (ref 3.5–5)
PROT SERPL-MCNC: 6.5 G/DL — SIGNIFICANT CHANGE UP (ref 6–8)
RBC # BLD: 4.14 M/UL — LOW (ref 4.7–6.1)
RBC # FLD: 12.5 % — SIGNIFICANT CHANGE UP (ref 11.5–14.5)
SODIUM SERPL-SCNC: 141 MMOL/L — SIGNIFICANT CHANGE UP (ref 135–146)
WBC # BLD: 7.26 K/UL — SIGNIFICANT CHANGE UP (ref 4.8–10.8)
WBC # FLD AUTO: 7.26 K/UL — SIGNIFICANT CHANGE UP (ref 4.8–10.8)

## 2019-07-17 PROCEDURE — 99232 SBSQ HOSP IP/OBS MODERATE 35: CPT

## 2019-07-17 PROCEDURE — 70551 MRI BRAIN STEM W/O DYE: CPT | Mod: 26

## 2019-07-17 PROCEDURE — 99239 HOSP IP/OBS DSCHRG MGMT >30: CPT

## 2019-07-17 RX ORDER — SPIRONOLACTONE 25 MG/1
1 TABLET, FILM COATED ORAL
Qty: 0 | Refills: 0 | DISCHARGE

## 2019-07-17 RX ORDER — ATORVASTATIN CALCIUM 80 MG/1
1 TABLET, FILM COATED ORAL
Qty: 30 | Refills: 0
Start: 2019-07-17 | End: 2019-08-15

## 2019-07-17 RX ORDER — BISOPROLOL FUMARATE 10 MG/1
1 TABLET, FILM COATED ORAL
Qty: 0 | Refills: 0 | DISCHARGE

## 2019-07-17 RX ORDER — MAGNESIUM SULFATE 500 MG/ML
1 VIAL (ML) INJECTION ONCE
Refills: 0 | Status: COMPLETED | OUTPATIENT
Start: 2019-07-17 | End: 2019-07-17

## 2019-07-17 RX ORDER — ASPIRIN/CALCIUM CARB/MAGNESIUM 324 MG
1 TABLET ORAL
Qty: 0 | Refills: 0 | DISCHARGE

## 2019-07-17 RX ORDER — MAGNESIUM SULFATE 500 MG/ML
2 VIAL (ML) INJECTION ONCE
Refills: 0 | Status: COMPLETED | OUTPATIENT
Start: 2019-07-17 | End: 2019-07-17

## 2019-07-17 RX ORDER — ATORVASTATIN CALCIUM 80 MG/1
1 TABLET, FILM COATED ORAL
Qty: 0 | Refills: 0 | DISCHARGE

## 2019-07-17 RX ORDER — ASPIRIN/CALCIUM CARB/MAGNESIUM 324 MG
1 TABLET ORAL
Qty: 30 | Refills: 0
Start: 2019-07-17

## 2019-07-17 RX ORDER — ATORVASTATIN CALCIUM 80 MG/1
1 TABLET, FILM COATED ORAL
Qty: 0 | Refills: 0 | DISCHARGE
Start: 2019-07-17

## 2019-07-17 RX ORDER — ASPIRIN/CALCIUM CARB/MAGNESIUM 324 MG
1 TABLET ORAL
Qty: 0 | Refills: 0 | DISCHARGE
Start: 2019-07-17

## 2019-07-17 RX ADMIN — CHLORHEXIDINE GLUCONATE 1 APPLICATION(S): 213 SOLUTION TOPICAL at 05:45

## 2019-07-17 RX ADMIN — HEPARIN SODIUM 5000 UNIT(S): 5000 INJECTION INTRAVENOUS; SUBCUTANEOUS at 05:46

## 2019-07-17 RX ADMIN — Medication 325 MILLIGRAM(S): at 14:28

## 2019-07-17 RX ADMIN — HEPARIN SODIUM 5000 UNIT(S): 5000 INJECTION INTRAVENOUS; SUBCUTANEOUS at 14:27

## 2019-07-17 RX ADMIN — PANTOPRAZOLE SODIUM 40 MILLIGRAM(S): 20 TABLET, DELAYED RELEASE ORAL at 05:46

## 2019-07-17 RX ADMIN — Medication 50 GRAM(S): at 14:27

## 2019-07-17 NOTE — SWALLOW BEDSIDE ASSESSMENT ADULT - NS SPL SWALLOW CLINIC TRIAL FT
+toleration w/o overt s/s penetration/aspiration w/ thin liquids
moderate oral dysphagia with no overt s/s of aspiration vs penetration

## 2019-07-17 NOTE — PRE-ANESTHESIA EVALUATION ADULT - NSANTHPMHFT_GEN_ALL_CORE
seen patient in procedure room, alert but not oriented, stroke code, left weakness, lungs clear, CAD, HTN, DLD, got consent from daughter
CVA 10 years ago with left sided-weakness, HTN, HLD, CAD s/p CABG 10 years ago, DVT on anticoagulation.  diagnosed with CVA on admission s/p thrombectomy

## 2019-07-17 NOTE — PROGRESS NOTE ADULT - ASSESSMENT
#Ischemic Right MCA CVA s/p Thrombectomy   - repeat 7/13 CT showed R temporal lobe infarct  - -140  - Neuro Checks q4 hr   - Stat head CT for mental status changes   - Continue Aspirin and Statin   - Magnesium 2-3   - Neuro following, continue strok eunit  - per physiatry candidate for intensive hospital based rehab  -echo showed normal EF, no PFO but neurology recommends EDEN to definitively rule out PFO given suspected embolic etiology.   -regular diet with thins passed by S+S  -MRI brain ordered  -EDEN today  -pending hypercoagulable idwv-kd-vpxvkqtec patient today for all blood work up to and including any genetic testing necessary.     #Hx of CAD s/p CABG  - Aspirin and Statin  - echo revealed Ef of 66%, Possible inferior wall hypokinesia , no PFO    #DVT in January  - only positive for Superficial Thrombophlebitis of left gastrocnemius on duplex 7/13  -per attending no need for repeat at this time.   -heparin 5k u subq q8 for prophylaxis per medical attending  -hypercoagulable work-up consent obtained today, pending results of most of it, part reordered today (nurse was in code blue on the floor unabelt o provide lab with the completed consent from from the chart at the time)    #Fever  - fever 101.8 on 7/14  - urine culture negative  -blood cultures negative to date  -currently afebrile and with no elevated WBC count  -monitor fever curve    #HTN  -holding home meds: enalapril, aldactone, bisoprolol    Diet: dysphagia 1- pureed nectar  DVT ppx- heaprin subq, Sequentials   Ambulating as tolerating, is OOB, walking  Dispo: pt/rehab/ot- per physiatry candidate for 4a   Full-code #Ischemic Right MCA CVA s/p Thrombectomy   - repeat 7/13 CT showed R temporal lobe infarct  - -140  - Neuro Checks q4 hr   - Stat head CT for mental status changes   - Continue Aspirin and Statin   - Magnesium 2-3   - Neuro following, continue strok eunit  - per physiatry candidate for intensive hospital based rehab  -echo showed normal EF, no PFO but neurology recommends EDEN to definitively rule out PFO given suspected embolic etiology.   -regular diet with thins passed by S+S  -MRI brain ordered  -EDEN today  -pending hypercoagulable lqlb-ki-kzpznzpet patient today for all blood work up to and including any genetic testing necessary.     hypomagnesemia:  -repleted today, f/u am bmp    #Hx of CAD s/p CABG  - Aspirin and Statin  - echo revealed Ef of 66%, Possible inferior wall hypokinesia , no PFO    #DVT in January  - only positive for Superficial Thrombophlebitis of left gastrocnemius on duplex 7/13  -per attending no need for repeat at this time.   -heparin 5k u subq q8 for prophylaxis per medical attending  -hypercoagulable work-up consent obtained today, pending results of most of it, part reordered today (nurse was in code blue on the floor unabelt o provide lab with the completed consent from from the chart at the time)    #Fever  - fever 101.8 on 7/14  - urine culture negative  -blood cultures negative to date  -currently afebrile and with no elevated WBC count  -monitor fever curve    #HTN  -holding home meds: enalapril, aldactone, bisoprolol    Diet: dysphagia 1- pureed nectar  DVT ppx- heaprin subq, Sequentials   Ambulating as tolerating, is OOB, walking  Dispo: pt/rehab/ot- per physiatry candidate for 4a   Full-code #Ischemic Right MCA CVA s/p Thrombectomy   - repeat 7/13 CT showed R temporal lobe infarct  - -140  - Neuro Checks q4 hr   - Stat head CT for mental status changes   - Continue Aspirin and Statin   - Magnesium 2-3   - Neuro following, continue strok eunit  - per physiatry candidate for intensive hospital based rehab  -echo showed normal EF, no PFO but neurology recommends EDEN to definitively rule out PFO given suspected embolic etiology.   -regular diet with thins passed by S+S  -MRI brain ordered  -EDEN today  -pending hypercoagulable kjgf-zb-whdtkvagt patient today for all blood work up to and including any genetic testing necessary.     hypomagnesemia:  -repleted today, f/u am bmp    #Hx of CAD s/p CABG  - Aspirin and Statin  - echo revealed Ef of 66%, Possible inferior wall hypokinesia , no PFO    #DVT in January  - only positive for Superficial Thrombophlebitis of left gastrocnemius on duplex 7/13  -per attending no need for repeat at this time.   -heparin 5k u subq q8 for prophylaxis per medical attending  -hypercoagulable work-up consent obtained today, pending results of most of it, part reordered today (nurse was in code blue on the floor unabelt o provide lab with the completed consent from from the chart at the time)    #Fever  - fever 101.8 on 7/14  - urine culture negative  -blood cultures negative to date  -currently afebrile and with no elevated WBC count  -monitor fever curve    #HTN  -holding home meds: enalapril, aldactone, bisoprolol    Diet: dash tlc  DVT ppx- heaprin subq, Sequentials   Ambulating as tolerating, is OOB, walking  Dispo: pt/rehab/ot- per physiatry candidate for 4a   Full-code

## 2019-07-17 NOTE — PROGRESS NOTE ADULT - SUBJECTIVE AND OBJECTIVE BOX
Neurology Follow up note      Name  ARELI PITTMAN    HPI:  Mr. Pittman is a 67 yo male patient, with a PMH of CAD s/p stenting and CABG 10 years ago, history of CVA more than 10 years ago with minimal residual left upper extremity weakness, lower extremities DVT in January 2019 when he was in [facility] treated for a short period of time with s/c injections and oral anticoagulation, on aspirin, functional at baseline, was brought in by his family due to unwitnessed fall this AM in the bathroom. LKW is 10:30 PM yesterday. Initially pt presented with right gaze and left sided weakness which improved upon presentation.    In ED, code stroke was called, he was evaluated by the neurology service, NIHSS of 4. No TPA as LKW >4.5 hours. CTP (+) distal M1 occlusion w large penumbra, code NI activated. Post CTA/CTP his NIHSS was 7. Delay in neurointervention inintially as family was not agreeing to intervention. Patient then transferred to  IR suite for mechanical thrombectomy. (12 Jul 2019 13:00) LKW is 10:30 PM yesterday. Initially pt presented with right gaze and left sided weakness which improved upon presentation.        Interval History - No issues overnight. Pt did not get EDEN.          Vital Signs Last 24 Hrs  T(C): 35.9 (17 Jul 2019 05:28), Max: 36.2 (16 Jul 2019 21:35)  T(F): 96.6 (17 Jul 2019 05:28), Max: 97.2 (16 Jul 2019 21:35)  HR: 54 (17 Jul 2019 05:28) (54 - 63)  BP: 119/67 (17 Jul 2019 05:28) (119/67 - 134/72)  BP(mean): --  RR: 18 (17 Jul 2019 05:28) (18 - 18)  SpO2: --          Neurological Exam:   Mental status: Awake, alert and oriented  FSC PERRLA   SEGURA X 4   Sensory; grossly intact                    Medications  aspirin 325 milliGRAM(s) Oral daily  atorvastatin 40 milliGRAM(s) Oral at bedtime  chlorhexidine 4% Liquid 1 Application(s) Topical <User Schedule>  heparin  Injectable 5000 Unit(s) SubCutaneous every 8 hours  pantoprazole    Tablet 40 milliGRAM(s) Oral before breakfast      Lab      Radiology

## 2019-07-17 NOTE — SWALLOW BEDSIDE ASSESSMENT ADULT - SLP GENERAL OBSERVATIONS
pt awake alert however very restless and appears impulsive. pt very loquacious and talking over  when using phone
Pt received sitting upright in bed, alert and oriented w/ no c/o pain. +room air. Pt s/p EDEN placement.
+dysarthric, +nc

## 2019-07-17 NOTE — DISCHARGE NOTE NURSING/CASE MANAGEMENT/SOCIAL WORK - NSDCDPATPORTLINK_GEN_ALL_CORE
You can access the [facility] Patient Portal, offered by [facility], by registering with the following website: [email or web address]

## 2019-07-17 NOTE — PROGRESS NOTE ADULT - SUBJECTIVE AND OBJECTIVE BOX
Pt seen and examined independently. No new complaints. Feeling well. Ambulating on his own without devices or support. Cleared by PT. EDEN negative for PFO or thrombus. D/w neuro - ok to d/c home on ASA 325mg with outpt event monitor/loop recorder. Daughter wants to take pt home today as she is unable to pick him up tomorrow.    Gen:NAD, AAOx2+, slightly forgetful  CV: S1 S2  Resp: Decreased BS b/l  GI: NT/ND/S +BS  MS: neg c/c/e  Neuro: nonfocal except for mild left facial droop    Discharge instructions discussed with daughter over the phone and she knows when to seek immediate medical attention.  Patient has proper follow up.  All results discussed and daughter aware they require further work up.  Stressed importance of proper follow up i.e event monitor (has a cardiologist as per daughter).  Medications prescribed and changes discussed.  All questions and concerns from daughter addressed. Understanding of instructions verbalized.    Time spent in completing discharge process and coordinating care 45 minutes. Pt seen and examined independently. No new complaints. Feeling well. Ambulating on his own without devices or support. Cleared by PT. EDEN negative for PFO or thrombus. D/w neuro - ok to d/c home on ASA 325mg with outpt event monitor/loop recorder. Daughter wants to take pt home today as she is unable to pick him up tomorrow. No events on tele.    Gen:NAD, AAOx2+, slightly forgetful  CV: S1 S2  Resp: Decreased BS b/l  GI: NT/ND/S +BS  MS: neg c/c/e  Neuro: nonfocal except for mild left facial droop    Discharge instructions discussed with daughter over the phone and she knows when to seek immediate medical attention.  Patient has proper follow up.  All results discussed and daughter aware they require further work up.  Stressed importance of proper follow up i.e event monitor (has a cardiologist as per daughter).  Medications prescribed and changes discussed.  All questions and concerns from daughter addressed. Understanding of instructions verbalized.    Time spent in completing discharge process and coordinating care 45 minutes.

## 2019-07-17 NOTE — PROGRESS NOTE ADULT - REASON FOR ADMISSION
Fall + facial droop

## 2019-07-17 NOTE — SWALLOW BEDSIDE ASSESSMENT ADULT - SWALLOW EVAL: RECOMMENDED FEEDING/EATING TECHNIQUES
oral hygiene/position upright (90 degrees)/small sips/bites/alternate food with liquid
maintain upright posture during/after eating for 30 mins/oral hygiene

## 2019-07-17 NOTE — DISCHARGE NOTE PROVIDER - CARE PROVIDERS DIRECT ADDRESSES
,DirectAddress_Unknown,[email or web address],DirectAddress_Unknown

## 2019-07-17 NOTE — DISCHARGE NOTE NURSING/CASE MANAGEMENT/SOCIAL WORK - NSDCPEPTSTRK_GEN_ALL_CORE
Risk factors for stroke/Stroke support groups for patients, families, and friends/Stroke warning signs and symptoms/Signs and symptoms of stroke/Need for follow up after discharge/Call 911 for stroke/Prescribed medications/Stroke education booklet

## 2019-07-17 NOTE — DISCHARGE NOTE PROVIDER - HOSPITAL COURSE
Mr. Lomeli is a 69 yo male patient, with a PMH of CAD s/p stenting and CABG 10 years ago, history of CVA more than 10 years ago with minimal residual left upper extremity weakness, lower extremities DVT in January 2019 when he was in [facility] treated for a short period of time with s/c injections and oral anticoagulation, on aspirin, functional at baseline, was brought in by his family due to unwitnessed fall this AM in the bathroom. LKW is 10:30 PM yesterday. Initially pt presented with right gaze and left sided weakness which improved upon presentation.        In ED, code stroke was called, he was evaluated by the neurology service, NIHSS of 4. No TPA as LKW >4.5 hours. CTP (+) distal M1 occlusion w large penumbra, code NI activated. Post CTA/CTP his NIHSS was 7. Delay in neurointervention inintially as family was not agreeing to intervention. Patient then transferred to  IR suite for mechanical thrombectomy.        Patient underwent thrombectomy and tolerated the procd Mr. Lomeli is a 67 yo male patient, with a PMH of CAD s/p stenting and CABG 10 years ago, history of CVA more than 10 years ago with minimal residual left upper extremity weakness, lower extremities DVT in January 2019 when he was in [facility] treated for a short period of time with s/c injections and oral anticoagulation, on aspirin, functional at baseline, was brought in by his family due to unwitnessed fall this AM in the bathroom. LKW is 10:30 PM yesterday. Initially pt presented with right gaze and left sided weakness which improved upon presentation.        In ED, code stroke was called, he was evaluated by the neurology service, NIHSS of 4. No TPA as LKW >4.5 hours. CTP (+) distal M1 occlusion w large penumbra, code NI activated. Post CTA/CTP his NIHSS was 7. Delay in neurointervention inintially as family was not agreeing to intervention. Patient then transferred to  IR suite for mechanical thrombectomy.        Patient underwent thrombectomy and tolerated the procedure well. Patient's deficit is back to his baseline and he is ambulatory. Patient's MRI shows scattered acute infarcts within the right MCA territories involving the temporal lobe, insular cortex and frontal and parietal lobes. ECHO showed no PFO. EDEN was done and it confirmed no cardiac thrombus nor PFO, but there is a simple atheroma at descending aorta. Hypercoagulable work up was done and result is pending. Patient was able to work with physical therapy and he did well. Patient wished to be discharged home. Neurology cleared the patient for d/c. Patient will need to follow up with Neurology and his PCP outpatient closely.

## 2019-07-17 NOTE — DISCHARGE NOTE PROVIDER - PROVIDER TOKENS
PROVIDER:[TOKEN:[[id number]],FOLLOWUP:[1 week]],PROVIDER:[TOKEN:[[id number]],FOLLOWUP:[1 week]],FREE:[LAST:[Primary Medical Doctor],PHONE:[(   )    -],FAX:[(   )    -],FOLLOWUP:[1 week]]

## 2019-07-17 NOTE — SWALLOW BEDSIDE ASSESSMENT ADULT - ASR SWALLOW ASPIRATION MONITOR
oral hygiene/position upright (90Y)
oral hygiene/position upright (90Y)/fever/pneumonia/upper respiratory infection/cough/gurgly voice/throat clearing

## 2019-07-17 NOTE — PROGRESS NOTE ADULT - ASSESSMENT
IMP:Ischemic Right MCA CVA s/p Thrombectomy  with repeat 7/13 CT showed R temporal lobe infarct          PLAN: NPO for EDEN today  neuro checks q 4H  pT/OT   Continue current care

## 2019-07-17 NOTE — DISCHARGE NOTE PROVIDER - NSDCQMSTROKERISK_NEU_ALL_CORE
High cholesterol/History of a stroke or TIA High blood pressure/History of a stroke or TIA/High cholesterol

## 2019-07-17 NOTE — SWALLOW BEDSIDE ASSESSMENT ADULT - SLP PERTINENT HISTORY OF CURRENT PROBLEM
PMHx: CAD s/p stenting and CABG 10 years ago, history of CVA more than 10 years ago came in R MCA, manual thrombectomy. no TPA administered.

## 2019-07-17 NOTE — PROGRESS NOTE ADULT - SUBJECTIVE AND OBJECTIVE BOX
SUBJECTIVE:  there were no acute events overnight. the aptient will go for EDEN today. then after MRI we will discuss discharge, likely tomorrow.     PAST MEDICAL & SURGICAL HISTORY  DVT, lower extremity: January 2019, not currently on anticoagulation  Stroke: 10 years ago as per MD HALE (hyperlipidemia)  CAD (coronary artery disease): s/p stenting and CABG 10 years ago  S/P CABG (coronary artery bypass graft)    SOCIAL HISTORY:  Negative for smoking/alcohol/drug use.     ALLERGIES:  No Known Allergies    MEDICATIONS:  STANDING MEDICATIONS  aspirin 325 milliGRAM(s) Oral daily  atorvastatin 40 milliGRAM(s) Oral at bedtime  chlorhexidine 4% Liquid 1 Application(s) Topical <User Schedule>  heparin  Injectable 5000 Unit(s) SubCutaneous every 8 hours  magnesium sulfate  IVPB 2 Gram(s) IV Intermittent once  pantoprazole    Tablet 40 milliGRAM(s) Oral before breakfast    PRN MEDICATIONS    VITALS:   T(F): 97.7  HR: 51  BP: 113/66  RR: 18  SpO2: 95%    LABS:                        12.8   7.26  )-----------( 211      ( 17 Jul 2019 06:19 )             37.0     07-17    141  |  103  |  23<H>  ----------------------------<  114<H>  4.4   |  28  |  1.0    Ca    9.3      17 Jul 2019 06:19  Mg     1.7     07-17    TPro  6.5  /  Alb  3.3<L>  /  TBili  0.4  /  DBili  x   /  AST  18  /  ALT  15  /  AlkPhos  85  07-17    PT/INR - ( 16 Jul 2019 18:00 )   PT: 12.20 sec;   INR: 1.06 ratio         PTT - ( 16 Jul 2019 18:00 )  PTT:31.8 sec          Culture - Blood (collected 15 Jul 2019 16:58)  Source: .Blood None  Preliminary Report (17 Jul 2019 03:00):    No growth to date.    Culture - Blood (collected 15 Jul 2019 11:13)  Source: .Blood None  Preliminary Report (16 Jul 2019 18:01):    No growth to date.    Culture - Urine (collected 15 Jul 2019 10:42)  Source: .Urine Clean Catch (Midstream)  Final Report (16 Jul 2019 10:34):    <10,000 CFU/mL Normal Urogenital Greer    RADIOLOGY:    PHYSICAL EXAM:  patient was taken to cath lab for EDEN, will return for exam if there is an opportunity,.

## 2019-07-17 NOTE — DISCHARGE NOTE PROVIDER - CARE PROVIDER_API CALL
Silvio Heath)  Cardiovascular Disease; Interventional Cardiology  [address], [address]  [place] [address]  Phone: [phone]  Fax: [phone]  Follow Up Time: 1 week    Wyatt Ortega)  EEGEpilepsy; Neurology  [address] [address] [facility], [address]  [place] [address]  Phone: [phone]  Fax: [phone]  Follow Up Time: 1 week    Primary Medical Doctor,   Phone: (   )    -  Fax: (   )    -  Follow Up Time: 1 week

## 2019-07-17 NOTE — CHART NOTE - NSCHARTNOTEFT_GEN_A_CORE
EDEN Procedure Note:     After risks, benefits and alternatives of the procedure were explained, consent was signed and placed in the medical record.  Procedural timeout was taken.  Sedation was administered by anesthesia.  EDEN probe inserted without complication and EDEN performed.  Patient tolerated the procedure well without complication.  Post-procedure vital signs were stable.    TTE findings:  MV normal , no MR , redundant MV anterior leaflet , no vegetation  AV : trifleaflet ,sclerotic,  no vegetation  TV mild TR  PV nl  LA normal , no AFSHIN thrombus  IAS , intact , no shunt seen on doppler or agitated saline  LV normal  aorta simple atheroma in descending aorta    conclusion:  no EDEN findings suggestive of cardiac embolic source.  follow up with neurology

## 2019-07-17 NOTE — PROGRESS NOTE ADULT - ATTENDING COMMENTS
patient seen and examined  he is doing quite well  agree with NP note as above except as noted  NIHSS 2 FOR MILD DYSARTHRIA AND L FACIAL  F/U CTH REPORT. NO GROSS CHANGE FORM PREVIOUS  CONT ASA AND STATIN  REMAINDER OF RECS AS ABOVE  CAN DOWNGRADE TO [place] TODAY  PLEASE CALL WITH ANY QUESTIONS
Patient seen and examined and agree with above except as noted.  Case discussed with medical team on rounds.  Patient improving has slight facial and ?slurred speech otherwise strength is 5/5 and vision and sensation normal.  NIHSS 1-2    Plan as above
Patient seen and examined and agree with above except as noted.  Patient has no new complaints and is doing well    Plan as above

## 2019-07-17 NOTE — DISCHARGE NOTE PROVIDER - NSDCCPCAREPLAN_GEN_ALL_CORE_FT
PRINCIPAL DISCHARGE DIAGNOSIS  Diagnosis: Acute cerebrovascular accident (CVA)  Assessment and Plan of Treatment: PRINCIPAL DISCHARGE DIAGNOSIS  Diagnosis: Acute cerebrovascular accident (CVA)  Assessment and Plan of Treatment: start taking full dose of Aspirin (325mg) and Lipitor dose increased to 40mg daily. Please follow up with neurologist Dr. Guerrero within 2wks and your cardiologist or Dr. Hobbs in 1wk to set up cardiac event monitor to rule out arrhythmia as a cause of your stroke. Follow up with neurologist the results of hypercoaguable workup.      SECONDARY DISCHARGE DIAGNOSES  Diagnosis: Sinus bradycardia  Assessment and Plan of Treatment: recommend to discontinue your bisoprolol for now. Discuss this with your cardiologist.    Diagnosis: Superficial thrombophlebitis of left leg  Assessment and Plan of Treatment: repeat ultrasound in 1-2wks for progression

## 2019-07-17 NOTE — SWALLOW BEDSIDE ASSESSMENT ADULT - SWALLOW EVAL: RECOMMENDED DIET
regular solids w/ thin liquids
regular with thins
Dysphagia diet I puree consistency, Nectar-thickened liquids

## 2019-07-17 NOTE — PROGRESS NOTE ADULT - PROVIDER SPECIALTY LIST ADULT
Critical Care
Hospitalist
Internal Medicine
Intervent Radiology
Neurology
Critical Care
Internal Medicine

## 2019-07-17 NOTE — SWALLOW BEDSIDE ASSESSMENT ADULT - SWALLOW EVAL: DIAGNOSIS
+toleration for thin liquids and regular solids w/o overt s/s penetration/aspiration.
mild oral dysphagia for regular no overt symptoms of penetration/aspiration
moderate oral dysphagia for Dysphagia diet I puree consistency, mild oral dysphagia for Nectar-thickened liquids . +cough post thins

## 2019-07-18 LAB
B2 GLYCOPROT1 AB SER QL: POSITIVE
CARDIOLIPIN AB SER-ACNC: NEGATIVE — SIGNIFICANT CHANGE UP
CULTURE RESULTS: SIGNIFICANT CHANGE UP
ORGANISM # SPEC MICROSCOPIC CNT: SIGNIFICANT CHANGE UP
ORGANISM # SPEC MICROSCOPIC CNT: SIGNIFICANT CHANGE UP
SPECIMEN SOURCE: SIGNIFICANT CHANGE UP

## 2019-07-19 PROBLEM — I25.10 ATHEROSCLEROTIC HEART DISEASE OF NATIVE CORONARY ARTERY WITHOUT ANGINA PECTORIS: Chronic | Status: ACTIVE | Noted: 2019-07-12

## 2019-07-19 PROBLEM — E78.5 HYPERLIPIDEMIA, UNSPECIFIED: Chronic | Status: ACTIVE | Noted: 2019-07-12

## 2019-07-19 LAB
APCR PPP: 3.24 RATIO — SIGNIFICANT CHANGE UP
MTHFR GENE INTERPRETATION: SIGNIFICANT CHANGE UP
PROT C ACT/NOR PPP: 97 % — SIGNIFICANT CHANGE UP (ref 65–129)
PTR INTERPRETATION: SIGNIFICANT CHANGE UP

## 2019-07-20 LAB
CULTURE RESULTS: SIGNIFICANT CHANGE UP
PROT S FREE PPP-ACNC: 53 % NORMAL — LOW (ref 70–150)
SPECIMEN SOURCE: SIGNIFICANT CHANGE UP

## 2019-07-21 LAB
B2 GLYCOPROT1 IGA SER QL: 84.4 SAU — HIGH
B2 GLYCOPROT1 IGG SER-ACNC: <5 SGU — SIGNIFICANT CHANGE UP
B2 GLYCOPROT1 IGM SER-ACNC: <5 SMU — SIGNIFICANT CHANGE UP

## 2019-07-22 LAB — DNA PLOIDY SPEC FC-IMP: SIGNIFICANT CHANGE UP

## 2019-07-22 NOTE — CHART NOTE - NSCHARTNOTEFT_GEN_A_CORE
After pt's discharge, spoke to pt's daughter regarding need to repeat BCx as likely + BCx from inpt stay was contaminant, as well as to get hematology outp f/u for the findings on hypercoaguable workup. Daughter verbalized understanding of instructions. Also, spoke to pt's PMD Dr. Mahoney, and signout given re: cardiac event monitor, LE doppler, BCx, heme f/u, etc. Pt has an appt today at 11am.

## 2019-07-25 DIAGNOSIS — I10 ESSENTIAL (PRIMARY) HYPERTENSION: ICD-10-CM

## 2019-07-25 DIAGNOSIS — G81.94 HEMIPLEGIA, UNSPECIFIED AFFECTING LEFT NONDOMINANT SIDE: ICD-10-CM

## 2019-07-25 DIAGNOSIS — R00.1 BRADYCARDIA, UNSPECIFIED: ICD-10-CM

## 2019-07-25 DIAGNOSIS — I25.10 ATHEROSCLEROTIC HEART DISEASE OF NATIVE CORONARY ARTERY WITHOUT ANGINA PECTORIS: ICD-10-CM

## 2019-07-25 DIAGNOSIS — R29.810 FACIAL WEAKNESS: ICD-10-CM

## 2019-07-25 DIAGNOSIS — E11.9 TYPE 2 DIABETES MELLITUS WITHOUT COMPLICATIONS: ICD-10-CM

## 2019-07-25 DIAGNOSIS — I63.411 CEREBRAL INFARCTION DUE TO EMBOLISM OF RIGHT MIDDLE CEREBRAL ARTERY: ICD-10-CM

## 2019-07-25 DIAGNOSIS — R50.9 FEVER, UNSPECIFIED: ICD-10-CM

## 2019-07-25 DIAGNOSIS — I80.02 PHLEBITIS AND THROMBOPHLEBITIS OF SUPERFICIAL VESSELS OF LEFT LOWER EXTREMITY: ICD-10-CM

## 2019-07-25 DIAGNOSIS — R29.704 NIHSS SCORE 4: ICD-10-CM

## 2019-07-25 DIAGNOSIS — E83.42 HYPOMAGNESEMIA: ICD-10-CM

## 2019-07-25 DIAGNOSIS — E78.5 HYPERLIPIDEMIA, UNSPECIFIED: ICD-10-CM

## 2019-07-25 DIAGNOSIS — R47.1 DYSARTHRIA AND ANARTHRIA: ICD-10-CM

## 2019-07-25 DIAGNOSIS — I63.9 CEREBRAL INFARCTION, UNSPECIFIED: ICD-10-CM

## 2019-07-25 DIAGNOSIS — Z72.89 OTHER PROBLEMS RELATED TO LIFESTYLE: ICD-10-CM

## 2019-07-25 DIAGNOSIS — Z95.1 PRESENCE OF AORTOCORONARY BYPASS GRAFT: ICD-10-CM

## 2019-08-01 ENCOUNTER — OUTPATIENT (OUTPATIENT)
Dept: OUTPATIENT SERVICES | Facility: HOSPITAL | Age: 69
LOS: 1 days | End: 2019-08-01
Payer: MEDICAID

## 2019-08-01 DIAGNOSIS — Z95.1 PRESENCE OF AORTOCORONARY BYPASS GRAFT: Chronic | ICD-10-CM

## 2019-08-01 PROCEDURE — G9001: CPT

## 2019-08-14 ENCOUNTER — OUTPATIENT (OUTPATIENT)
Dept: OUTPATIENT SERVICES | Facility: HOSPITAL | Age: 69
LOS: 1 days | Discharge: HOME | End: 2019-08-14

## 2019-08-14 ENCOUNTER — LABORATORY RESULT (OUTPATIENT)
Age: 69
End: 2019-08-14

## 2019-08-14 ENCOUNTER — APPOINTMENT (OUTPATIENT)
Dept: HEMATOLOGY ONCOLOGY | Facility: CLINIC | Age: 69
End: 2019-08-14
Payer: MEDICAID

## 2019-08-14 VITALS
SYSTOLIC BLOOD PRESSURE: 148 MMHG | BODY MASS INDEX: 28.28 KG/M2 | TEMPERATURE: 98 F | WEIGHT: 176 LBS | DIASTOLIC BLOOD PRESSURE: 70 MMHG | RESPIRATION RATE: 17 BRPM | HEART RATE: 55 BPM | HEIGHT: 66 IN

## 2019-08-14 DIAGNOSIS — Z95.1 PRESENCE OF AORTOCORONARY BYPASS GRAFT: Chronic | ICD-10-CM

## 2019-08-14 DIAGNOSIS — Z71.89 OTHER SPECIFIED COUNSELING: ICD-10-CM

## 2019-08-14 DIAGNOSIS — Z86.718 PERSONAL HISTORY OF OTHER VENOUS THROMBOSIS AND EMBOLISM: ICD-10-CM

## 2019-08-14 PROCEDURE — 99205 OFFICE O/P NEW HI 60 MIN: CPT

## 2019-08-14 NOTE — PHYSICAL EXAM
[Normal] : affect appropriate [Restricted in physically strenuous activity but ambulatory and able to carry out work of a light or sedentary nature] : Status 1- Restricted in physically strenuous activity but ambulatory and able to carry out work of a light or sedentary nature, e.g., light house work, office work [de-identified] : left sided weakness s/p CVA 10 years ago

## 2019-08-14 NOTE — CONSULT LETTER
[Dear  ___] : Dear  [unfilled], [Consult Letter:] : I had the pleasure of evaluating your patient, [unfilled]. [Please see my note below.] : Please see my note below. [Consult Closing:] : Thank you very much for allowing me to participate in the care of this patient.  If you have any questions, please do not hesitate to contact me. [Sincerely,] : Sincerely, [FreeTextEntry3] : Blaise Hahn DO\par Attending Physician,\par Hematology/ Medical Oncology\par [phone] office\par \par

## 2019-08-14 NOTE — REASON FOR VISIT
[Initial Consultation] : an initial consultation for [Thrombocytosis] : thrombocytosis [Blood Count Assessment] : blood count assessment [Spouse] : spouse [Family Member] : family member

## 2019-08-14 NOTE — HISTORY OF PRESENT ILLNESS
[de-identified] : 69 year old male here today for evaluation of hypercoagulability.  Noted that He had a stroke about 11 years ago, followed by 2 heart attacks around 10 years ago which he underwent CABG around 2009 (all performed in [facility]).  They report he also suffered a lower extremity DVT in January of 2019 after traveling to [facility] (10 hr flight). In July 2019, he suffered another stroke. At that time, hypercoagulability tests showed decreased protein S assay. \par \par They deny any known history of hypertension or hypercholesterolemia prior to 10 years ago.  He was brought to ER by family in July 2019 after unwitnessed fall this AM in the bathroom.  They state he had unilateral visual disturbance in the left eye and issues with strength and brought him to ED.  In ED, code stroke was called, he was evaluated by the neurology service, NIHSS of 4. No TPA as LKW >4.5 hours.  Initially pt presented with right gaze and left sided weakness which improved upon presentation.  He underwent mechanical thrombectomy as inpatient.  Patient's MRI shows scattered acute infarcts within the right MCA territories involving the temporal lobe, insular cortex and frontal and parietal lobes. ECHO showed no PFO. EDEN was done and it confirmed no cardiac thrombus nor PFO, but there is a simple atheroma at descending aorta. \par \par RADIOLOGIC WORKUP\par MR Brain (7/17/19) IMPRESSION:1.  Scattered acute infarcts within the right MCA territory involving the temporal lobe, insular cortex and frontal and parietal lobes. No significant mass effect or acute intracranial hemorrhage.2.  Mild chronic microvascular changes.\par US LE (7/16/19) Impression:No evidence of deep venous thrombosis in the bilateral lower extremities. Superficial thrombophlebitis noted in the left great saphenous vein.\par \par LAB WORKUP\par (7/16/19) Hgb 12.5, WBC 6.62, , BUN 24, Cardiolipin ab (-), B2 glycoprotein (+), Protein S activity 53,  Factor 5 leiden nL, Prothrombin nL

## 2019-08-14 NOTE — ASSESSMENT
[FreeTextEntry1] : 68 yo man with episodes of arterial and venous thromboembolism (CVA 12 years ago, MI X2 10 years ago, DVT 2019; CVA 2019). \par - explained that Protein S can be decreased as a result of acute event and its low level can not be properly interpreted; instead protein s, protein c, antithrombin should be checked at least 8 weeks after the acute event. \par - LUPA, Cardiolipin ab, B2 glycoprotein testing today\par - c/w Aspirin (325mg) daily\par - follow up with neurologist Dr. Guerrero and cardiologist or Dr. Hobbs as scheduled for cardiac event monitor to rule out arrhythmia as a cause for stroke\par \par RTC  9.25.19; will check labs beofre that;  CBC, Protein C assay, Protein S assay, Antithrombin, Cardiolipin ab, LUPA, Prothrombin gene, Factor 5 Leiden, MTHFR and Factor 8 to be drawn days prior

## 2019-08-16 ENCOUNTER — APPOINTMENT (OUTPATIENT)
Dept: NEUROLOGY | Facility: CLINIC | Age: 69
End: 2019-08-16
Payer: MEDICAID

## 2019-08-16 VITALS
DIASTOLIC BLOOD PRESSURE: 75 MMHG | BODY MASS INDEX: 28.45 KG/M2 | SYSTOLIC BLOOD PRESSURE: 126 MMHG | HEIGHT: 66 IN | WEIGHT: 177 LBS | HEART RATE: 46 BPM

## 2019-08-16 DIAGNOSIS — Z95.1 PRESENCE OF AORTOCORONARY BYPASS GRAFT: ICD-10-CM

## 2019-08-16 LAB
B2 GLYCOPROT1 IGA SERPL IA-ACNC: 89.2 SAU
B2 GLYCOPROT1 IGG SER-ACNC: <5 SGU
B2 GLYCOPROT1 IGM SER-ACNC: 5 SMU
CONFIRM: NORMAL
DRVVT IMM 1:2 NP PPP: NORMAL
DRVVT SCREEN TO CONFIRM RATIO: 1.1 RATIO
SCREEN DRVVT: NORMAL
SILICA CLOTTING TIME INTERPRETATION: NORMAL
SILICA CLOTTING TIME S/C: 0.96 RATIO

## 2019-08-16 PROCEDURE — 99214 OFFICE O/P EST MOD 30 MIN: CPT

## 2019-08-16 NOTE — DISCUSSION/SUMMARY
[FreeTextEntry1] : Mr. Lomeli is a 70yo man with recent large vessel stroke s/p mechanical thrombectomy.  He was recently prior to thestroke being treated for DVT in [facility].  Unclear if he has a hypercoagulable risk factor as some labs are being repeated which may have been abnormal due to the acute event.\par 1. f/u with repeat hematological blood work\par 2. Continue current medications\par 3. 30 day event monitor\par 4. f/u in 6 months

## 2019-08-16 NOTE — PHYSICAL EXAM
[FreeTextEntry1] :  [id number]\par a+Ox3 language and attention normal\par CN 2-12 normal with slight left facial\par power 5/5 in all extremities\par Temp, Vib, PP symmetric\par FTN NL\par Gait normal\par DTR 2+ in UE and absent in LE\par \par NIHSS 0\par mrankin 0\par

## 2019-08-16 NOTE — HISTORY OF PRESENT ILLNESS
[FreeTextEntry1] : Mr. Lomeli is a 70yo man here for follow up of stroke. Hospital visit note below:\par \par Mr. Lomeli is a 69 yo male patient, with a PMH of CAD s/p stenting and CABG 10 years ago, history of CVA more than 10 years ago with minimal residual left upper extremity weakness, lower extremities DVT in January 2019 when he was in [facility] treated for a short period of time with s/c injections and oral anticoagulation, on aspirin, functional at baseline, was brought in by his family due to unwitnessed fall this AM in the bathroom. LKW is 10:30 PM yesterday. Initially pt presented with right gaze and left sided weakness which improved upon presentation.\par \par In ED, code stroke was called, he was evaluated by the neurology service, NIHSS of 4. No TPA as LKW >4.5 hours. CTP (+) distal M1 occlusion w large penumbra, code NI activated. Post CTA/CTP his NIHSS was 7. Delay in neurointervention inintially as family was not agreeing to intervention. Patient then transferred to  IR suite for mechanical thrombectomy. (12 Jul 2019 13:00) LKW is 10:30 PM yesterday. Initially pt presented with right gaze and left sided weakness which improved upon presentation.\par \par He underwent mechanical thrombectomy and improved significantly post procedure.  He has been home now and is doing well.  Strength has improved however only complaint appears to be related to memory.  He forgets names and gets angry easily according to his wife.  He denies depression but has lost interest in things he used to do and doesn’t want to go out and socialize anymore\par \par \par

## 2019-08-16 NOTE — REVIEW OF SYSTEMS
[Decr. Concentrating Ability] : decreased concentrating ability [Changed Thought Patterns] : changed thought patterns [Negative] : Endocrine

## 2019-08-20 DIAGNOSIS — D68.9 COAGULATION DEFECT, UNSPECIFIED: ICD-10-CM

## 2019-08-20 LAB — DEPRECATED CARDIOLIPIN IGA SER: 13 APL

## 2019-09-01 ENCOUNTER — OUTPATIENT (OUTPATIENT)
Dept: OUTPATIENT SERVICES | Facility: HOSPITAL | Age: 69
LOS: 1 days | End: 2019-09-01

## 2019-09-01 DIAGNOSIS — Z95.1 PRESENCE OF AORTOCORONARY BYPASS GRAFT: Chronic | ICD-10-CM

## 2019-09-16 ENCOUNTER — APPOINTMENT (OUTPATIENT)
Dept: HEMATOLOGY ONCOLOGY | Facility: CLINIC | Age: 69
End: 2019-09-16

## 2019-09-18 ENCOUNTER — APPOINTMENT (OUTPATIENT)
Dept: HEMATOLOGY ONCOLOGY | Facility: CLINIC | Age: 69
End: 2019-09-18

## 2019-09-19 DIAGNOSIS — Z71.89 OTHER SPECIFIED COUNSELING: ICD-10-CM

## 2020-02-12 ENCOUNTER — APPOINTMENT (OUTPATIENT)
Dept: CARDIOLOGY | Facility: CLINIC | Age: 70
End: 2020-02-12

## 2020-03-03 ENCOUNTER — APPOINTMENT (OUTPATIENT)
Dept: NEUROLOGY | Facility: CLINIC | Age: 70
End: 2020-03-03
Payer: MEDICAID

## 2020-03-03 VITALS
SYSTOLIC BLOOD PRESSURE: 134 MMHG | HEART RATE: 57 BPM | WEIGHT: 178 LBS | DIASTOLIC BLOOD PRESSURE: 82 MMHG | OXYGEN SATURATION: 99 % | HEIGHT: 66 IN | BODY MASS INDEX: 28.61 KG/M2

## 2020-03-03 PROCEDURE — 99214 OFFICE O/P EST MOD 30 MIN: CPT

## 2020-03-03 NOTE — REVIEW OF SYSTEMS
[Feeling Poorly] : feeling poorly [Depression] : depression [Feeling Tired] : feeling tired [Memory Lapses or Loss] : memory loss [Decr. Concentrating Ability] : decreased concentrating ability [Negative] : Genitourinary

## 2020-03-03 NOTE — PHYSICAL EXAM
[FreeTextEntry1] :  [id number]\par a+Ox3 language and attention normal\par CN 2-12 normal with slight left facial\par power 5/5 in all extremities\par Temp, Vib, PP symmetric\par FTN NL\par Gait normal\par DTR 2+ in UE and absent in LE\par \par NIHSS 0\par mrankin 0

## 2020-03-03 NOTE — DISCUSSION/SUMMARY
[FreeTextEntry1] : Mr. Lomeli is a 68yo man with right MCA stroke July 2019 here for follow up.  He is getting behavioral and fluctuating focal neurological issues when he is angry.  His wife also notes twitching to his face occasionally.  DDX: seizure He also has severe neck pain on daily basis\par 1. MRI cervical spine\par 2. Routine EEG\par 3. Stroke education provided\par 4. Continue asa and statin\par 5. Continue exercise\par 6. Started celexa 10mg QD\par 7. f/u in 6 months

## 2020-03-03 NOTE — HISTORY OF PRESENT ILLNESS
[FreeTextEntry1] :  [id number]\par Mr. Lomeli is a 70yo man here for follow up of stroke in the right MCA region s/p mechanical thrombectomy.  He continues to have behavioral changes and memory problems and has loss of interest in prior activities.  He was on Fluoxetime after his stroke but they stopped it after a month.  He is still on antiplatelet and statin.  Prior to this visit he became angry waiting for a ride and began slurring his speech and his facial became more prominent.\par His biggest complaint is his neck pain which is daily and has been an issue for many years.  About 14 years ago in [facility] he developed paralysis in the right arm and leg and it was thought to be either a stroke or a problem with his neck.  At that time he did not have a stroke in his brain.

## 2020-03-04 ENCOUNTER — APPOINTMENT (OUTPATIENT)
Dept: CARDIOLOGY | Facility: CLINIC | Age: 70
End: 2020-03-04
Payer: MEDICAID

## 2020-03-04 PROCEDURE — 99204 OFFICE O/P NEW MOD 45 MIN: CPT

## 2020-03-04 PROCEDURE — 93000 ELECTROCARDIOGRAM COMPLETE: CPT

## 2020-03-06 ENCOUNTER — LABORATORY RESULT (OUTPATIENT)
Age: 70
End: 2020-03-06

## 2020-03-12 ENCOUNTER — APPOINTMENT (OUTPATIENT)
Dept: NEUROLOGY | Facility: CLINIC | Age: 70
End: 2020-03-12
Payer: MEDICAID

## 2020-03-12 PROCEDURE — 95816 EEG AWAKE AND DROWSY: CPT

## 2020-03-13 ENCOUNTER — APPOINTMENT (OUTPATIENT)
Dept: CARDIOLOGY | Facility: CLINIC | Age: 70
End: 2020-03-13
Payer: MEDICAID

## 2020-03-13 PROCEDURE — 0296T: CPT

## 2020-03-18 ENCOUNTER — APPOINTMENT (OUTPATIENT)
Dept: CARDIOLOGY | Facility: CLINIC | Age: 70
End: 2020-03-18

## 2020-06-24 ENCOUNTER — APPOINTMENT (OUTPATIENT)
Dept: CARDIOLOGY | Facility: CLINIC | Age: 70
End: 2020-06-24
Payer: MEDICAID

## 2020-06-24 PROCEDURE — 93978 VASCULAR STUDY: CPT

## 2020-06-25 ENCOUNTER — OUTPATIENT (OUTPATIENT)
Dept: OUTPATIENT SERVICES | Facility: HOSPITAL | Age: 70
LOS: 1 days | Discharge: HOME | End: 2020-06-25
Payer: MEDICAID

## 2020-06-25 ENCOUNTER — APPOINTMENT (OUTPATIENT)
Dept: NEUROLOGY | Facility: CLINIC | Age: 70
End: 2020-06-25
Payer: MEDICAID

## 2020-06-25 ENCOUNTER — RESULT REVIEW (OUTPATIENT)
Age: 70
End: 2020-06-25

## 2020-06-25 DIAGNOSIS — M54.12 RADICULOPATHY, CERVICAL REGION: ICD-10-CM

## 2020-06-25 DIAGNOSIS — Z95.1 PRESENCE OF AORTOCORONARY BYPASS GRAFT: Chronic | ICD-10-CM

## 2020-06-25 DIAGNOSIS — I63.9 CEREBRAL INFARCTION, UNSPECIFIED: ICD-10-CM

## 2020-06-25 PROCEDURE — 95816 EEG AWAKE AND DROWSY: CPT

## 2020-06-25 PROCEDURE — 72141 MRI NECK SPINE W/O DYE: CPT | Mod: 26

## 2020-07-13 ENCOUNTER — APPOINTMENT (OUTPATIENT)
Dept: CARDIOLOGY | Facility: CLINIC | Age: 70
End: 2020-07-13
Payer: MEDICAID

## 2020-07-13 PROCEDURE — 99214 OFFICE O/P EST MOD 30 MIN: CPT

## 2020-07-13 PROCEDURE — 93000 ELECTROCARDIOGRAM COMPLETE: CPT

## 2020-07-14 ENCOUNTER — NON-APPOINTMENT (OUTPATIENT)
Age: 70
End: 2020-07-14

## 2020-07-16 ENCOUNTER — LABORATORY RESULT (OUTPATIENT)
Age: 70
End: 2020-07-16

## 2020-07-23 ENCOUNTER — RESULT REVIEW (OUTPATIENT)
Age: 70
End: 2020-07-23

## 2020-07-23 ENCOUNTER — OUTPATIENT (OUTPATIENT)
Dept: OUTPATIENT SERVICES | Facility: HOSPITAL | Age: 70
LOS: 1 days | Discharge: HOME | End: 2020-07-23
Payer: MEDICAID

## 2020-07-23 DIAGNOSIS — I67.89 OTHER CEREBROVASCULAR DISEASE: ICD-10-CM

## 2020-07-23 DIAGNOSIS — Z95.1 PRESENCE OF AORTOCORONARY BYPASS GRAFT: Chronic | ICD-10-CM

## 2020-07-23 DIAGNOSIS — I10 ESSENTIAL (PRIMARY) HYPERTENSION: ICD-10-CM

## 2020-07-23 DIAGNOSIS — I25.10 ATHEROSCLEROTIC HEART DISEASE OF NATIVE CORONARY ARTERY WITHOUT ANGINA PECTORIS: ICD-10-CM

## 2020-07-23 DIAGNOSIS — I47.2 VENTRICULAR TACHYCARDIA: ICD-10-CM

## 2020-07-23 PROCEDURE — 78452 HT MUSCLE IMAGE SPECT MULT: CPT | Mod: 26

## 2020-07-30 ENCOUNTER — OUTPATIENT (OUTPATIENT)
Dept: OUTPATIENT SERVICES | Facility: HOSPITAL | Age: 70
LOS: 1 days | Discharge: HOME | End: 2020-07-30

## 2020-07-30 DIAGNOSIS — M48.01 SPINAL STENOSIS, OCCIPITO-ATLANTO-AXIAL REGION: ICD-10-CM

## 2020-07-30 DIAGNOSIS — Z95.1 PRESENCE OF AORTOCORONARY BYPASS GRAFT: Chronic | ICD-10-CM

## 2020-08-05 ENCOUNTER — APPOINTMENT (OUTPATIENT)
Dept: NEUROLOGY | Facility: CLINIC | Age: 70
End: 2020-08-05

## 2020-08-14 ENCOUNTER — APPOINTMENT (OUTPATIENT)
Dept: CARDIOLOGY | Facility: CLINIC | Age: 70
End: 2020-08-14
Payer: MEDICAID

## 2020-08-14 PROCEDURE — 93356 MYOCRD STRAIN IMG SPCKL TRCK: CPT

## 2020-08-14 PROCEDURE — 93306 TTE W/DOPPLER COMPLETE: CPT

## 2020-08-18 ENCOUNTER — RECORD ABSTRACTING (OUTPATIENT)
Age: 70
End: 2020-08-18

## 2020-08-18 DIAGNOSIS — Z82.49 FAMILY HISTORY OF ISCHEMIC HEART DISEASE AND OTHER DISEASES OF THE CIRCULATORY SYSTEM: ICD-10-CM

## 2020-08-31 ENCOUNTER — APPOINTMENT (OUTPATIENT)
Dept: CARDIOLOGY | Facility: CLINIC | Age: 70
End: 2020-08-31
Payer: MEDICAID

## 2020-08-31 VITALS
TEMPERATURE: 96.2 F | HEIGHT: 66 IN | DIASTOLIC BLOOD PRESSURE: 76 MMHG | SYSTOLIC BLOOD PRESSURE: 150 MMHG | OXYGEN SATURATION: 97 % | WEIGHT: 184 LBS | RESPIRATION RATE: 16 BRPM | BODY MASS INDEX: 29.57 KG/M2 | HEART RATE: 47 BPM

## 2020-08-31 VITALS — DIASTOLIC BLOOD PRESSURE: 80 MMHG | SYSTOLIC BLOOD PRESSURE: 126 MMHG

## 2020-08-31 PROCEDURE — 99214 OFFICE O/P EST MOD 30 MIN: CPT

## 2020-08-31 RX ORDER — SPIRONOLACTONE 25 MG/1
25 TABLET ORAL DAILY
Refills: 0 | Status: DISCONTINUED | COMMUNITY
End: 2020-08-31

## 2020-08-31 RX ORDER — FLUOXETINE HYDROCHLORIDE 10 MG/1
10 TABLET ORAL AT BEDTIME
Qty: 30 | Refills: 5 | Status: DISCONTINUED | COMMUNITY
Start: 2019-08-16 | End: 2020-08-31

## 2020-08-31 RX ORDER — BISOPROLOL FUMARATE 5 MG/1
5 TABLET, FILM COATED ORAL DAILY
Refills: 0 | Status: DISCONTINUED | COMMUNITY
End: 2020-08-31

## 2020-08-31 NOTE — DISCUSSION/SUMMARY
[FreeTextEntry1] : Echocardiogram results were reviewed.\par Pharmacological MPI results were reviewed.\par Holter monitor results were reviewed.\par Recent lab results were reviewed.\par Ekg results were reviewed.\par \par CBC, BMP, PT/PTT/INR,COVID-19 PCR\par Cardiac catheterization\par Carotid artery duplex\par F/u after cardiac catheterization

## 2020-08-31 NOTE — ASSESSMENT
[FreeTextEntry1] : CVA - right middle cerebral artery thrombectomy - 7/2019 - stable\par HTN - controlled\par h/o CAD - s/p CABG around 2010 - stable\par CVA with parlysis - around 2007 - stable\par NSVT - stable\par Ischemic heart disease -stable\par s/p CABG - 3 vessel - 12 years ago in [place] - stable

## 2020-08-31 NOTE — HISTORY OF PRESENT ILLNESS
[FreeTextEntry1] : 70 year old male came in today for evaluation of a CVA 7 months ago, and a blood clot in the leg 1 year ago. The patient had a thrombectomy of the right middle cerebral artery at that time. The patient had a CABG 12 years ago in [place] and a stoke with paralysis of left side 13 years ago.\par $The patient denies chest pains, shortness of breath, palpitations, dizziness, loss of consciousness, or swelling of the ankles.\par \par The patient never smoked. The patient denies substance abuse. The patient has HTN. The patient denies DM, or high cholesterol. The patient does snore. The patient does not have difficulty sleeping or dozing off during the day.\par \par PMHX:\par \par CVA - right middle cerebral artery thrombectomy - 7/2019 - stable\par HTN - controlled\par h/o CAD - s/p CABG around 2010 - stable\par CVA with parlysis - around 2007 - stable\par NSVT - stable\par Ischemic heart disease -stable\par s/p CABG - 3 vessel - 12 years ago in [place] - stable\par \par Medications reviewed and reconciled with patient. Patient is compliant with taking appropriate medications at appropriate doses at appropriate intervals without missing doses.

## 2020-08-31 NOTE — PHYSICAL EXAM
[General Appearance - Well Developed] : well developed [Normal Appearance] : normal appearance [Well Groomed] : well groomed [General Appearance - Well Nourished] : well nourished [No Deformities] : no deformities [General Appearance - In No Acute Distress] : no acute distress [Normal Conjunctiva] : the conjunctiva exhibited no abnormalities [Eyelids - No Xanthelasma] : the eyelids demonstrated no xanthelasmas [Normal Oral Mucosa] : normal oral mucosa [No Oral Pallor] : no oral pallor [No Oral Cyanosis] : no oral cyanosis [Normal Jugular Venous A Waves Present] : normal jugular venous A waves present [Normal Jugular Venous V Waves Present] : normal jugular venous V waves present [No Jugular Venous Bello A Waves] : no jugular venous bello A waves [Respiration, Rhythm And Depth] : normal respiratory rhythm and effort [Exaggerated Use Of Accessory Muscles For Inspiration] : no accessory muscle use [Auscultation Breath Sounds / Voice Sounds] : lungs were clear to auscultation bilaterally [5th Left ICS - MCL] : palpated at the 5th LICS in the midclavicular line [Normal Rate] : normal [Normal S1] : normal S1 [Normal S2] : normal S2 [No Murmur] : no murmurs heard [No Pitting Edema] : no pitting edema present [2+] : left 2+ [No Abnormalities] : the abdominal aorta was not enlarged and no bruit was heard [Abdomen Soft] : soft [Abdomen Tenderness] : non-tender [Abdomen Mass (___ Cm)] : no abdominal mass palpated [Gait - Sufficient For Exercise Testing] : the gait was sufficient for exercise testing [Abnormal Walk] : normal gait [Cyanosis, Localized] : no localized cyanosis [Nail Clubbing] : no clubbing of the fingernails [Petechial Hemorrhages (___cm)] : no petechial hemorrhages [] : no ischemic changes [Skin Color & Pigmentation] : normal skin color and pigmentation [Skin Lesions] : no skin lesions [No Venous Stasis] : no venous stasis [Oriented To Time, Place, And Person] : oriented to person, place, and time [No Xanthoma] : no  xanthoma was observed [No Skin Ulcers] : no skin ulcer [Mood] : the mood was normal [Affect] : the affect was normal [No Anxiety] : not feeling anxious [S3] : no S3 [S4] : no S4 [Right Carotid Bruit] : no bruit heard over the right carotid [Left Carotid Bruit] : no bruit heard over the left carotid [Right Femoral Bruit] : no bruit heard over the right femoral artery [Left Femoral Bruit] : no bruit heard over the left femoral artery

## 2020-08-31 NOTE — REASON FOR VISIT
[Follow-Up - Clinic] : a clinic follow-up of [Coronary Artery Disease] : coronary artery disease [Ventricular Tachycardia] : ventricular tachycardia

## 2020-09-01 LAB
ANION GAP SERPL CALC-SCNC: 11 MMOL/L
APTT BLD: 27.5 SEC
BASOPHILS # BLD AUTO: 0.02 K/UL
BASOPHILS NFR BLD AUTO: 0.4 %
BUN SERPL-MCNC: 23 MG/DL
CALCIUM SERPL-MCNC: 9.2 MG/DL
CHLORIDE SERPL-SCNC: 105 MMOL/L
CO2 SERPL-SCNC: 27 MMOL/L
CREAT SERPL-MCNC: 1 MG/DL
EOSINOPHIL # BLD AUTO: 0.39 K/UL
EOSINOPHIL NFR BLD AUTO: 7.2 %
GLUCOSE SERPL-MCNC: 104 MG/DL
HCT VFR BLD CALC: 40.2 %
HGB BLD-MCNC: 13.1 G/DL
IMM GRANULOCYTES NFR BLD AUTO: 0.2 %
INR PPP: 0.98 RATIO
LYMPHOCYTES # BLD AUTO: 1.91 K/UL
LYMPHOCYTES NFR BLD AUTO: 35.1 %
MAN DIFF?: NORMAL
MCHC RBC-ENTMCNC: 30.3 PG
MCHC RBC-ENTMCNC: 32.6 G/DL
MCV RBC AUTO: 92.8 FL
MONOCYTES # BLD AUTO: 0.7 K/UL
MONOCYTES NFR BLD AUTO: 12.9 %
NEUTROPHILS # BLD AUTO: 2.41 K/UL
NEUTROPHILS NFR BLD AUTO: 44.2 %
PLATELET # BLD AUTO: 204 K/UL
POTASSIUM SERPL-SCNC: 4.9 MMOL/L
PT BLD: 11.3 SEC
RBC # BLD: 4.33 M/UL
RBC # FLD: 12.6 %
SODIUM SERPL-SCNC: 143 MMOL/L
WBC # FLD AUTO: 5.44 K/UL

## 2020-09-02 ENCOUNTER — APPOINTMENT (OUTPATIENT)
Dept: CARDIOLOGY | Facility: CLINIC | Age: 70
End: 2020-09-02
Payer: MEDICAID

## 2020-09-02 PROCEDURE — 93880 EXTRACRANIAL BILAT STUDY: CPT

## 2020-09-08 ENCOUNTER — OUTPATIENT (OUTPATIENT)
Dept: OUTPATIENT SERVICES | Facility: HOSPITAL | Age: 70
LOS: 1 days | Discharge: HOME | End: 2020-09-08

## 2020-09-08 ENCOUNTER — LABORATORY RESULT (OUTPATIENT)
Age: 70
End: 2020-09-08

## 2020-09-08 DIAGNOSIS — Z95.1 PRESENCE OF AORTOCORONARY BYPASS GRAFT: Chronic | ICD-10-CM

## 2020-09-08 DIAGNOSIS — Z11.59 ENCOUNTER FOR SCREENING FOR OTHER VIRAL DISEASES: ICD-10-CM

## 2020-09-10 ENCOUNTER — OUTPATIENT (OUTPATIENT)
Dept: OUTPATIENT SERVICES | Facility: HOSPITAL | Age: 70
LOS: 1 days | Discharge: HOME | End: 2020-09-10
Payer: MEDICAID

## 2020-09-10 DIAGNOSIS — Z95.1 PRESENCE OF AORTOCORONARY BYPASS GRAFT: Chronic | ICD-10-CM

## 2020-09-10 LAB
ANION GAP SERPL CALC-SCNC: 9 MMOL/L — SIGNIFICANT CHANGE UP (ref 7–14)
BUN SERPL-MCNC: 28 MG/DL — HIGH (ref 10–20)
CALCIUM SERPL-MCNC: 9.3 MG/DL — SIGNIFICANT CHANGE UP (ref 8.5–10.1)
CHLORIDE SERPL-SCNC: 105 MMOL/L — SIGNIFICANT CHANGE UP (ref 98–110)
CO2 SERPL-SCNC: 26 MMOL/L — SIGNIFICANT CHANGE UP (ref 17–32)
CREAT SERPL-MCNC: 1.1 MG/DL — SIGNIFICANT CHANGE UP (ref 0.7–1.5)
GLUCOSE SERPL-MCNC: 120 MG/DL — HIGH (ref 70–99)
HCT VFR BLD CALC: 42.5 % — SIGNIFICANT CHANGE UP (ref 42–52)
HGB BLD-MCNC: 14 G/DL — SIGNIFICANT CHANGE UP (ref 14–18)
MCHC RBC-ENTMCNC: 30.4 PG — SIGNIFICANT CHANGE UP (ref 27–31)
MCHC RBC-ENTMCNC: 32.9 G/DL — SIGNIFICANT CHANGE UP (ref 32–37)
MCV RBC AUTO: 92.4 FL — SIGNIFICANT CHANGE UP (ref 80–94)
NRBC # BLD: 0 /100 WBCS — SIGNIFICANT CHANGE UP (ref 0–0)
PLATELET # BLD AUTO: 251 K/UL — SIGNIFICANT CHANGE UP (ref 130–400)
POTASSIUM SERPL-MCNC: 4.1 MMOL/L — SIGNIFICANT CHANGE UP (ref 3.5–5)
POTASSIUM SERPL-SCNC: 4.1 MMOL/L — SIGNIFICANT CHANGE UP (ref 3.5–5)
RBC # BLD: 4.6 M/UL — LOW (ref 4.7–6.1)
RBC # FLD: 12.8 % — SIGNIFICANT CHANGE UP (ref 11.5–14.5)
SODIUM SERPL-SCNC: 140 MMOL/L — SIGNIFICANT CHANGE UP (ref 135–146)
WBC # BLD: 7.73 K/UL — SIGNIFICANT CHANGE UP (ref 4.8–10.8)
WBC # FLD AUTO: 7.73 K/UL — SIGNIFICANT CHANGE UP (ref 4.8–10.8)

## 2020-09-10 PROCEDURE — 93459 L HRT ART/GRFT ANGIO: CPT | Mod: 26

## 2020-09-10 NOTE — ASU PATIENT PROFILE, ADULT - PMH
CAD (coronary artery disease)  s/p stenting and CABG 10 years ago  DVT, lower extremity  January 2019, not currently on anticoagulation  HLD (hyperlipidemia)    Stroke  10 years ago as per MD

## 2020-09-10 NOTE — H&P CARDIOLOGY - HISTORY OF PRESENT ILLNESS
Chief Complaint   Patient presents with     Allied Health Visit      Lupron Depot 3.75 mg  injection            Prior to injection, verified patient identity using patient's name and date of birth.  Due to injection administration, patient instructed to remain in clinic for 15 minutes  afterwards, and to report any adverse reaction to me immediately.    Lupron Depot    Drug Amount Wasted:  None.  Vial/Syringe: Single dose vial  Expiration Date:  7/30/2021   Pre cath note:    indication:  [ ] STEMI                [ ] NSTEMI                 [ ] Acute coronary syndrome                     [ ]Unstable Angina   [ ] high risk  [ ] intermediate risk  [ ] low risk                     [ ] Stable Angina     non-invasive testing:           Nuc stress test               Date:    7/23/20               result: [ ] high risk  [x ] intermediate risk  [ ] low risk    Anti- Anginal medications:                    [ ] not used                       [x ] used                   [ ] not used but strong indication not to use    Ejection Fraction                   [ ] <29            [ ] 30-39%   [x ] 40-49%     [ ]>50%    CHF                   [ ] active (within last 14 days on meds   [ ] Chronic (on meds but no exacerbation)    COPD                   [ ] mild (on chronic bronchodilators)  [ ] moderate (on chronic steroid therapy)      [ ] severe (indication for home O2 or PACO2 >50)    Other risk factors:                       [ ] Previous MI                     [x] CVA/ stroke                    [ ] carotid stent/ CEA                    [ ] PVD/PAD- (arterial aneurysm, non-palpable pulses, tortuous vessel with inability to insert catheter, infra-renal dissection, renal or subclavian artery stenosis)                    [ ] diabetic                    [x] previous CABG                    [ ] Renal Failure                           14.0   7.73  )-----------( 251      ( 10 Sep 2020 07:15 )             42.5                                -Start IV Fluids NS at : 1ml/KG for 1 Hr prior to procedure if patient has signs of Heart Failure

## 2020-09-10 NOTE — CHART NOTE - NSCHARTNOTEFT_GEN_A_CORE
PRE-OP DIAGNOSIS: suspected CAD, abnormal stress test    PROCEDURE:[ x ] LHC                         [ x ] Coronary angiography                         [  ] [facility]  Physician: Dr Espinosa   Assistant: Jorge Luis    ANESTHESIA TYPE:  [  ]General Anesthesia  [ x ] Sedation  [ x ] Local/Regional    ESTIMATED BLOOD LOSS:    10   mL    CONDITION  [  ] Critical  [  ] Serious  [ ]Fair  [x ]Good    IV CONTRAST:        65     mL    FINDINGS  Left Heart Catheterization:  LVEF%: 35  LVEDP: normal      ACCESS:    [ ] right radial artery  [ x] right femoral artery    LEFT HEART CATHETERIZATION                                    Left main: Mild disease  LAD: Moderate disease in mid-segment. Mid and distal vessel supplied by a patent LIMA graft  Diag: Luminal irregularities   Left Circumflex: luminal irregularities  OM: luminal irregularities  Right Coronary Artery: 60% mid-segment stenosis  RPDA: 100% at the ostium. Supplied by a patent SVG      INTERVENTION  IMPLANTS: none    SPECIMEN REMOVED: none        POST-OP DIAGNOSIS    Non-obstructive CAD  Patent LIMA and SVG to RPDA       PLAN OF CARE  [x] D/C Home today  [ x]  Continue ASA, B-blocker & Statin therapy PRE-OP DIAGNOSIS: suspected CAD, abnormal stress test    PROCEDURE:[ x ] LHC                         [ x ] Coronary angiography                         [  ] [facility]  Physician: Dr Espinosa   Assistant: Jorge Luis    ANESTHESIA TYPE:  [  ]General Anesthesia  [ x ] Sedation  [ x ] Local/Regional    ESTIMATED BLOOD LOSS:    10   mL    CONDITION  [  ] Critical  [  ] Serious  [ ]Fair  [x ]Good    IV CONTRAST:        65     mL    FINDINGS  Left Heart Catheterization:  LVEF%: 35  LVEDP: normal      ACCESS:    [ ] right radial artery  [ x] right femoral artery    LEFT HEART CATHETERIZATION                                    Left main: Mild disease  LAD: Moderate disease in mid-segment 70 % diffuse lesion. Mid and distal vessel supplied by a patent LIMA graft  Diag: Luminal irregularities   Left Circumflex: luminal irregularities, mid - mild disease  OM: luminal irregularities  Right Coronary Artery: 60% mid-segment stenosis  RPDA: 100% at the ostium. Supplied by a patent SVG    Grafts:    LIMA to LAD - patent  SVG to RPDA - patent      INTERVENTION  IMPLANTS: none    SPECIMEN REMOVED: none        POST-OP DIAGNOSIS    Non-obstructive CAD  Patent LIMA and SVG to RPDA   Ischemic CM      PLAN OF CARE  [x] D/C Home today  [ x]  Continue ASA, B-blocker & Statin therapy  [x] f/u with Dr. Quiles

## 2020-09-14 DIAGNOSIS — I25.798 ATHEROSCLEROSIS OF OTHER CORONARY ARTERY BYPASS GRAFT(S) WITH OTHER FORMS OF ANGINA PECTORIS: ICD-10-CM

## 2020-09-14 DIAGNOSIS — Z86.73 PERSONAL HISTORY OF TRANSIENT ISCHEMIC ATTACK (TIA), AND CEREBRAL INFARCTION WITHOUT RESIDUAL DEFICITS: ICD-10-CM

## 2020-09-14 DIAGNOSIS — I10 ESSENTIAL (PRIMARY) HYPERTENSION: ICD-10-CM

## 2020-09-14 DIAGNOSIS — R94.39 ABNORMAL RESULT OF OTHER CARDIOVASCULAR FUNCTION STUDY: ICD-10-CM

## 2020-09-14 DIAGNOSIS — Z79.82 LONG TERM (CURRENT) USE OF ASPIRIN: ICD-10-CM

## 2020-09-14 DIAGNOSIS — Z95.1 PRESENCE OF AORTOCORONARY BYPASS GRAFT: ICD-10-CM

## 2020-09-29 ENCOUNTER — OUTPATIENT (OUTPATIENT)
Dept: OUTPATIENT SERVICES | Facility: HOSPITAL | Age: 70
LOS: 1 days | Discharge: HOME | End: 2020-09-29

## 2020-09-29 DIAGNOSIS — M79.10 MYALGIA, UNSPECIFIED SITE: ICD-10-CM

## 2020-09-29 DIAGNOSIS — M48.01 SPINAL STENOSIS, OCCIPITO-ATLANTO-AXIAL REGION: ICD-10-CM

## 2020-09-29 DIAGNOSIS — Z95.1 PRESENCE OF AORTOCORONARY BYPASS GRAFT: Chronic | ICD-10-CM

## 2020-09-30 ENCOUNTER — APPOINTMENT (OUTPATIENT)
Dept: NEUROLOGY | Facility: CLINIC | Age: 70
End: 2020-09-30
Payer: MEDICAID

## 2020-09-30 VITALS
TEMPERATURE: 97.8 F | WEIGHT: 184 LBS | HEIGHT: 66 IN | BODY MASS INDEX: 29.57 KG/M2 | DIASTOLIC BLOOD PRESSURE: 65 MMHG | HEART RATE: 50 BPM | OXYGEN SATURATION: 97 % | SYSTOLIC BLOOD PRESSURE: 129 MMHG

## 2020-09-30 PROCEDURE — 99214 OFFICE O/P EST MOD 30 MIN: CPT

## 2020-09-30 NOTE — PHYSICAL EXAM
[FreeTextEntry1] : a+Ox3 language and attention normal\par CN 2-12 normal with slight left facial\par power 5/5 in all extremities\par Temp, Vib, PP symmetric\par FTN NL\par Gait normal\par DTR 2+ in UE and absent in LE\par \par NIHSS 0\par mrankin 0.

## 2020-09-30 NOTE — HISTORY OF PRESENT ILLNESS
[FreeTextEntry1] : Silverio is a 69yo man here for follow up of his stroke and cervical radiculopathy/stenosis.  Since last visit he had MRI cervical spine showing moderate spinal stenosis and multilevel radicular disease. He started physical therapy and was doing 2 sessions per week and had a noticeable improvement in his pain and neck mobility.  His  has been stable in the 80's and hgba1c is 6.2.  He is compliant with medications.  He had workup with his cardiologist and no major abnormalities were found.\par His behavior is better on the celexa and he is less anxious and angry all the time

## 2020-09-30 NOTE — DISCUSSION/SUMMARY
[Antithrombotic therapy with ___] : antithrombotic therapy with  [unfilled] [Lipid Lowering Therapy] : lipid lowering therapy [Patient encouraged to discuss with Primary MD] : I encouraged the patient to discuss these important issues with ~his/her~ primary care doctor [Goals and Counseling] : I have reviewed the goals of stroke risk factor modification. I counseled the patient on measures to reduce stroke risk, including the importance of medication compliance, risk factor control, exercise, healthy diet and avoidance of smoking. I reviewed stroke warning signs and symptoms and appropriate actions to take if such occur. [FreeTextEntry1] : Mr. 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  His neck issues have improved with PT so can continue and begin doing it on his own as well. His stroke risk factors are fairly well controlled.\par 1. Continue aspirin 325mg QD and atrovastatin\par 2. Exercise and diet\par 3. Continue celexa 10mg QD \par 4. f/u in 6 months

## 2020-10-08 ENCOUNTER — APPOINTMENT (OUTPATIENT)
Dept: CARDIOLOGY | Facility: CLINIC | Age: 70
End: 2020-10-08
Payer: MEDICAID

## 2020-10-08 VITALS
DIASTOLIC BLOOD PRESSURE: 78 MMHG | RESPIRATION RATE: 16 BRPM | TEMPERATURE: 98.6 F | OXYGEN SATURATION: 96 % | HEART RATE: 50 BPM | HEIGHT: 66 IN | WEIGHT: 187 LBS | SYSTOLIC BLOOD PRESSURE: 116 MMHG | BODY MASS INDEX: 30.05 KG/M2

## 2020-10-08 PROCEDURE — 93000 ELECTROCARDIOGRAM COMPLETE: CPT

## 2020-10-08 PROCEDURE — 99214 OFFICE O/P EST MOD 30 MIN: CPT

## 2020-10-08 NOTE — DISCUSSION/SUMMARY
[___ Month(s)] : [unfilled] month(s) [With Me] : with me [FreeTextEntry1] : Ekg results were reviewed.\par Recent lab results were reviewed.\par Cardiac catheterization results were reviewed.\par \par Fasting CBC, BMP, LFTs, Lipid Profile, HgbA1c, CRP, UA, Urine Microalbumin, Mg, Ca, TSH, T3, T4 prior to next visit\par f/u 6 months

## 2020-10-08 NOTE — PHYSICAL EXAM
[General Appearance - Well Developed] : well developed [Normal Appearance] : normal appearance [Well Groomed] : well groomed [General Appearance - Well Nourished] : well nourished [No Deformities] : no deformities [General Appearance - In No Acute Distress] : no acute distress [Normal Conjunctiva] : the conjunctiva exhibited no abnormalities [Eyelids - No Xanthelasma] : the eyelids demonstrated no xanthelasmas [Normal Oral Mucosa] : normal oral mucosa [No Oral Pallor] : no oral pallor [No Oral Cyanosis] : no oral cyanosis [Normal Jugular Venous A Waves Present] : normal jugular venous A waves present [Normal Jugular Venous V Waves Present] : normal jugular venous V waves present [No Jugular Venous Bello A Waves] : no jugular venous bello A waves [Respiration, Rhythm And Depth] : normal respiratory rhythm and effort [Exaggerated Use Of Accessory Muscles For Inspiration] : no accessory muscle use [Auscultation Breath Sounds / Voice Sounds] : lungs were clear to auscultation bilaterally [Abdomen Soft] : soft [Abdomen Tenderness] : non-tender [Abdomen Mass (___ Cm)] : no abdominal mass palpated [Abnormal Walk] : normal gait [Gait - Sufficient For Exercise Testing] : the gait was sufficient for exercise testing [Nail Clubbing] : no clubbing of the fingernails [Cyanosis, Localized] : no localized cyanosis [Petechial Hemorrhages (___cm)] : no petechial hemorrhages [Skin Color & Pigmentation] : normal skin color and pigmentation [] : no rash [No Venous Stasis] : no venous stasis [Skin Lesions] : no skin lesions [No Skin Ulcers] : no skin ulcer [No Xanthoma] : no  xanthoma was observed [Oriented To Time, Place, And Person] : oriented to person, place, and time [Affect] : the affect was normal [Mood] : the mood was normal [No Anxiety] : not feeling anxious [5th Left ICS - MCL] : palpated at the 5th LICS in the midclavicular line [Normal Rate] : normal [Normal S1] : normal S1 [Normal S2] : normal S2 [No Murmur] : no murmurs heard [2+] : left 2+ [No Abnormalities] : the abdominal aorta was not enlarged and no bruit was heard [No Pitting Edema] : no pitting edema present [Heart Rate And Rhythm] : heart rate and rhythm were normal [Heart Sounds] : normal S1 and S2 [Murmurs] : no murmurs present [Arterial Pulses Normal] : the arterial pulses were normal [Edema] : no peripheral edema present [S3] : no S3 [S4] : no S4 [Right Carotid Bruit] : no bruit heard over the right carotid [Left Carotid Bruit] : no bruit heard over the left carotid [Right Femoral Bruit] : no bruit heard over the right femoral artery [Left Femoral Bruit] : no bruit heard over the left femoral artery

## 2021-04-15 ENCOUNTER — APPOINTMENT (OUTPATIENT)
Dept: CARDIOLOGY | Facility: CLINIC | Age: 71
End: 2021-04-15
Payer: MEDICAID

## 2021-04-15 ENCOUNTER — RESULT CHARGE (OUTPATIENT)
Age: 71
End: 2021-04-15

## 2021-04-15 VITALS
DIASTOLIC BLOOD PRESSURE: 76 MMHG | WEIGHT: 192 LBS | BODY MASS INDEX: 30.86 KG/M2 | OXYGEN SATURATION: 96 % | SYSTOLIC BLOOD PRESSURE: 108 MMHG | RESPIRATION RATE: 16 BRPM | HEART RATE: 52 BPM | TEMPERATURE: 98.2 F | HEIGHT: 66 IN

## 2021-04-15 PROCEDURE — 99214 OFFICE O/P EST MOD 30 MIN: CPT

## 2021-04-15 PROCEDURE — 93000 ELECTROCARDIOGRAM COMPLETE: CPT

## 2021-04-15 RX ORDER — CITALOPRAM HYDROBROMIDE 10 MG/1
10 TABLET, FILM COATED ORAL DAILY
Qty: 30 | Refills: 6 | Status: DISCONTINUED | COMMUNITY
Start: 2020-03-03 | End: 2021-04-15

## 2021-04-15 NOTE — ASSESSMENT
[FreeTextEntry1] : CVA - right middle cerebral artery thrombectomy - 7/2019 - stable\par HTN - controlled\par h/o CAD - s/p CABG around 2010 - stable\par CVA with parlysis - around 2007 - stable\par NSVT - stable\par Ischemic heart disease -stable\par s/p CABG - 3 vessel - 12 years ago in [place] - stable\par Venous thrombosis - 1/2019 - stable\par Leg pain - new

## 2021-04-15 NOTE — HISTORY OF PRESENT ILLNESS
[FreeTextEntry1] : 70 year old male came in today for evaluation of a CVA 7 months ago, and a blood clot in the leg 1 year ago. The patient had a thrombectomy of the right middle cerebral artery at that time. The patient had a CABG 12 years ago in [place] and a stoke with paralysis of left side 13 years ago.\par $The patient denies chest pains, shortness of breath, palpitations, dizziness, loss of consciousness, or swelling of the ankles.\par \par The patient never smoked. The patient denies substance abuse. The patient has HTN. The patient denies DM, or high cholesterol. The patient does snore. The patient does not have difficulty sleeping or dozing off during the day.\par \par The patient complains of left calf pain when he walks about 15 min.  Sometimes he also has it on the right.  It only happens with walking.\par \par PMHX:\par \par CVA - right middle cerebral artery thrombectomy - 7/2019 - stable\par HTN - controlled\par h/o CAD - s/p CABG around 2010 - stable\par CVA with parlysis - around 2007 - stable\par NSVT - stable\par Ischemic heart disease -stable\par s/p CABG - 3 vessel - 12 years ago in [place] - stable\par Venous thrombosis - 1/2019 - stable\par Leg pain - new\par \par \par Medications reviewed and reconciled with patient. Patient is compliant with taking appropriate medications at appropriate doses at appropriate intervals without missing doses.

## 2021-04-15 NOTE — PHYSICAL EXAM
[General Appearance - Well Developed] : well developed [Normal Appearance] : normal appearance [Well Groomed] : well groomed [General Appearance - Well Nourished] : well nourished [No Deformities] : no deformities [General Appearance - In No Acute Distress] : no acute distress [Normal Conjunctiva] : the conjunctiva exhibited no abnormalities [Eyelids - No Xanthelasma] : the eyelids demonstrated no xanthelasmas [Normal Oral Mucosa] : normal oral mucosa [No Oral Pallor] : no oral pallor [No Oral Cyanosis] : no oral cyanosis [Normal Jugular Venous A Waves Present] : normal jugular venous A waves present [Normal Jugular Venous V Waves Present] : normal jugular venous V waves present [No Jugular Venous Bello A Waves] : no jugular venous bello A waves [Respiration, Rhythm And Depth] : normal respiratory rhythm and effort [Exaggerated Use Of Accessory Muscles For Inspiration] : no accessory muscle use [Auscultation Breath Sounds / Voice Sounds] : lungs were clear to auscultation bilaterally [Heart Rate And Rhythm] : heart rate and rhythm were normal [Heart Sounds] : normal S1 and S2 [Murmurs] : no murmurs present [Arterial Pulses Normal] : the arterial pulses were normal [Edema] : no peripheral edema present [Abdomen Soft] : soft [Abdomen Tenderness] : non-tender [Abdomen Mass (___ Cm)] : no abdominal mass palpated [Abnormal Walk] : normal gait [Gait - Sufficient For Exercise Testing] : the gait was sufficient for exercise testing [Nail Clubbing] : no clubbing of the fingernails [Cyanosis, Localized] : no localized cyanosis [Petechial Hemorrhages (___cm)] : no petechial hemorrhages [Skin Color & Pigmentation] : normal skin color and pigmentation [] : no rash [No Venous Stasis] : no venous stasis [Skin Lesions] : no skin lesions [No Skin Ulcers] : no skin ulcer [No Xanthoma] : no  xanthoma was observed [Oriented To Time, Place, And Person] : oriented to person, place, and time [Affect] : the affect was normal [Mood] : the mood was normal [No Anxiety] : not feeling anxious [5th Left ICS - MCL] : palpated at the 5th LICS in the midclavicular line [Normal Rate] : normal [Normal S1] : normal S1 [Normal S2] : normal S2 [No Murmur] : no murmurs heard [2+] : left 2+ [No Abnormalities] : the abdominal aorta was not enlarged and no bruit was heard [No Pitting Edema] : no pitting edema present [S3] : no S3 [S4] : no S4 [Right Carotid Bruit] : no bruit heard over the right carotid [Left Carotid Bruit] : no bruit heard over the left carotid [Right Femoral Bruit] : no bruit heard over the right femoral artery [Left Femoral Bruit] : no bruit heard over the left femoral artery

## 2021-04-15 NOTE — DISCUSSION/SUMMARY
[___ Month(s)] : [unfilled] month(s) [With Me] : with me [FreeTextEntry1] : Ekg results were reviewed.\par \par Arterial duplex lower extremities - leg pains\par PVR lower extremities - leg pains\par Venous duplex of lower extremities - leg pains, h/o DVT\par Fasting CBC, BMP, LFTs, Lipid Profile, HgbA1c, CRP, UA, Urine Microalbumin, Mg, Ca, TSH, T3, T4 prior to next visit\par f/u 1 month\par \par Number/complexity of problems -  Mod - 1 or more chronic illnesses with exacerbation, progression or side effects of treatment\par Amount/complexity of data -history, exam, reviewed old note, ekg reviewed\par Risk of complications - Mod\par \par \par

## 2021-05-15 LAB
ALBUMIN SERPL ELPH-MCNC: 3.9 G/DL
ALP BLD-CCNC: 109 U/L
ALT SERPL-CCNC: 19 U/L
ANION GAP SERPL CALC-SCNC: 9 MMOL/L
AST SERPL-CCNC: 19 U/L
BASOPHILS # BLD AUTO: 0.02 K/UL
BASOPHILS NFR BLD AUTO: 0.3 %
BILIRUB DIRECT SERPL-MCNC: <0.2 MG/DL
BILIRUB INDIRECT SERPL-MCNC: >0.3 MG/DL
BILIRUB SERPL-MCNC: 0.5 MG/DL
BUN SERPL-MCNC: 29 MG/DL
CALCIUM SERPL-MCNC: 9.5 MG/DL
CHLORIDE SERPL-SCNC: 101 MMOL/L
CHOLEST SERPL-MCNC: 156 MG/DL
CO2 SERPL-SCNC: 29 MMOL/L
CREAT SERPL-MCNC: 1.1 MG/DL
CREAT SPEC-SCNC: 205 MG/DL
CRP SERPL HS-MCNC: 1.74 MG/L
EOSINOPHIL # BLD AUTO: 0.38 K/UL
EOSINOPHIL NFR BLD AUTO: 6.1 %
ESTIMATED AVERAGE GLUCOSE: 137 MG/DL
GLUCOSE SERPL-MCNC: 115 MG/DL
HBA1C MFR BLD HPLC: 6.4 %
HCT VFR BLD CALC: 40.8 %
HDLC SERPL-MCNC: 61 MG/DL
HGB BLD-MCNC: 13.3 G/DL
IMM GRANULOCYTES NFR BLD AUTO: 0.2 %
LDLC SERPL CALC-MCNC: 87 MG/DL
LYMPHOCYTES # BLD AUTO: 2.26 K/UL
LYMPHOCYTES NFR BLD AUTO: 36 %
MAGNESIUM SERPL-MCNC: 2 MG/DL
MAN DIFF?: NORMAL
MCHC RBC-ENTMCNC: 30.3 PG
MCHC RBC-ENTMCNC: 32.6 G/DL
MCV RBC AUTO: 92.9 FL
MICROALBUMIN 24H UR DL<=1MG/L-MCNC: 1.7 MG/DL
MICROALBUMIN/CREAT 24H UR-RTO: 8 MG/G
MONOCYTES # BLD AUTO: 0.78 K/UL
MONOCYTES NFR BLD AUTO: 12.4 %
NEUTROPHILS # BLD AUTO: 2.82 K/UL
NEUTROPHILS NFR BLD AUTO: 45 %
NONHDLC SERPL-MCNC: 95 MG/DL
PLATELET # BLD AUTO: 243 K/UL
POTASSIUM SERPL-SCNC: 4.5 MMOL/L
PROT SERPL-MCNC: 7 G/DL
RBC # BLD: 4.39 M/UL
RBC # FLD: 13.1 %
SODIUM SERPL-SCNC: 139 MMOL/L
T3 SERPL-MCNC: 114 NG/DL
T4 SERPL-MCNC: 6.4 UG/DL
TRIGL SERPL-MCNC: 78 MG/DL
TSH SERPL-ACNC: 1.24 UIU/ML
WBC # FLD AUTO: 6.27 K/UL

## 2021-05-19 ENCOUNTER — APPOINTMENT (OUTPATIENT)
Dept: CARDIOLOGY | Facility: CLINIC | Age: 71
End: 2021-05-19
Payer: MEDICAID

## 2021-05-19 PROCEDURE — 93925 LOWER EXTREMITY STUDY: CPT

## 2021-05-19 PROCEDURE — 93923 UPR/LXTR ART STDY 3+ LVLS: CPT

## 2021-05-20 ENCOUNTER — APPOINTMENT (OUTPATIENT)
Dept: NEUROLOGY | Facility: CLINIC | Age: 71
End: 2021-05-20
Payer: MEDICAID

## 2021-05-20 VITALS
HEIGHT: 66 IN | DIASTOLIC BLOOD PRESSURE: 72 MMHG | SYSTOLIC BLOOD PRESSURE: 124 MMHG | TEMPERATURE: 98.2 F | OXYGEN SATURATION: 99 % | HEART RATE: 59 BPM

## 2021-05-20 PROCEDURE — 99213 OFFICE O/P EST LOW 20 MIN: CPT

## 2021-05-20 NOTE — REVIEW OF SYSTEMS
[As Noted in HPI] : as noted in HPI [Arthralgias] : arthralgias [Negative] : Eyes [Joint Pain] : joint pain

## 2021-05-20 NOTE — PHYSICAL EXAM
[FreeTextEntry1] : a+Ox3 language and attention normal\par CN 2-12 normal with slight left facial\par power 5/5 in all extremities\par Temp, Vib, PP symmetric\par FTN NL\par Gait normal\par DTR 2+ in UE and 2+ patellars and trace anklesin LE\par Pain in the right neck with head rotation\par \par NIHSS 0\par mrankin 0.

## 2021-05-20 NOTE — HISTORY OF PRESENT ILLNESS
[FreeTextEntry1] : Silverio is a 71yo man here for follow up of his stroke and cervical radiculopathy/stenosis.  Since last visit he had MRI cervical spine showing moderate spinal stenosis and multilevel radicular disease. He started physical therapy and was doing 2 sessions per week and had a noticeable improvement in his pain and neck mobility.  His  has been stable in the 80's and hgba1c is 6.2.  He is compliant with medications.  He had workup with his cardiologist and no major abnormalities were found.\par His behavior is better on the celexa and he is less anxious and angry all the time\par He still has the neck pain but is overall better then in the past.  He recently started having right sciatic pain but he notices it appears to come and go with the weather.\par

## 2021-05-20 NOTE — DISCUSSION/SUMMARY
Patient Name: Dash Man  Specialist or PCP: lily  Symptoms: hand pain  Best Availability:   Callback Number: [phone]    Thank you,  Mayra Amor     [FreeTextEntry1] : Mr. Lomeli is a 71yo man with right MCA stroke July 2019 here for follow up for this and cervical radiculopathy and stenosis.  His neck issues have improved with PT but not resolved.\par 1. Continue aspirin 325mg QD and atrovastatin\par 2. Exercise and diet\par 3. Continue celexa 10mg QD \par 4. Diclofenac gel applied to right neck PRN\par 5. Pain management referral\par

## 2021-05-21 ENCOUNTER — APPOINTMENT (OUTPATIENT)
Dept: CARDIOLOGY | Facility: CLINIC | Age: 71
End: 2021-05-21
Payer: MEDICAID

## 2021-05-21 PROCEDURE — 93970 EXTREMITY STUDY: CPT

## 2021-06-03 ENCOUNTER — APPOINTMENT (OUTPATIENT)
Dept: CARDIOLOGY | Facility: CLINIC | Age: 71
End: 2021-06-03
Payer: MEDICAID

## 2021-06-03 VITALS
WEIGHT: 189 LBS | TEMPERATURE: 98.2 F | RESPIRATION RATE: 16 BRPM | OXYGEN SATURATION: 97 % | HEART RATE: 59 BPM | DIASTOLIC BLOOD PRESSURE: 70 MMHG | SYSTOLIC BLOOD PRESSURE: 100 MMHG | HEIGHT: 66 IN | BODY MASS INDEX: 30.37 KG/M2

## 2021-06-03 PROCEDURE — 99214 OFFICE O/P EST MOD 30 MIN: CPT

## 2021-06-03 RX ORDER — DICLOFENAC SODIUM 10 MG/G
1 GEL TOPICAL DAILY
Refills: 0 | Status: DISCONTINUED | COMMUNITY
End: 2021-06-03

## 2021-06-03 NOTE — PHYSICAL EXAM
[Normal Appearance] : normal appearance [General Appearance - Well Developed] : well developed [Well Groomed] : well groomed [No Deformities] : no deformities [General Appearance - Well Nourished] : well nourished [General Appearance - In No Acute Distress] : no acute distress [Normal Conjunctiva] : the conjunctiva exhibited no abnormalities [Eyelids - No Xanthelasma] : the eyelids demonstrated no xanthelasmas [Normal Oral Mucosa] : normal oral mucosa [No Oral Pallor] : no oral pallor [No Oral Cyanosis] : no oral cyanosis [Normal Jugular Venous A Waves Present] : normal jugular venous A waves present [Normal Jugular Venous V Waves Present] : normal jugular venous V waves present [No Jugular Venous Bello A Waves] : no jugular venous bello A waves [Respiration, Rhythm And Depth] : normal respiratory rhythm and effort [Exaggerated Use Of Accessory Muscles For Inspiration] : no accessory muscle use [Auscultation Breath Sounds / Voice Sounds] : lungs were clear to auscultation bilaterally [Heart Rate And Rhythm] : heart rate and rhythm were normal [Heart Sounds] : normal S1 and S2 [Murmurs] : no murmurs present [Arterial Pulses Normal] : the arterial pulses were normal [Edema] : no peripheral edema present [Abdomen Soft] : soft [Abdomen Tenderness] : non-tender [Abdomen Mass (___ Cm)] : no abdominal mass palpated [Abnormal Walk] : normal gait [Gait - Sufficient For Exercise Testing] : the gait was sufficient for exercise testing [Nail Clubbing] : no clubbing of the fingernails [Cyanosis, Localized] : no localized cyanosis [Petechial Hemorrhages (___cm)] : no petechial hemorrhages [Skin Color & Pigmentation] : normal skin color and pigmentation [] : no rash [No Venous Stasis] : no venous stasis [Skin Lesions] : no skin lesions [No Skin Ulcers] : no skin ulcer [No Xanthoma] : no  xanthoma was observed [Oriented To Time, Place, And Person] : oriented to person, place, and time [Affect] : the affect was normal [Mood] : the mood was normal [No Anxiety] : not feeling anxious

## 2021-06-03 NOTE — DISCUSSION/SUMMARY
[With Me] : with me [___ Month(s)] : in [unfilled] month(s) [FreeTextEntry1] : Recent lab results were reviewed.\par PVR results were reviewed.\par Reviewed venous duplex studies of lower extremities\par Arterial duplex results were reviewed.\par \par \par Fasting CBC, BMP, LFTs, Lipid Profile, HgbA1c, CRP, UA, Urine Microalbumin, Mg, Ca, TSH, T3, T4 prior to next visit\par f/u PMD\par f/u 6 months\par \par Number/complexity of problems -   Mod - 2 or more stable chronic illnesses\par Amount/complexity of data -history, exam, reviewed old note, labs reviewed, ekg reviewed, PVR reviewed, venous duplex reviewed, arterial duplex reviewed\par Risk of complications - Low\par

## 2021-06-03 NOTE — REASON FOR VISIT
[Follow-Up - Clinic] : a clinic follow-up of [Ventricular Tachycardia] : ventricular tachycardia [Symptom and Test Evaluation] : symptom and test evaluation [Coronary Artery Disease] : coronary artery disease [Carotid, Aortic and Peripheral Vascular Disease] : carotid, aortic and peripheral vascular disease

## 2021-06-03 NOTE — HISTORY OF PRESENT ILLNESS
[FreeTextEntry1] : 70 year old male came in today for evaluation of a CVA 7 months ago, and a blood clot in the leg 1 year ago. The patient had a thrombectomy of the right middle cerebral artery at that time. The patient had a CABG 12 years ago in [place] and a stoke with paralysis of left side 13 years ago.\par $The patient denies chest pains, shortness of breath, palpitations, dizziness, loss of consciousness, or swelling of the ankles.\par \par The patient never smoked. The patient denies substance abuse. The patient has HTN. The patient denies DM, or high cholesterol. The patient does snore. The patient does not have difficulty sleeping or dozing off during the day.\par \par The patient complains of left calf pain when he walks about 15 min.  Sometimes he also has it on the right.  It only happens with walking.\par \par PMHX:\par \par CVA - right middle cerebral artery thrombectomy - 7/2019 - stable\par HTN - controlled\par h/o CAD - s/p CABG around 2010 - stable\par CVA with parlysis - around 2007 - stable\par NSVT - stable\par Ischemic heart disease -stable\par s/p CABG - 3 vessel - 12 years ago in [place] - stable\par Venous thrombosis - 1/2019 - stable\par Leg pain - stable\par \par \par Medications reviewed and reconciled with patient. Patient is compliant with taking appropriate medications at appropriate doses at appropriate intervals without missing doses.

## 2021-06-03 NOTE — ASSESSMENT
[FreeTextEntry1] : CVA - right middle cerebral artery thrombectomy - 7/2019 - stable\par HTN - controlled\par h/o CAD - s/p CABG around 2010 - stable\par CVA with parlysis - around 2007 - stable\par NSVT - stable\par Ischemic heart disease -stable\par s/p CABG - 3 vessel - 12 years ago in [place] - stable\par Venous thrombosis - 1/2019 - stable\par Leg pain - stable

## 2021-10-07 ENCOUNTER — RX RENEWAL (OUTPATIENT)
Age: 71
End: 2021-10-07

## 2021-12-09 ENCOUNTER — APPOINTMENT (OUTPATIENT)
Dept: CARDIOLOGY | Facility: CLINIC | Age: 71
End: 2021-12-09
Payer: MEDICAID

## 2021-12-09 VITALS
SYSTOLIC BLOOD PRESSURE: 117 MMHG | HEART RATE: 50 BPM | HEIGHT: 66 IN | BODY MASS INDEX: 31.66 KG/M2 | WEIGHT: 197 LBS | OXYGEN SATURATION: 97 % | TEMPERATURE: 98.1 F | DIASTOLIC BLOOD PRESSURE: 76 MMHG | RESPIRATION RATE: 16 BRPM

## 2021-12-09 PROCEDURE — 93000 ELECTROCARDIOGRAM COMPLETE: CPT

## 2021-12-09 PROCEDURE — 99213 OFFICE O/P EST LOW 20 MIN: CPT

## 2021-12-09 RX ORDER — ASPIRIN 325 MG/1
325 TABLET ORAL DAILY
Refills: 0 | Status: DISCONTINUED | COMMUNITY
End: 2021-12-09

## 2021-12-09 NOTE — DISCUSSION/SUMMARY
[With Me] : with me [___ Year(s)] : in [unfilled] year(s) [FreeTextEntry1] : Last visit note reviewed.\par Ekg results were reviewed.\par Recent lab results were reviewed.\par \par \par \par Fasting CBC, BMP, LFTs, Lipid Profile, HgbA1c, CRP, UA, Urine Microalbumin, Mg, Ca, TSH, T3, T4 prior to next visit\par f/u PMD\par f/u nephrology for evaluation for CKD\par f/u 1 year\par \par \par Number/complexity of problems - Mod - 2 or more chronic stable illnesses\par Amount/complexity of data -history, exam, reviewed old note, labs reviewed, ekg reviewed\par Risk of complications - Low\par

## 2021-12-09 NOTE — HISTORY OF PRESENT ILLNESS
[FreeTextEntry1] : 71 year old male came in today for evaluation of a CVA 7 months ago, and a blood clot in the leg 1 year ago. The patient had a thrombectomy of the right middle cerebral artery at that time. The patient had a CABG 12 years ago in [place] and a stoke with paralysis of left side 13 years ago.\par The patient denies chest pains, shortness of breath, palpitations, dizziness, loss of consciousness, or swelling of the ankles.\par \par The patient never smoked. The patient denies substance abuse. The patient has HTN. The patient denies DM, or high cholesterol. The patient does snore. The patient does not have difficulty sleeping or dozing off during the day.\par \par The patient complains of left calf pain when he walks about 15 min.  Sometimes he also has it on the right.  It only happens with walking.\par \par PMHX:\par \par CVA - right middle cerebral artery thrombectomy - 7/2019 - stable\par HTN - controlled\par h/o CAD - s/p CABG around 2010 - stable\par CVA with parlysis - around 2007 - stable\par NSVT - stable\par Ischemic heart disease -stable\par s/p CABG - 3 vessel - 12 years ago in [place] - stable\par Venous thrombosis - 1/2019 - stable\par Leg pain - stable\par \par \par Medications reviewed and reconciled with patient. Patient is compliant with taking appropriate medications at appropriate doses at appropriate intervals without missing doses.

## 2021-12-09 NOTE — REASON FOR VISIT
[Symptom and Test Evaluation] : symptom and test evaluation [Carotid, Aortic and Peripheral Vascular Disease] : carotid, aortic and peripheral vascular disease [Follow-Up - Clinic] : a clinic follow-up of [Coronary Artery Disease] : coronary artery disease [Ventricular Tachycardia] : ventricular tachycardia

## 2021-12-09 NOTE — PHYSICAL EXAM
[General Appearance - Well Developed] : well developed [Normal Appearance] : normal appearance [Well Groomed] : well groomed [General Appearance - Well Nourished] : well nourished [No Deformities] : no deformities [General Appearance - In No Acute Distress] : no acute distress [Normal Conjunctiva] : the conjunctiva exhibited no abnormalities [Eyelids - No Xanthelasma] : the eyelids demonstrated no xanthelasmas [Normal Oral Mucosa] : normal oral mucosa [No Oral Pallor] : no oral pallor [No Oral Cyanosis] : no oral cyanosis [Normal Jugular Venous A Waves Present] : normal jugular venous A waves present [Normal Jugular Venous V Waves Present] : normal jugular venous V waves present [No Jugular Venous Bello A Waves] : no jugular venous bello A waves [Respiration, Rhythm And Depth] : normal respiratory rhythm and effort [Exaggerated Use Of Accessory Muscles For Inspiration] : no accessory muscle use [Auscultation Breath Sounds / Voice Sounds] : lungs were clear to auscultation bilaterally [Heart Rate And Rhythm] : heart rate and rhythm were normal [Heart Sounds] : normal S1 and S2 [Murmurs] : no murmurs present [Arterial Pulses Normal] : the arterial pulses were normal [Edema] : no peripheral edema present [Abdomen Soft] : soft [Abdomen Tenderness] : non-tender [Abdomen Mass (___ Cm)] : no abdominal mass palpated [Abnormal Walk] : normal gait [Gait - Sufficient For Exercise Testing] : the gait was sufficient for exercise testing [Nail Clubbing] : no clubbing of the fingernails [Cyanosis, Localized] : no localized cyanosis [Petechial Hemorrhages (___cm)] : no petechial hemorrhages [Skin Color & Pigmentation] : normal skin color and pigmentation [] : no rash [No Venous Stasis] : no venous stasis [Skin Lesions] : no skin lesions [No Skin Ulcers] : no skin ulcer [No Xanthoma] : no  xanthoma was observed [Oriented To Time, Place, And Person] : oriented to person, place, and time [Affect] : the affect was normal [Mood] : the mood was normal [No Anxiety] : not feeling anxious

## 2022-01-06 ENCOUNTER — APPOINTMENT (OUTPATIENT)
Dept: NEUROLOGY | Facility: CLINIC | Age: 72
End: 2022-01-06
Payer: MEDICAID

## 2022-01-06 ENCOUNTER — APPOINTMENT (OUTPATIENT)
Dept: NEUROLOGY | Facility: CLINIC | Age: 72
End: 2022-01-06

## 2022-01-06 PROCEDURE — 99213 OFFICE O/P EST LOW 20 MIN: CPT | Mod: 95

## 2022-01-06 NOTE — HISTORY OF PRESENT ILLNESS
[FreeTextEntry1] : Silverio is a 70yo man here for follow up of his stroke and cervical radiculopathy/stenosis.  Since last visit he had MRI cervical spine showing moderate spinal stenosis and multilevel radicular disease. He started physical therapy and was doing 2 sessions per week and had a noticeable improvement in his pain and neck mobility.  \par \par He continues to get daily right neck pain and say he does some exercises at home.  He is also having more difficulty with sleep.  Also family saying his short term memory appears to be getting worse.

## 2022-01-06 NOTE — DISCUSSION/SUMMARY
[FreeTextEntry1] : Mr. 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  His neck issues did improve with PT but currently has worsened with just home exercises.\par 1. Continue aspirin 325mg QD and atrovastatin\par 2. Exercise and diet\par 3. Continue celexa 10mg QD \par 4. Diclofenac gel applied to right neck PRN\par 5. Pain management referral and PT referral\par

## 2022-01-06 NOTE — REASON FOR VISIT
[Home] : at home, [unfilled] , at the time of the visit. [Medical Office: ([facility])___] : at the medical office located in  [Family Member] : family member [Verbal consent obtained from patient] : the patient, [unfilled]

## 2022-01-18 ENCOUNTER — RX RENEWAL (OUTPATIENT)
Age: 72
End: 2022-01-18

## 2022-02-16 ENCOUNTER — APPOINTMENT (OUTPATIENT)
Dept: CARDIOLOGY | Facility: CLINIC | Age: 72
End: 2022-02-16
Payer: MEDICAID

## 2022-02-16 VITALS
HEART RATE: 50 BPM | RESPIRATION RATE: 16 BRPM | TEMPERATURE: 97.2 F | OXYGEN SATURATION: 97 % | HEIGHT: 66 IN | SYSTOLIC BLOOD PRESSURE: 118 MMHG | WEIGHT: 315 LBS | BODY MASS INDEX: 50.62 KG/M2 | DIASTOLIC BLOOD PRESSURE: 60 MMHG

## 2022-02-16 PROCEDURE — 93000 ELECTROCARDIOGRAM COMPLETE: CPT | Mod: 59

## 2022-02-16 PROCEDURE — 99215 OFFICE O/P EST HI 40 MIN: CPT

## 2022-02-16 PROCEDURE — 93246 EXT ECG>7D<15D RECORDING: CPT

## 2022-02-16 PROCEDURE — 99072 ADDL SUPL MATRL&STAF TM PHE: CPT

## 2022-02-16 RX ORDER — MECLIZINE HYDROCHLORIDE 12.5 MG/1
12.5 TABLET ORAL DAILY
Refills: 0 | Status: DISCONTINUED | COMMUNITY
End: 2022-02-16

## 2022-02-16 RX ORDER — ASPIRIN 81 MG
81 TABLET, DELAYED RELEASE (ENTERIC COATED) ORAL DAILY
Refills: 0 | Status: DISCONTINUED | COMMUNITY
End: 2022-02-16

## 2022-02-16 RX ORDER — METOPROLOL TARTRATE 50 MG/1
50 TABLET, FILM COATED ORAL TWICE DAILY
Refills: 0 | Status: DISCONTINUED | COMMUNITY
End: 2022-02-16

## 2022-02-16 RX ORDER — METOPROLOL SUCCINATE 50 MG/1
50 TABLET, EXTENDED RELEASE ORAL DAILY
Refills: 0 | Status: DISCONTINUED | COMMUNITY
End: 2022-02-16

## 2022-02-16 RX ORDER — DICLOFENAC SODIUM 1% 10 MG/G
1 GEL TOPICAL
Qty: 100 | Refills: 0 | Status: DISCONTINUED | COMMUNITY
Start: 2021-05-20 | End: 2022-02-16

## 2022-02-16 RX ORDER — FUROSEMIDE 20 MG/1
20 TABLET ORAL DAILY
Refills: 0 | Status: DISCONTINUED | COMMUNITY
End: 2022-02-16

## 2022-02-16 RX ORDER — IRON PS COMPLEX/B12/FOLIC ACID 150-25-1
CAPSULE ORAL DAILY
Refills: 0 | Status: DISCONTINUED | COMMUNITY
End: 2022-02-16

## 2022-02-16 RX ORDER — MELOXICAM 15 MG/1
15 TABLET ORAL DAILY
Refills: 0 | Status: DISCONTINUED | COMMUNITY
End: 2022-02-16

## 2022-02-16 NOTE — HISTORY OF PRESENT ILLNESS
[FreeTextEntry1] : 71 year old male came in today for evaluation of a CVA 7 months ago, and a blood clot in the leg 1 year ago. The patient had a thrombectomy of the right middle cerebral artery at that time. The patient had a CABG 12 years ago in [place] and a stoke with paralysis of left side 13 years ago.\par The patient denies chest pains, shortness of breath, palpitations, dizziness, loss of consciousness, or swelling of the ankles.\par \par The patient never smoked. The patient denies substance abuse. The patient has HTN. The patient denies DM, or high cholesterol. The patient does snore. The patient does not have difficulty sleeping or dozing off during the day.\par \par The patient complains of left calf pain when he walks about 15 min.  Sometimes he also has it on the right.  It only happens with walking.\par \par The patient had left leg pains 3 weeks ago for left leg pain and was found to be in atrial fibrillation.  The patient today is back in sinus rhythm.  He was started on Eliquis 2.5 mg po bid.\par \par PMHX:\par \par CVA - right middle cerebral artery thrombectomy - 7/2019 - stable\par HTN - controlled\par h/o CAD - s/p CABG around 2010 - stable\par CVA with parlysis - around 2007 - stable\par NSVT - stable\par Ischemic heart disease -stable\par s/p CABG - 3 vessel - 12 years ago in [place] - stable\par Venous thrombosis - 1/2019 - stable\par Leg pain - stable\par Paroxysmal atrial fibrillaiton - CHADSVASC2 - 4 - stable\par \par Medications reviewed and reconciled with patient. Patient is compliant with taking appropriate medications at appropriate doses at appropriate intervals without missing doses.

## 2022-02-16 NOTE — ASSESSMENT
[FreeTextEntry1] : CVA - right middle cerebral artery thrombectomy - 7/2019 - stable\par HTN - controlled\par h/o CAD - s/p CABG around 2010 - stable\par CVA with parlysis - around 2007 - stable\par NSVT - stable\par Ischemic heart disease -stable\par s/p CABG - 3 vessel - 12 years ago in [place] - stable\par Venous thrombosis - 1/2019 - stable\par Leg pain - stable\par Paroxysmal atrial fibrillaiton - CHADSVASC2 - 4 - stable

## 2022-02-16 NOTE — DISCUSSION/SUMMARY
[With Me] : with me [___ Month(s)] : in [unfilled] month(s) [FreeTextEntry1] : Last visit note reviewed.\par Ekg results were reviewed.\par Recent lab results were reviewed.\par \par \par \par \par Increaase  Eliquis to 5 mg po bid\par Switch metoprolol tartrate to EES 50 mg po od\par Echocardiogram - paroxysmal atrial fib\par Holter monitor - 14 days - atrial fibrillation burden\par PVR studies of lower extremities - leg pain\par Venous duplex of lower extremities - leg pain\par Arterial duplex lower extremities - leg pain\par Carotid artery duplex - h/o CVA\par f/u nephrology for evaluation for CKD\par f/u 1 month\par \par \par \par Number/complexity of problems - High - 1 or more chronic illnesses with severe exacerbation, progression or side effects of treatment\par Amount/complexity of data -history, exam, reviewed old note, labs reviewed, ekg reviewed\par Risk of complications - High\par \par

## 2022-02-16 NOTE — PHYSICAL EXAM
[Normal Appearance] : normal appearance [General Appearance - Well Developed] : well developed [Well Groomed] : well groomed [General Appearance - Well Nourished] : well nourished [No Deformities] : no deformities [General Appearance - In No Acute Distress] : no acute distress [Normal Conjunctiva] : the conjunctiva exhibited no abnormalities [Eyelids - No Xanthelasma] : the eyelids demonstrated no xanthelasmas [Normal Oral Mucosa] : normal oral mucosa [No Oral Pallor] : no oral pallor [No Oral Cyanosis] : no oral cyanosis [Normal Jugular Venous A Waves Present] : normal jugular venous A waves present [Normal Jugular Venous V Waves Present] : normal jugular venous V waves present [No Jugular Venous Bello A Waves] : no jugular venous bello A waves [Respiration, Rhythm And Depth] : normal respiratory rhythm and effort [Exaggerated Use Of Accessory Muscles For Inspiration] : no accessory muscle use [Auscultation Breath Sounds / Voice Sounds] : lungs were clear to auscultation bilaterally [Heart Rate And Rhythm] : heart rate and rhythm were normal [Heart Sounds] : normal S1 and S2 [Murmurs] : no murmurs present [Arterial Pulses Normal] : the arterial pulses were normal [Edema] : no peripheral edema present [Abdomen Soft] : soft [Abdomen Tenderness] : non-tender [Abdomen Mass (___ Cm)] : no abdominal mass palpated [Abnormal Walk] : normal gait [Gait - Sufficient For Exercise Testing] : the gait was sufficient for exercise testing [Nail Clubbing] : no clubbing of the fingernails [Cyanosis, Localized] : no localized cyanosis [Petechial Hemorrhages (___cm)] : no petechial hemorrhages [Skin Color & Pigmentation] : normal skin color and pigmentation [] : no rash [No Venous Stasis] : no venous stasis [Skin Lesions] : no skin lesions [No Skin Ulcers] : no skin ulcer [No Xanthoma] : no  xanthoma was observed [Oriented To Time, Place, And Person] : oriented to person, place, and time [Affect] : the affect was normal [Mood] : the mood was normal [No Anxiety] : not feeling anxious

## 2022-03-01 ENCOUNTER — APPOINTMENT (OUTPATIENT)
Dept: CARDIOLOGY | Facility: CLINIC | Age: 72
End: 2022-03-01
Payer: MEDICAID

## 2022-03-01 DIAGNOSIS — G83.9 PARALYTIC SYNDROME, UNSPECIFIED: ICD-10-CM

## 2022-03-01 PROCEDURE — 93880 EXTRACRANIAL BILAT STUDY: CPT

## 2022-03-01 PROCEDURE — 93306 TTE W/DOPPLER COMPLETE: CPT

## 2022-03-03 ENCOUNTER — APPOINTMENT (OUTPATIENT)
Dept: CARDIOLOGY | Facility: CLINIC | Age: 72
End: 2022-03-03
Payer: MEDICAID

## 2022-03-03 PROCEDURE — 93925 LOWER EXTREMITY STUDY: CPT

## 2022-03-03 PROCEDURE — 93970 EXTREMITY STUDY: CPT

## 2022-03-03 PROCEDURE — 99072 ADDL SUPL MATRL&STAF TM PHE: CPT

## 2022-03-23 ENCOUNTER — APPOINTMENT (OUTPATIENT)
Dept: CARDIOLOGY | Facility: CLINIC | Age: 72
End: 2022-03-23
Payer: MEDICAID

## 2022-03-23 VITALS
BODY MASS INDEX: 31.34 KG/M2 | WEIGHT: 195 LBS | HEART RATE: 53 BPM | OXYGEN SATURATION: 96 % | SYSTOLIC BLOOD PRESSURE: 120 MMHG | DIASTOLIC BLOOD PRESSURE: 60 MMHG | TEMPERATURE: 98.6 F | HEIGHT: 66 IN

## 2022-03-23 PROCEDURE — 99205 OFFICE O/P NEW HI 60 MIN: CPT

## 2022-03-23 PROCEDURE — 93000 ELECTROCARDIOGRAM COMPLETE: CPT

## 2022-03-28 ENCOUNTER — OUTPATIENT (OUTPATIENT)
Dept: OUTPATIENT SERVICES | Facility: HOSPITAL | Age: 72
LOS: 1 days | Discharge: HOME | End: 2022-03-28
Payer: MEDICAID

## 2022-03-28 VITALS
RESPIRATION RATE: 18 BRPM | HEART RATE: 56 BPM | TEMPERATURE: 97 F | HEIGHT: 67.72 IN | DIASTOLIC BLOOD PRESSURE: 63 MMHG | WEIGHT: 190.92 LBS | SYSTOLIC BLOOD PRESSURE: 138 MMHG | OXYGEN SATURATION: 97 %

## 2022-03-28 DIAGNOSIS — R00.1 BRADYCARDIA, UNSPECIFIED: ICD-10-CM

## 2022-03-28 DIAGNOSIS — Z01.818 ENCOUNTER FOR OTHER PREPROCEDURAL EXAMINATION: ICD-10-CM

## 2022-03-28 DIAGNOSIS — Z95.1 PRESENCE OF AORTOCORONARY BYPASS GRAFT: Chronic | ICD-10-CM

## 2022-03-28 DIAGNOSIS — Z98.890 OTHER SPECIFIED POSTPROCEDURAL STATES: Chronic | ICD-10-CM

## 2022-03-28 LAB
ALBUMIN SERPL ELPH-MCNC: 3.9 G/DL — SIGNIFICANT CHANGE UP (ref 3.5–5.2)
ALP SERPL-CCNC: 111 U/L — SIGNIFICANT CHANGE UP (ref 30–115)
ALT FLD-CCNC: 14 U/L — SIGNIFICANT CHANGE UP (ref 0–41)
ANION GAP SERPL CALC-SCNC: 14 MMOL/L — SIGNIFICANT CHANGE UP (ref 7–14)
APPEARANCE UR: CLEAR — SIGNIFICANT CHANGE UP
APTT BLD: 35.4 SEC — SIGNIFICANT CHANGE UP (ref 27–39.2)
AST SERPL-CCNC: 14 U/L — SIGNIFICANT CHANGE UP (ref 0–41)
BASOPHILS # BLD AUTO: 0.05 K/UL — SIGNIFICANT CHANGE UP (ref 0–0.2)
BASOPHILS NFR BLD AUTO: 0.6 % — SIGNIFICANT CHANGE UP (ref 0–1)
BILIRUB SERPL-MCNC: 0.3 MG/DL — SIGNIFICANT CHANGE UP (ref 0.2–1.2)
BILIRUB UR-MCNC: NEGATIVE — SIGNIFICANT CHANGE UP
BUN SERPL-MCNC: 28 MG/DL — HIGH (ref 10–20)
CALCIUM SERPL-MCNC: 9.4 MG/DL — SIGNIFICANT CHANGE UP (ref 8.5–10.1)
CHLORIDE SERPL-SCNC: 102 MMOL/L — SIGNIFICANT CHANGE UP (ref 98–110)
CO2 SERPL-SCNC: 25 MMOL/L — SIGNIFICANT CHANGE UP (ref 17–32)
COLOR SPEC: SIGNIFICANT CHANGE UP
CREAT SERPL-MCNC: 1.3 MG/DL — SIGNIFICANT CHANGE UP (ref 0.7–1.5)
DIFF PNL FLD: NEGATIVE — SIGNIFICANT CHANGE UP
EGFR: 58 ML/MIN/1.73M2 — LOW
EOSINOPHIL # BLD AUTO: 0.61 K/UL — SIGNIFICANT CHANGE UP (ref 0–0.7)
EOSINOPHIL NFR BLD AUTO: 7.7 % — SIGNIFICANT CHANGE UP (ref 0–8)
GLUCOSE SERPL-MCNC: 123 MG/DL — HIGH (ref 70–99)
GLUCOSE UR QL: NEGATIVE — SIGNIFICANT CHANGE UP
HCT VFR BLD CALC: 31.2 % — LOW (ref 42–52)
HGB BLD-MCNC: 9.3 G/DL — LOW (ref 14–18)
IMM GRANULOCYTES NFR BLD AUTO: 0.3 % — SIGNIFICANT CHANGE UP (ref 0.1–0.3)
INR BLD: 1.5 RATIO — HIGH (ref 0.65–1.3)
KETONES UR-MCNC: NEGATIVE — SIGNIFICANT CHANGE UP
LEUKOCYTE ESTERASE UR-ACNC: NEGATIVE — SIGNIFICANT CHANGE UP
LYMPHOCYTES # BLD AUTO: 2.44 K/UL — SIGNIFICANT CHANGE UP (ref 1.2–3.4)
LYMPHOCYTES # BLD AUTO: 30.8 % — SIGNIFICANT CHANGE UP (ref 20.5–51.1)
MCHC RBC-ENTMCNC: 24 PG — LOW (ref 27–31)
MCHC RBC-ENTMCNC: 29.8 G/DL — LOW (ref 32–37)
MCV RBC AUTO: 80.4 FL — SIGNIFICANT CHANGE UP (ref 80–94)
MONOCYTES # BLD AUTO: 1.14 K/UL — HIGH (ref 0.1–0.6)
MONOCYTES NFR BLD AUTO: 14.4 % — HIGH (ref 1.7–9.3)
MRSA PCR RESULT.: NEGATIVE — SIGNIFICANT CHANGE UP
NEUTROPHILS # BLD AUTO: 3.67 K/UL — SIGNIFICANT CHANGE UP (ref 1.4–6.5)
NEUTROPHILS NFR BLD AUTO: 46.2 % — SIGNIFICANT CHANGE UP (ref 42.2–75.2)
NITRITE UR-MCNC: NEGATIVE — SIGNIFICANT CHANGE UP
NRBC # BLD: 0 /100 WBCS — SIGNIFICANT CHANGE UP (ref 0–0)
PH UR: 6.5 — SIGNIFICANT CHANGE UP (ref 5–8)
PLATELET # BLD AUTO: 299 K/UL — SIGNIFICANT CHANGE UP (ref 130–400)
POTASSIUM SERPL-MCNC: 4.4 MMOL/L — SIGNIFICANT CHANGE UP (ref 3.5–5)
POTASSIUM SERPL-SCNC: 4.4 MMOL/L — SIGNIFICANT CHANGE UP (ref 3.5–5)
PROT SERPL-MCNC: 6.9 G/DL — SIGNIFICANT CHANGE UP (ref 6–8)
PROT UR-MCNC: SIGNIFICANT CHANGE UP
PROTHROM AB SERPL-ACNC: 17.2 SEC — HIGH (ref 9.95–12.87)
RBC # BLD: 3.88 M/UL — LOW (ref 4.7–6.1)
RBC # FLD: 14.4 % — SIGNIFICANT CHANGE UP (ref 11.5–14.5)
SODIUM SERPL-SCNC: 141 MMOL/L — SIGNIFICANT CHANGE UP (ref 135–146)
SP GR SPEC: 1.02 — SIGNIFICANT CHANGE UP (ref 1.01–1.03)
UROBILINOGEN FLD QL: SIGNIFICANT CHANGE UP
WBC # BLD: 7.93 K/UL — SIGNIFICANT CHANGE UP (ref 4.8–10.8)
WBC # FLD AUTO: 7.93 K/UL — SIGNIFICANT CHANGE UP (ref 4.8–10.8)

## 2022-03-28 PROCEDURE — 93010 ELECTROCARDIOGRAM REPORT: CPT

## 2022-03-28 NOTE — H&P PST ADULT - NSICDXPASTSURGICALHX_GEN_ALL_CORE_FT
PAST SURGICAL HISTORY:  History of hernia repair     History of surgery THROMBECTOMY    S/P CABG (coronary artery bypass graft)

## 2022-03-28 NOTE — H&P PST ADULT - HISTORY OF PRESENT ILLNESS
CURRENTLY  DENIES ANY CP, SOB, PALPITATIONS, COUGH OR DYSURIA- HAS INTERMITTENT SHORTNESS OF BREATH   EXERCISE TOLERANCE 1-2 FOS WITHOUT SOB    AS PER PATIENT  this is his/her complete medical history including medications - PRESCRIPTIONS  OVER THE COUNTER MEDS    pt denies any covid s/s, or tested positive in the past.  Received covid vaccine  pt advised self quarantine till day of procedure    Anesthesia Alert  NO--Difficult Airway  NO--History of neck surgery or radiation  YES--Limited ROM of neck  NO--History of Malignant hyperthermia  NO--No personal or family history of Pseudocholinesterase deficiency.  NO--Prior Anesthesia Complication  NO--Latex Allergy  NO--Loose teeth  YES? --History of Rheumatoid Arthritis- LEFT LEG  NO--CLINTON  YES--Bleeding risk ON ELIQUIS  NO--Other_____    Patient verbalized understanding of instructions and was given the opportunity to ask questions and have them answered.

## 2022-03-28 NOTE — H&P PST ADULT - NSANTHOSAYNRD_GEN_A_CORE
No. CLINTON screening performed.  STOP BANG Legend: 0-2 = LOW Risk; 3-4 = INTERMEDIATE Risk; 5-8 = HIGH Risk

## 2022-03-28 NOTE — H&P PST ADULT - NSICDXPASTMEDICALHX_GEN_ALL_CORE_FT
PAST MEDICAL HISTORY:  Afib     CAD (coronary artery disease) s/p stenting and CABG 10 years ago    DVT, lower extremity January 2019, not currently on anticoagulation    HLD (hyperlipidemia)     Stroke 10 years ago as per MD (2006, 2009, 2019- HAS FORGETFULNESS AND MOOD CHANGES SINCE LAST STROKE)

## 2022-03-29 ENCOUNTER — APPOINTMENT (OUTPATIENT)
Dept: CARDIOLOGY | Facility: CLINIC | Age: 72
End: 2022-03-29
Payer: COMMERCIAL

## 2022-03-29 VITALS
HEIGHT: 66 IN | SYSTOLIC BLOOD PRESSURE: 120 MMHG | OXYGEN SATURATION: 95 % | TEMPERATURE: 97.2 F | WEIGHT: 198 LBS | DIASTOLIC BLOOD PRESSURE: 80 MMHG | HEART RATE: 53 BPM | BODY MASS INDEX: 31.82 KG/M2 | RESPIRATION RATE: 16 BRPM

## 2022-03-29 DIAGNOSIS — I46.9 CARDIAC ARREST, CAUSE UNSPECIFIED: ICD-10-CM

## 2022-03-29 DIAGNOSIS — M79.606 PAIN IN LEG, UNSPECIFIED: ICD-10-CM

## 2022-03-29 LAB
CULTURE RESULTS: SIGNIFICANT CHANGE UP
SPECIMEN SOURCE: SIGNIFICANT CHANGE UP

## 2022-03-29 PROCEDURE — 99215 OFFICE O/P EST HI 40 MIN: CPT

## 2022-03-29 PROCEDURE — 93000 ELECTROCARDIOGRAM COMPLETE: CPT

## 2022-03-29 PROCEDURE — 99072 ADDL SUPL MATRL&STAF TM PHE: CPT

## 2022-03-29 NOTE — DISCUSSION/SUMMARY
[With Me] : with me [FreeTextEntry3] : Post PPM insertion [FreeTextEntry1] : Last visit note reviewed.\par Ekg results were reviewed.\par Recent lab results were reviewed.\par Echocardiogram results were reviewed.\par Carotid artery duplex results were reviewed.\par Holter monitor results were reviewed.\par Arterial duplex results were reviewed.\par Reviewed venous duplex studies of lower extremities\par \par PPM insertion - Dr. Moore\par Fasting CBC, BMP, LFTs, Lipid Profile, HgbA1c, CRP, UA, Urine Microalbumin, Mg, Ca, TSH, T3, T4 prior to next visit\par PT/PTT/INR\par COVID-19 PCR within 3 days of procedure\par f/u nephrology for evaluation for CKD\par f/u post PPM insertion\par \par \par \par Number/complexity of problems - High - 1 or more chronic illnesses with severe exacerbation, progression or side effects of treatment\par Amount/complexity of data -history, exam, reviewed old note, labs reviewed, ekg reviewed\par Risk of complications - High\par \par

## 2022-03-31 ENCOUNTER — LABORATORY RESULT (OUTPATIENT)
Age: 72
End: 2022-03-31

## 2022-04-01 LAB
ALBUMIN SERPL ELPH-MCNC: 4.2 G/DL
ALP BLD-CCNC: 105 U/L
ALT SERPL-CCNC: 13 U/L
ANION GAP SERPL CALC-SCNC: 16 MMOL/L
APTT BLD: 34.5 SEC
AST SERPL-CCNC: 16 U/L
BASOPHILS # BLD AUTO: 0.07 K/UL
BASOPHILS NFR BLD AUTO: 0.9 %
BILIRUB DIRECT SERPL-MCNC: 0.1 MG/DL
BILIRUB INDIRECT SERPL-MCNC: 0.3 MG/DL
BILIRUB SERPL-MCNC: 0.4 MG/DL
BUN SERPL-MCNC: 26 MG/DL
CALCIUM SERPL-MCNC: 9.3 MG/DL
CHLORIDE SERPL-SCNC: 102 MMOL/L
CHOLEST SERPL-MCNC: 141 MG/DL
CO2 SERPL-SCNC: 25 MMOL/L
CREAT SERPL-MCNC: 1.35 MG/DL
CREAT SPEC-SCNC: 186 MG/DL
CRP SERPL HS-MCNC: 3.65 MG/L
EGFR: 55 ML/MIN/1.73M2
EOSINOPHIL # BLD AUTO: 0.28 K/UL
EOSINOPHIL NFR BLD AUTO: 3.6 %
ESTIMATED AVERAGE GLUCOSE: 146 MG/DL
GLUCOSE SERPL-MCNC: 97 MG/DL
HBA1C MFR BLD HPLC: 6.7 %
HCT VFR BLD CALC: 33.3 %
HDLC SERPL-MCNC: 54 MG/DL
HGB BLD-MCNC: 9.2 G/DL
INR PPP: 1.66 RATIO
LDLC SERPL CALC-MCNC: 76 MG/DL
LYMPHOCYTES # BLD AUTO: 1.97 K/UL
LYMPHOCYTES NFR BLD AUTO: 25 %
MAGNESIUM SERPL-MCNC: 2 MG/DL
MAN DIFF?: NORMAL
MCHC RBC-ENTMCNC: 23.5 PG
MCHC RBC-ENTMCNC: 27.6 GM/DL
MCV RBC AUTO: 85.2 FL
MICROALBUMIN 24H UR DL<=1MG/L-MCNC: <1.2 MG/DL
MICROALBUMIN/CREAT 24H UR-RTO: NORMAL MG/G
MONOCYTES # BLD AUTO: 0.91 K/UL
MONOCYTES NFR BLD AUTO: 11.6 %
NEUTROPHILS # BLD AUTO: 4.64 K/UL
NEUTROPHILS NFR BLD AUTO: 58.9 %
NONHDLC SERPL-MCNC: 87 MG/DL
PLATELET # BLD AUTO: 293 K/UL
POTASSIUM SERPL-SCNC: 3.9 MMOL/L
PROT SERPL-MCNC: 7 G/DL
PT BLD: 19.3 SEC
RBC # BLD: 3.91 M/UL
RBC # FLD: 14.8 %
SODIUM SERPL-SCNC: 142 MMOL/L
T3 SERPL-MCNC: 107 NG/DL
T4 SERPL-MCNC: 7 UG/DL
TRIGL SERPL-MCNC: 56 MG/DL
TSH SERPL-ACNC: 1.27 UIU/ML
WBC # FLD AUTO: 7.87 K/UL

## 2022-04-05 ENCOUNTER — INPATIENT (INPATIENT)
Facility: HOSPITAL | Age: 72
LOS: 0 days | Discharge: HOME | End: 2022-04-06
Attending: INTERNAL MEDICINE | Admitting: STUDENT IN AN ORGANIZED HEALTH CARE EDUCATION/TRAINING PROGRAM
Payer: MEDICAID

## 2022-04-05 VITALS
HEIGHT: 67.72 IN | HEART RATE: 61 BPM | RESPIRATION RATE: 18 BRPM | SYSTOLIC BLOOD PRESSURE: 132 MMHG | DIASTOLIC BLOOD PRESSURE: 72 MMHG | WEIGHT: 188.05 LBS

## 2022-04-05 DIAGNOSIS — Z98.890 OTHER SPECIFIED POSTPROCEDURAL STATES: Chronic | ICD-10-CM

## 2022-04-05 DIAGNOSIS — Z95.1 PRESENCE OF AORTOCORONARY BYPASS GRAFT: Chronic | ICD-10-CM

## 2022-04-05 DIAGNOSIS — R00.1 BRADYCARDIA, UNSPECIFIED: ICD-10-CM

## 2022-04-05 PROCEDURE — 33208 INSRT HEART PM ATRIAL & VENT: CPT

## 2022-04-05 RX ORDER — APIXABAN 2.5 MG/1
5 TABLET, FILM COATED ORAL EVERY 12 HOURS
Refills: 0 | Status: DISCONTINUED | OUTPATIENT
Start: 2022-04-06 | End: 2022-04-06

## 2022-04-05 RX ORDER — CEFAZOLIN SODIUM 1 G
2000 VIAL (EA) INJECTION ONCE
Refills: 0 | Status: COMPLETED | OUTPATIENT
Start: 2022-04-05 | End: 2022-04-05

## 2022-04-05 RX ORDER — CEFAZOLIN SODIUM 1 G
1000 VIAL (EA) INJECTION EVERY 8 HOURS
Refills: 0 | Status: COMPLETED | OUTPATIENT
Start: 2022-04-05 | End: 2022-04-06

## 2022-04-05 RX ORDER — METOPROLOL TARTRATE 50 MG
50 TABLET ORAL DAILY
Refills: 0 | Status: DISCONTINUED | OUTPATIENT
Start: 2022-04-05 | End: 2022-04-06

## 2022-04-05 RX ORDER — ATORVASTATIN CALCIUM 80 MG/1
20 TABLET, FILM COATED ORAL AT BEDTIME
Refills: 0 | Status: DISCONTINUED | OUTPATIENT
Start: 2022-04-05 | End: 2022-04-06

## 2022-04-05 RX ORDER — ACETAMINOPHEN 500 MG
650 TABLET ORAL EVERY 6 HOURS
Refills: 0 | Status: DISCONTINUED | OUTPATIENT
Start: 2022-04-05 | End: 2022-04-06

## 2022-04-05 RX ORDER — CEFAZOLIN SODIUM 1 G
1000 VIAL (EA) INJECTION ONCE
Refills: 0 | Status: COMPLETED | OUTPATIENT
Start: 2022-04-05 | End: 2022-04-05

## 2022-04-05 RX ADMIN — Medication 100 MILLIGRAM(S): at 17:51

## 2022-04-05 RX ADMIN — Medication 100 MILLIGRAM(S): at 09:10

## 2022-04-05 RX ADMIN — Medication 100 MILLIGRAM(S): at 09:55

## 2022-04-05 RX ADMIN — ATORVASTATIN CALCIUM 20 MILLIGRAM(S): 80 TABLET, FILM COATED ORAL at 21:34

## 2022-04-05 RX ADMIN — Medication 10 MILLIGRAM(S): at 17:52

## 2022-04-05 NOTE — PATIENT PROFILE ADULT - FALL HARM RISK - HARM RISK INTERVENTIONS
no
Communicate Risk of Fall with Harm to all staff/Reinforce activity limits and safety measures with patient and family/Tailored Fall Risk Interventions/Visual Cue: Yellow wristband and red socks/Bed in lowest position, wheels locked, appropriate side rails in place/Call bell, personal items and telephone in reach/Instruct patient to call for assistance before getting out of bed or chair/Non-slip footwear when patient is out of bed/[place] to call system/Physically safe environment - no spills, clutter or unnecessary equipment/Purposeful Proactive Rounding/Room/bathroom lighting operational, light cord in reach

## 2022-04-05 NOTE — PRE-ANESTHESIA EVALUATION ADULT - NSANTHTOBACCOSD_GEN_ALL_CORE
No Terbinafine Pregnancy And Lactation Text: This medication is Pregnancy Category B and is considered safe during pregnancy. It is also excreted in breast milk and breast feeding isn't recommended.

## 2022-04-05 NOTE — CHART NOTE - NSCHARTNOTEFT_GEN_A_CORE
ELECTROPHYSIOLOGY PROCEDURE:  Dual Chamber Pacemaker Implantation  IMPRESSION:  Successful dual chamber pacemaker implant (Medtronic, Non-dependent).  PLAN:   - Overnight telemetry monitoring  - Overnight prophylactic pressure dressing.  - CXR and Interrogation in am  - Continue Eliquis tonight.  - Anticipate DC from EP standpoint in am if stable.    : Ildefonso Moore M.D.  INDICATION FOR PROCEDURE: Intermittent Complete heart block, Severe 1st degree AV block    CONSENT:   The risks, benefits, indications and alternatives to the procedure were explained in detail to the patient and available family members prior to the procedure. The patient and available family members expressed a good understanding of the procedure and risks. All questions asked were answered and consent was obtained. A representative from the device company was present to provide clinical support.    SEDATION:   The patient was transported to the Electrophysiology Laboratory in a non-sedated state and was placed supine on the fluoroscopy table. Sedation was provided by anesthesia team. The patient underwent continuous blood pressure, heart rate and O2 saturation monitoring throughout the procedure with supplemental oxygen utilized as needed.    DETAILS OF PROCEDURE:    Informed consent was obtained, and the patient was brought to the EP lab in a fasting state. The patient  was prepped and draped in the usual strict sterile fashion.   After the antibiotic was completely infused, 50 cc lidocaine was infiltrated into the area just medial to the left deltopectoral groove. Using a #10 scalpel, an incision was made. The incision was extended to the prepectoral fascia using blunt dissection. Then left extra-thoracic axillary vein was accessed twice under fluoroscopy and venogram. 2 guidewires were placed into the vein. The guidewires were followed down to the IVC to confirm venous access and secured in place. Then the sheaths were placed over the guidewire.     Then the ventricular lead was advanced into the RV. The RV lead was fixated to the right ventricular mid septum. Appropriate sensing and thresholds were obtained. No diaphragmatic pacing occurred at 10 V and 1.5 ms. The lead position was confirmed in the [place] and [place]AG[place] projections. 1 repositioning was needed due to suboptimal sensing.    Then the atrial lead was advanced into the RA. The RA lead was fixated to the right atrial appendage. Appropriate sensing was obtained. Threshold was not assessed due to persistent AF. No diaphragmatic pacing occurred at 10 V and 1.5 ms. The lead position was confirmed in the [place] and [place] projections. 2 repositionings were needed due to lack of stability and poor AF sensing.    The sheaths were removed. The leads were then secured to the pectoralis muscle using Ethibond. Lead measurements were then rechecked.    Then the prepectoral pocket was fashioned. The leads were attached to the generator. The system was placed in the pocket and connected to the leads. The generator was sutured to the pocket. The generator and leads system were visualized under fluoroscopy. Appropriate redundancy/slack in the leads were noted. The pins of the leads were beyond the set screws.    Hemostasis was reverified. The pocket was then closed in two layers. Dermabond and a sterile dressing were placed.   The patient was transferred to the pre-post room in stable condition.    EQUIPMENT IMPLANTED AND BASELINE PARAMETERS  Pulse Generator   : Accuri Cytometers  Model Number:	[id number]  Serial Number:	[id number]       Atrial Lead:  Model Number:	5076  Serial Number:	[id number]  Threshold:	NA- patient in AF.  Sensin.6 mV  Impedance:	881 Ohms  						  Right Ventricular Lead:  Model Number:	5076  Serial Number:	[id number]  Threshold:	0.5 V at 0.3 ms  Sensin.8  mV  Impedance:	878 Ohms      EBL: 5 cc  Complication: None    IMPRESSION:  Successful dual chamber pacemaker implant (Medtronic, Non-dependent).

## 2022-04-06 ENCOUNTER — TRANSCRIPTION ENCOUNTER (OUTPATIENT)
Age: 72
End: 2022-04-06

## 2022-04-06 VITALS
WEIGHT: 185.85 LBS | RESPIRATION RATE: 18 BRPM | HEART RATE: 65 BPM | SYSTOLIC BLOOD PRESSURE: 117 MMHG | DIASTOLIC BLOOD PRESSURE: 59 MMHG | TEMPERATURE: 98 F | OXYGEN SATURATION: 97 %

## 2022-04-06 DIAGNOSIS — I63.9 CEREBRAL INFARCTION, UNSPECIFIED: ICD-10-CM

## 2022-04-06 DIAGNOSIS — I25.10 ATHEROSCLEROTIC HEART DISEASE OF NATIVE CORONARY ARTERY WITHOUT ANGINA PECTORIS: ICD-10-CM

## 2022-04-06 DIAGNOSIS — I48.0 PAROXYSMAL ATRIAL FIBRILLATION: ICD-10-CM

## 2022-04-06 DIAGNOSIS — E78.5 HYPERLIPIDEMIA, UNSPECIFIED: ICD-10-CM

## 2022-04-06 PROBLEM — I48.91 UNSPECIFIED ATRIAL FIBRILLATION: Chronic | Status: ACTIVE | Noted: 2022-03-28

## 2022-04-06 PROCEDURE — 93010 ELECTROCARDIOGRAM REPORT: CPT

## 2022-04-06 PROCEDURE — 99238 HOSP IP/OBS DSCHRG MGMT 30/<: CPT

## 2022-04-06 PROCEDURE — 71046 X-RAY EXAM CHEST 2 VIEWS: CPT | Mod: 26

## 2022-04-06 RX ORDER — APIXABAN 2.5 MG/1
5 TABLET, FILM COATED ORAL EVERY 12 HOURS
Refills: 0 | Status: DISCONTINUED | OUTPATIENT
Start: 2022-04-06 | End: 2022-04-06

## 2022-04-06 RX ORDER — METOPROLOL TARTRATE 50 MG
1 TABLET ORAL
Qty: 0 | Refills: 0 | DISCHARGE

## 2022-04-06 RX ORDER — ATORVASTATIN CALCIUM 80 MG/1
1 TABLET, FILM COATED ORAL
Qty: 0 | Refills: 0 | DISCHARGE
Start: 2022-04-06

## 2022-04-06 RX ORDER — ACETAMINOPHEN 500 MG
2 TABLET ORAL
Qty: 0 | Refills: 0 | DISCHARGE
Start: 2022-04-06

## 2022-04-06 RX ORDER — METOPROLOL TARTRATE 50 MG
1 TABLET ORAL
Qty: 0 | Refills: 0 | DISCHARGE
Start: 2022-04-06

## 2022-04-06 RX ORDER — APIXABAN 2.5 MG/1
1 TABLET, FILM COATED ORAL
Qty: 0 | Refills: 0 | DISCHARGE
Start: 2022-04-06

## 2022-04-06 RX ORDER — APIXABAN 2.5 MG/1
1 TABLET, FILM COATED ORAL
Qty: 0 | Refills: 0 | DISCHARGE

## 2022-04-06 RX ORDER — ATORVASTATIN CALCIUM 80 MG/1
1 TABLET, FILM COATED ORAL
Qty: 0 | Refills: 0 | DISCHARGE

## 2022-04-06 RX ADMIN — Medication 100 MILLIGRAM(S): at 00:31

## 2022-04-06 RX ADMIN — Medication 100 MILLIGRAM(S): at 08:36

## 2022-04-06 RX ADMIN — Medication 10 MILLIGRAM(S): at 05:48

## 2022-04-06 RX ADMIN — Medication 50 MILLIGRAM(S): at 05:48

## 2022-04-06 RX ADMIN — APIXABAN 5 MILLIGRAM(S): 2.5 TABLET, FILM COATED ORAL at 08:11

## 2022-04-06 NOTE — DISCHARGE NOTE PROVIDER - HOSPITAL COURSE
Patient is a 71 year old male with PMHx of CAD s/p CABG, PAF on Eliquis for thromboembolic prophylaxis, prior CVA, HTN, HLD, preserved LV function who has been having episodes of BARNES as an outpatient and was found to have significant bradycardia with episodes of complete heart block and PPM was recommended. On 4/5/22 patient was taken to the EP lab and underwent successful DC PPM implantation with MDT device. Patient tolerated the procedure well. Patient was observed overnight. On POD #1 patient remains HD stable. Device interrogation showed normal device function and CXR shows no evidence of pneumothorax. Lead placement on CXR was reviewed by EP attending and were satisfied with positioning. Eliquis was resumed prior to discharge. Left chest pocket was examined and was C/D/I with no hematoma or erythema. Arm precautions and wound care were discussed at length. Patient to be discharged home in stable condition. Patient is a 71 year old male with PMHx of CAD s/p CABG, PAF on Eliquis for thromboembolic prophylaxis, prior CVA, HTN, HLD, preserved LV function who has been having episodes of BARNES as an outpatient and was found to have significant bradycardia with episodes of complete heart block and PPM was recommended. On 4/5/22 patient was taken to the EP lab and underwent successful Medtronic DC PPM implantation. Patient tolerated the procedure well. Patient was observed overnight. On POD #1 patient remains HD stable. Device interrogation showed normal device function and CXR shows no evidence of pneumothorax. Lead placement on CXR was reviewed by EP attending and were satisfied with positioning. Eliquis was resumed prior to discharge. Left chest pocket was examined and was C/D/I with no hematoma or erythema. Arm precautions and wound care were discussed at length. Patient to be discharged home in stable condition.

## 2022-04-06 NOTE — DISCHARGE NOTE PROVIDER - NSDCFUADDINST_GEN_ALL_CORE_FT
Keep arm below shoulder for 6 weeks  No lifting > 5-10 LBS for 6 weeks  Keep incision Dry  Cont all previous medications

## 2022-04-06 NOTE — DISCHARGE NOTE PROVIDER - ATTENDING DISCHARGE PHYSICAL EXAMINATION:
Incision site is clean, dry, and intact. Patient is Kyrgyz-speaking. All discharge instructions were discussed with the patient's daughter, as per the patient's preference.

## 2022-04-06 NOTE — DISCHARGE NOTE NURSING/CASE MANAGEMENT/SOCIAL WORK - PATIENT PORTAL LINK FT
You can access the FollowMyHealth Patient Portal offered by [facility] by registering at the following website: [email or web address]. By joining [employer]’s FollowMyHealth portal, you will also be able to view your health information using other applications (apps) compatible with our system.

## 2022-04-06 NOTE — PROGRESS NOTE ADULT - SUBJECTIVE AND OBJECTIVE BOX
INTERVAL HPI/OVERNIGHT EVENTS: No acute events overnight    MEDICATIONS  (STANDING):  apixaban 5 milliGRAM(s) Oral every 12 hours  atorvastatin 20 milliGRAM(s) Oral at bedtime  enalapril 10 milliGRAM(s) Oral daily  metoprolol succinate ER 50 milliGRAM(s) Oral daily    MEDICATIONS  (PRN):  acetaminophen     Tablet .. 650 milliGRAM(s) Oral every 6 hours PRN Mild Pain (1 - 3)      Allergies  No Known Allergies    Intolerances        REVIEW OF SYSTEMS: No CP, palpitations, dizziness or SOB    Vital Signs Last 24 Hrs  T(C): 36.6 (06 Apr 2022 04:57), Max: 36.8 (05 Apr 2022 20:11)  T(F): 97.8 (06 Apr 2022 04:57), Max: 98.3 (05 Apr 2022 20:11)  HR: 65 (06 Apr 2022 04:57) (58 - 85)  BP: 117/59 (06 Apr 2022 04:57) (117/59 - 186/76)  BP(mean): --  RR: 18 (06 Apr 2022 04:57) (18 - 18)  SpO2: 97% (06 Apr 2022 04:57) (97% - 97%)    04-05-22 @ 07:01  -  04-06-22 @ 07:00  --------------------------------------------------------  IN: 355 mL / OUT: 0 mL / NET: 355 mL        Physical Exam  GENERAL: In no apparent distress, well nourished, and hydrated.  EYES: EOMI, PERRLA, conjunctiva and sclera clear  ENMT: No tonsillar erythema, exudates, or enlargements; ist mucous membranes, Good dentition, No lesions  NECK: Supple   HEART: Regular rate and rhythm; No murmurs, rubs, or gallops.  PULMONARY: Clear to auscultation and perfusion.  No rales, wheezing, or rhonchi bilaterally.  ABDOMEN: Soft, Nontender, Nondistended; Bowel sounds present  EXTREMITIES:  2+ Peripheral Pulses, No clubbing, cyanosis, or edema  SKIN: Dressing over L chest wall, no hematoma  NEUROLOGICAL: Grossly nonfocal    LABS:                RADIOLOGY & ADDITIONAL TESTS:

## 2022-04-06 NOTE — DISCHARGE NOTE PROVIDER - NSDCMRMEDTOKEN_GEN_ALL_CORE_FT
apixaban 5 mg oral tablet: 1 tab(s) orally every 12 hours  atorvastatin 20 mg oral tablet: 1 tab(s) orally once a day (at bedtime)  enalapril 10 mg oral tablet: 1 tab(s) orally once a day  meclizine:   metoprolol succinate 50 mg oral tablet, extended release: 1 tab(s) orally once a day  Tycolene 325 mg oral tablet: 2 tab(s) orally every 6 hours, As needed, Mild Pain (1 - 3)

## 2022-04-06 NOTE — DISCHARGE NOTE PROVIDER - NSDCCPCAREPLAN_GEN_ALL_CORE_FT
PRINCIPAL DISCHARGE DIAGNOSIS  Diagnosis: Complete heart block  Assessment and Plan of Treatment:

## 2022-04-06 NOTE — DISCHARGE NOTE PROVIDER - CARE PROVIDER_API CALL
Ildefonso Moore (MD)  Cardiac Electrophysiology; Cardiology; Internal Medicine  [address] [place] [address]  Phone: [phone]  Fax: [phone]  Follow Up Time: 1 week

## 2022-04-06 NOTE — PROGRESS NOTE ADULT - ASSESSMENT
Assessment: 70 yo M with hx of AF, HLD, CAD and intermittent HB admitted for DC PPM implant, POD#1 and feeling well    Impression:  Intermittent HB sp DC PPM ([employer])  AF  CAD  HLD    Plan:  - CXR completed, leads in appropriate place. Reviewed with Dr Moore  - Interrogation completed, numbers within appropriate range  - Continue Eliquis 5mg Q12h  - Continue all other home medications  - Do not lift left arm above shoulder height or lift > 5 lbs for 6 weeks  - Leave dressing in place and do not get wet until follow up appt  - Follow up with Dr Moore in 1 week

## 2022-04-08 DIAGNOSIS — E78.5 HYPERLIPIDEMIA, UNSPECIFIED: ICD-10-CM

## 2022-04-08 DIAGNOSIS — I69.398 OTHER SEQUELAE OF CEREBRAL INFARCTION: ICD-10-CM

## 2022-04-08 DIAGNOSIS — Z86.718 PERSONAL HISTORY OF OTHER VENOUS THROMBOSIS AND EMBOLISM: ICD-10-CM

## 2022-04-08 DIAGNOSIS — I10 ESSENTIAL (PRIMARY) HYPERTENSION: ICD-10-CM

## 2022-04-08 DIAGNOSIS — Z79.01 LONG TERM (CURRENT) USE OF ANTICOAGULANTS: ICD-10-CM

## 2022-04-08 DIAGNOSIS — I48.0 PAROXYSMAL ATRIAL FIBRILLATION: ICD-10-CM

## 2022-04-08 DIAGNOSIS — Z95.1 PRESENCE OF AORTOCORONARY BYPASS GRAFT: ICD-10-CM

## 2022-04-08 DIAGNOSIS — I25.10 ATHEROSCLEROTIC HEART DISEASE OF NATIVE CORONARY ARTERY WITHOUT ANGINA PECTORIS: ICD-10-CM

## 2022-04-08 DIAGNOSIS — R41.3 OTHER AMNESIA: ICD-10-CM

## 2022-04-08 DIAGNOSIS — I44.2 ATRIOVENTRICULAR BLOCK, COMPLETE: ICD-10-CM

## 2022-04-10 NOTE — HISTORY OF PRESENT ILLNESS
[FreeTextEntry1] : I had a pleasure of seeing Mr. PITTMAN for consultation for high degree AV block. He is accompanied by his wife. Speaks Polish. \par \par Mr. PITTMAN is a 71 year-year old male with history of CAD/CABG, CVA post CBAG, HTN, nSVT, paroxysmal atrial fibrillation is here for high degree AV block.\par \par + High degree AV block on monitor0 with symptoms\par + Fatigue\par \par Denies chest pain, shortness of breath, palpitation, dizziness or LOC except noted above.\par \par EKG (3/23/22): SR@ 53, , QRSd 86\par TTE (03/22): Nl EF, Mod LAE\par Cardio: Dr. Quiles\par

## 2022-04-10 NOTE — ASSESSMENT
[FreeTextEntry1] : ## High degree AV block\par ## Severe 1st degree AV block-symptomatic\par ## paroxysmal atrial fibrillation \par \par - I discussed the risks, benefits and alternatives for device implantation with patient and available family members.  \par I reported a risk of major complications at 1% and minor complications at 3%. The details of the procedure and risks associated with undergoing the procedure were discussed in detail including but not limited to, death, myocardial ischemia, stroke, cardiac perforation, potential need for temporary pacemaker implantation, lung damage requiring chest tube (pneumothorax), blood clot, blood collection requiring blood transfusion or repeat surgery (hematoma), infection, or vascular injury.  All questions were answered to satisfaction and the patient expressed verbal understanding of the procedure, the benefits, and risks. The patient agreed to proceed with the procedure.\par  - Continue OAC\par - BB as needed- after PPM placement\par

## 2022-04-13 ENCOUNTER — APPOINTMENT (OUTPATIENT)
Dept: CARDIOLOGY | Facility: CLINIC | Age: 72
End: 2022-04-13

## 2022-04-13 VITALS
DIASTOLIC BLOOD PRESSURE: 69 MMHG | BODY MASS INDEX: 31.34 KG/M2 | TEMPERATURE: 97.6 F | HEIGHT: 66 IN | HEART RATE: 68 BPM | WEIGHT: 195 LBS | SYSTOLIC BLOOD PRESSURE: 110 MMHG

## 2022-04-13 DIAGNOSIS — Z45.018 ENCOUNTER FOR ADJUSTMENT AND MANAGEMENT OF OTHER PART OF CARDIAC PACEMAKER: ICD-10-CM

## 2022-04-13 DIAGNOSIS — Z48.89 ENCOUNTER FOR OTHER SPECIFIED SURGICAL AFTERCARE: ICD-10-CM

## 2022-04-13 DIAGNOSIS — Z95.0 PRESENCE OF CARDIAC PACEMAKER: ICD-10-CM

## 2022-04-13 PROCEDURE — XXXXX: CPT

## 2022-04-13 RX ORDER — DOCUSATE SODIUM 100 MG/1
100 TABLET ORAL DAILY
Refills: 0 | Status: COMPLETED | COMMUNITY
End: 2022-04-13

## 2022-04-13 NOTE — REASON FOR VISIT
[New Patient Device Check] : is here today for a new patient device check visit for [Spouse] : spouse [Other: _____] : [unfilled]

## 2022-04-13 NOTE — PHYSICAL EXAM
[Well Developed] : well developed [Well Nourished] : well nourished [No Acute Distress] : no acute distress [Normal Conjunctiva] : normal conjunctiva [Normal Venous Pressure] : normal venous pressure [No Carotid Bruit] : no carotid bruit [Normal S1, S2] : normal S1, S2 [No Murmur] : no murmur [No Rub] : no rub [No Gallop] : no gallop [Clear Lung Fields] : clear lung fields [Good Air Entry] : good air entry [No Respiratory Distress] : no respiratory distress  [Soft] : abdomen soft [Non Tender] : non-tender [No Masses/organomegaly] : no masses/organomegaly [Normal Bowel Sounds] : normal bowel sounds [Normal Gait] : normal gait [No Edema] : no edema [No Cyanosis] : no cyanosis [No Clubbing] : no clubbing [No Varicosities] : no varicosities [No Rash] : no rash [No Skin Lesions] : no skin lesions [Moves all extremities] : moves all extremities [No Focal Deficits] : no focal deficits [Normal Speech] : normal speech [Alert and Oriented] : alert and oriented [Normal memory] : normal memory [General Appearance - Well Developed] : well developed [Normal Appearance] : normal appearance [Well Groomed] : well groomed [General Appearance - Well Nourished] : well nourished [No Deformities] : no deformities [General Appearance - In No Acute Distress] : no acute distress [Heart Rate And Rhythm] : heart rate and rhythm were normal [Heart Sounds] : normal S1 and S2 [Murmurs] : no murmurs present [] : no respiratory distress [Respiration, Rhythm And Depth] : normal respiratory rhythm and effort [Exaggerated Use Of Accessory Muscles For Inspiration] : no accessory muscle use [Left Infraclavicular] : left infraclavicular area [Clean] : clean [Dry] : dry [Healing Well] : healing well [Abdomen Soft] : soft [Nail Clubbing] : no clubbing of the fingernails [Cyanosis, Localized] : no localized cyanosis [FreeTextEntry2] : Mild swelling, dermabond intact over incision

## 2022-04-13 NOTE — HISTORY OF PRESENT ILLNESS
[FreeTextEntry1] : I had a pleasure of seeing Mr. PITTMAN s/p DC PPM implant for high degree AV block on 4/5/22. He is accompanied by his wife and daughter. Speaks Wolof. \par \par Mr. PITTMAN is a 71 year-year old male with history of CAD/CABG, CVA post CABG, HTN, nSVT, paroxysmal atrial fibrillation, symptomatic high degree AV block.\par \par He presents today for wound check and device interrogation. He feels better, improved activity tolerance and not fatigued.\par Denies chest pain, shortness of breath, palpitations, dizziness.\par \par Cardio: Dr. Quiles\par

## 2022-04-13 NOTE — CARDIOLOGY SUMMARY
[de-identified] : 4/13/22: SR, intermittent A-paced, V-paced 68 bpm, occ PVC\par EKG (3/23/22): SR@ 53, , QRSd 86 [de-identified] : TTE (03/22): Nl EF, Mod LAE

## 2022-04-13 NOTE — ASSESSMENT
[FreeTextEntry1] : ## High degree AV block\par ## Severe 1st degree AV block-symptomatic\par ## paroxysmal atrial fibrillation \par ## s/p placement MDT DC PPM\par \par - DSD removed. Wound is healing well. Mild swelling to pocket. Dermabond intact over incision There are no signs or symptoms of infection of inflammation. There is no redness, no swelling, no erythema. The patient has no pain. Advised patient that he can shower, can allow water to fall over incision but not to scrub area. Advised to call office if any concerning signs. \par - Reminded patient to maintain left arm not higher than shoulder level until 5/17 (6 weeks post op). He is forgetful at home, the wife and daughter have been clipping patients arm with a shoelace to the side of his shirt to prompt him to remember not to raise arm.\par - Patient is enrolled in remote monitoring services in office today. I reviewed remote transmission process with the patient, as well as schedule and ability to do manual transmission. I ensured that monitor is working appropriately and transmitting to [employer]. We also spoke about associated co-payments with remote monitoring that may not be covered by insurance. \par  - Continue Eliquis - no s/s bleeding reported. 3/31/22 hgb 9.2, creat 1.35. Discussed that hgb is low - concern for bleeding - patient was worked up for this. Stool sample negative for blood, was on iron pills but stopped. Will follow with PCP.\par - Cont. metoprolol for rate control\par - RTO in 4 months\par

## 2022-04-13 NOTE — PROCEDURE
[No] : not [Sinus Bradycardia] : sinus bradycardia [See Device Printout] : See device printout [Pacemaker] : pacemaker [Threshold Testing Performed] : Threshold testing was performed [Lead Imp:  ___ohms] : lead impedance was [unfilled] ohms [Sensing Amplitude ___mv] : sensing amplitude was [unfilled] mv [___V @] : [unfilled] V [___ ms] : [unfilled] ms [de-identified] : ROSHAN [de-identified] : Anusha [id number] [de-identified] : [id number] [de-identified] : 4/5/22 [de-identified] : AAIR<=>DDDR [de-identified] : 10.9 years [de-identified] : AP/ 89.8/79.2%\par 1 AT episode 34 seconds - aflutter

## 2022-04-14 ENCOUNTER — APPOINTMENT (OUTPATIENT)
Dept: CARDIOLOGY | Facility: CLINIC | Age: 72
End: 2022-04-14

## 2022-04-20 ENCOUNTER — APPOINTMENT (OUTPATIENT)
Dept: CARDIOLOGY | Facility: CLINIC | Age: 72
End: 2022-04-20

## 2022-04-20 VITALS
HEART RATE: 61 BPM | RESPIRATION RATE: 16 BRPM | TEMPERATURE: 97.2 F | OXYGEN SATURATION: 95 % | SYSTOLIC BLOOD PRESSURE: 108 MMHG | BODY MASS INDEX: 30.86 KG/M2 | WEIGHT: 192 LBS | HEIGHT: 66 IN | DIASTOLIC BLOOD PRESSURE: 80 MMHG

## 2022-05-05 ENCOUNTER — APPOINTMENT (OUTPATIENT)
Dept: CARDIOLOGY | Facility: CLINIC | Age: 72
End: 2022-05-05
Payer: MEDICAID

## 2022-05-05 VITALS
OXYGEN SATURATION: 95 % | TEMPERATURE: 97.8 F | SYSTOLIC BLOOD PRESSURE: 114 MMHG | WEIGHT: 196.8 LBS | BODY MASS INDEX: 31.63 KG/M2 | HEART RATE: 63 BPM | DIASTOLIC BLOOD PRESSURE: 72 MMHG | HEIGHT: 66 IN

## 2022-05-05 DIAGNOSIS — I82.90 ACUTE EMBOLISM AND THROMBOSIS OF UNSPECIFIED VEIN: ICD-10-CM

## 2022-05-05 PROCEDURE — 99214 OFFICE O/P EST MOD 30 MIN: CPT

## 2022-05-05 PROCEDURE — 93281 PM DEVICE PROGR EVAL MULTI: CPT

## 2022-05-05 RX ORDER — ENALAPRIL MALEATE 10 MG/1
10 TABLET ORAL TWICE DAILY
Refills: 0 | Status: DISCONTINUED | COMMUNITY
End: 2022-05-05

## 2022-05-05 NOTE — HISTORY OF PRESENT ILLNESS
[FreeTextEntry1] : 71 year old male came in today for evaluation of a CVA 7 months ago, and a blood clot in the leg 1 year ago. The patient had a thrombectomy of the right middle cerebral artery at that time. The patient had a CABG 12 years ago in [place] and a stoke with paralysis of left side 13 years ago.\par The patient denies chest pains, shortness of breath, palpitations, dizziness, loss of consciousness, or swelling of the ankles.\par \par The patient never smoked. The patient denies substance abuse. The patient has HTN. The patient denies DM, or high cholesterol. The patient does snore. The patient does not have difficulty sleeping or dozing off during the day.\par \par The patient complains of left calf pain when he walks about 15 min.  Sometimes he also has it on the right.  It only happens with walking.\par \par The patient had left leg pains 3 weeks ago for left leg pain and was found to be in atrial fibrillation.  The patient today is back in sinus rhythm.  He was started on Eliquis 2.5 mg po bid.\par \par PPM was placed on 4/5/2022.  The patient has improved in his ability to do things but still gets tired with some of his activities.\par \par PMHX:\par \par CVA - right middle cerebral artery thrombectomy - 7/2019 - stable\par HTN - controlled\par h/o CAD - s/p CABG around 2010 - stable\par CVA with parlysis - around 2007 - stable\par NSVT - stable\par Ischemic heart disease -stable\par s/p CABG - 3 vessel - 12 years ago in [place] - stable\par Venous thrombosis - 1/2019 - stable\par Leg pain - stable\par Paroxysmal atrial fibrillaiton - CHADSVASC2 - 4 - stable\par PPM placed 4/5/2022 - stable\par \par Medications reviewed and reconciled with patient. Patient is compliant with taking appropriate medications at appropriate doses at appropriate intervals without missing doses.

## 2022-05-05 NOTE — ASSESSMENT
[FreeTextEntry1] : CVA - right middle cerebral artery thrombectomy - 7/2019 - stable HTN - controlled h/o CAD - s/p CABG around 2010 - stable CVA with parlysis - around 2007 - stable NSVT - stable Ischemic heart disease -stable s/p CABG - 3 vessel - 12 years ago in [place] - stable Venous thrombosis - 1/2019 - stable Leg pain - stable Paroxysmal atrial fibrillaiton - CHADSVASC2 - 4 - stable PPM placed 4/5/2022 - stable

## 2022-05-05 NOTE — DISCUSSION/SUMMARY
[With Me] : with me [FreeTextEntry1] : Last visit note reviewed.\par PPM interrogated and programmed - (CHYNA, increased rate response sensitivity, DDDR mode)\par Recent lab results were reviewed.(including  CBC, BMP, LFTs, Lipid Profile, HgbA1c, CRP, UA, Urine Microalbumin, Mg, Ca, TSH, T3, T4).\par Ekg was performed and interpreted. (Atrial sensed, ventricular paced)\par \par \par Fasting CBC, BMP, LFTs, Lipid Profile prior to next visit\par f/u 3 months\par \par Number/complexity of problems - Mod - 2 or more chronic stable illnesses\par Amount/complexity of data -history, exam, reviewed old note, labs (see above), reviewed, ekg reviewed, PPM interrogated and reprogrammed\par Risk of complications - Mod\par \par \par \par \par \par Number/complexity of problems - High - 1 or more chronic illnesses with severe exacerbation, progression or side effects of treatment\par Amount/complexity of data -history, exam, reviewed old note, labs reviewed, ekg reviewed\par Risk of complications - High\par \par

## 2022-06-08 ENCOUNTER — INPATIENT (INPATIENT)
Facility: HOSPITAL | Age: 72
LOS: 2 days | Discharge: HOME HEALTH | End: 2022-06-11
Attending: STUDENT IN AN ORGANIZED HEALTH CARE EDUCATION/TRAINING PROGRAM | Admitting: STUDENT IN AN ORGANIZED HEALTH CARE EDUCATION/TRAINING PROGRAM
Payer: MEDICAID

## 2022-06-08 VITALS
HEART RATE: 74 BPM | DIASTOLIC BLOOD PRESSURE: 54 MMHG | SYSTOLIC BLOOD PRESSURE: 94 MMHG | HEIGHT: 66 IN | RESPIRATION RATE: 18 BRPM | OXYGEN SATURATION: 98 % | TEMPERATURE: 99 F

## 2022-06-08 DIAGNOSIS — Z95.5 PRESENCE OF CORONARY ANGIOPLASTY IMPLANT AND GRAFT: ICD-10-CM

## 2022-06-08 DIAGNOSIS — N18.30 CHRONIC KIDNEY DISEASE, STAGE 3 UNSPECIFIED: ICD-10-CM

## 2022-06-08 DIAGNOSIS — Z95.1 PRESENCE OF AORTOCORONARY BYPASS GRAFT: ICD-10-CM

## 2022-06-08 DIAGNOSIS — I12.9 HYPERTENSIVE CHRONIC KIDNEY DISEASE WITH STAGE 1 THROUGH STAGE 4 CHRONIC KIDNEY DISEASE, OR UNSPECIFIED CHRONIC KIDNEY DISEASE: ICD-10-CM

## 2022-06-08 DIAGNOSIS — I48.0 PAROXYSMAL ATRIAL FIBRILLATION: ICD-10-CM

## 2022-06-08 DIAGNOSIS — Z79.01 LONG TERM (CURRENT) USE OF ANTICOAGULANTS: ICD-10-CM

## 2022-06-08 DIAGNOSIS — Z20.822 CONTACT WITH AND (SUSPECTED) EXPOSURE TO COVID-19: ICD-10-CM

## 2022-06-08 DIAGNOSIS — K44.9 DIAPHRAGMATIC HERNIA WITHOUT OBSTRUCTION OR GANGRENE: ICD-10-CM

## 2022-06-08 DIAGNOSIS — E78.5 HYPERLIPIDEMIA, UNSPECIFIED: ICD-10-CM

## 2022-06-08 DIAGNOSIS — Z86.718 PERSONAL HISTORY OF OTHER VENOUS THROMBOSIS AND EMBOLISM: ICD-10-CM

## 2022-06-08 DIAGNOSIS — Z98.890 OTHER SPECIFIED POSTPROCEDURAL STATES: Chronic | ICD-10-CM

## 2022-06-08 DIAGNOSIS — R73.03 PREDIABETES: ICD-10-CM

## 2022-06-08 DIAGNOSIS — D50.0 IRON DEFICIENCY ANEMIA SECONDARY TO BLOOD LOSS (CHRONIC): ICD-10-CM

## 2022-06-08 DIAGNOSIS — I69.311 MEMORY DEFICIT FOLLOWING CEREBRAL INFARCTION: ICD-10-CM

## 2022-06-08 DIAGNOSIS — I25.10 ATHEROSCLEROTIC HEART DISEASE OF NATIVE CORONARY ARTERY WITHOUT ANGINA PECTORIS: ICD-10-CM

## 2022-06-08 DIAGNOSIS — K55.21 ANGIODYSPLASIA OF COLON WITH HEMORRHAGE: ICD-10-CM

## 2022-06-08 DIAGNOSIS — K64.8 OTHER HEMORRHOIDS: ICD-10-CM

## 2022-06-08 DIAGNOSIS — Z95.1 PRESENCE OF AORTOCORONARY BYPASS GRAFT: Chronic | ICD-10-CM

## 2022-06-08 DIAGNOSIS — I69.315 COGNITIVE SOCIAL OR EMOTIONAL DEFICIT FOLLOWING CEREBRAL INFARCTION: ICD-10-CM

## 2022-06-08 DIAGNOSIS — Z95.0 PRESENCE OF CARDIAC PACEMAKER: ICD-10-CM

## 2022-06-08 DIAGNOSIS — K29.70 GASTRITIS, UNSPECIFIED, WITHOUT BLEEDING: ICD-10-CM

## 2022-06-08 LAB
ALBUMIN SERPL ELPH-MCNC: 3.6 G/DL — SIGNIFICANT CHANGE UP (ref 3.5–5.2)
ALP SERPL-CCNC: 91 U/L — SIGNIFICANT CHANGE UP (ref 30–115)
ALT FLD-CCNC: 12 U/L — SIGNIFICANT CHANGE UP (ref 0–41)
ANION GAP SERPL CALC-SCNC: 12 MMOL/L — SIGNIFICANT CHANGE UP (ref 7–14)
APTT BLD: 32.3 SEC — SIGNIFICANT CHANGE UP (ref 27–39.2)
AST SERPL-CCNC: 17 U/L — SIGNIFICANT CHANGE UP (ref 0–41)
BASOPHILS # BLD AUTO: 0.03 K/UL — SIGNIFICANT CHANGE UP (ref 0–0.2)
BASOPHILS NFR BLD AUTO: 0.5 % — SIGNIFICANT CHANGE UP (ref 0–1)
BILIRUB SERPL-MCNC: 0.3 MG/DL — SIGNIFICANT CHANGE UP (ref 0.2–1.2)
BLD GP AB SCN SERPL QL: SIGNIFICANT CHANGE UP
BUN SERPL-MCNC: 23 MG/DL — HIGH (ref 10–20)
CALCIUM SERPL-MCNC: 9 MG/DL — SIGNIFICANT CHANGE UP (ref 8.5–10.1)
CHLORIDE SERPL-SCNC: 104 MMOL/L — SIGNIFICANT CHANGE UP (ref 98–110)
CO2 SERPL-SCNC: 24 MMOL/L — SIGNIFICANT CHANGE UP (ref 17–32)
CREAT SERPL-MCNC: 1.5 MG/DL — SIGNIFICANT CHANGE UP (ref 0.7–1.5)
EGFR: 49 ML/MIN/1.73M2 — LOW
EOSINOPHIL # BLD AUTO: 0.38 K/UL — SIGNIFICANT CHANGE UP (ref 0–0.7)
EOSINOPHIL NFR BLD AUTO: 6 % — SIGNIFICANT CHANGE UP (ref 0–8)
GLUCOSE SERPL-MCNC: 125 MG/DL — HIGH (ref 70–99)
HCT VFR BLD CALC: 24.8 % — LOW (ref 42–52)
HGB BLD-MCNC: 7.3 G/DL — LOW (ref 14–18)
IMM GRANULOCYTES NFR BLD AUTO: 0.3 % — SIGNIFICANT CHANGE UP (ref 0.1–0.3)
INR BLD: 1.36 RATIO — HIGH (ref 0.65–1.3)
LYMPHOCYTES # BLD AUTO: 1.7 K/UL — SIGNIFICANT CHANGE UP (ref 1.2–3.4)
LYMPHOCYTES # BLD AUTO: 26.7 % — SIGNIFICANT CHANGE UP (ref 20.5–51.1)
MCHC RBC-ENTMCNC: 22.3 PG — LOW (ref 27–31)
MCHC RBC-ENTMCNC: 29.4 G/DL — LOW (ref 32–37)
MCV RBC AUTO: 75.6 FL — LOW (ref 80–94)
MONOCYTES # BLD AUTO: 0.9 K/UL — HIGH (ref 0.1–0.6)
MONOCYTES NFR BLD AUTO: 14.1 % — HIGH (ref 1.7–9.3)
NEUTROPHILS # BLD AUTO: 3.34 K/UL — SIGNIFICANT CHANGE UP (ref 1.4–6.5)
NEUTROPHILS NFR BLD AUTO: 52.4 % — SIGNIFICANT CHANGE UP (ref 42.2–75.2)
NRBC # BLD: 0 /100 WBCS — SIGNIFICANT CHANGE UP (ref 0–0)
PLATELET # BLD AUTO: 230 K/UL — SIGNIFICANT CHANGE UP (ref 130–400)
POTASSIUM SERPL-MCNC: 4.2 MMOL/L — SIGNIFICANT CHANGE UP (ref 3.5–5)
POTASSIUM SERPL-SCNC: 4.2 MMOL/L — SIGNIFICANT CHANGE UP (ref 3.5–5)
PROT SERPL-MCNC: 6.1 G/DL — SIGNIFICANT CHANGE UP (ref 6–8)
PROTHROM AB SERPL-ACNC: 15.6 SEC — HIGH (ref 9.95–12.87)
RBC # BLD: 3.28 M/UL — LOW (ref 4.7–6.1)
RBC # FLD: 15.9 % — HIGH (ref 11.5–14.5)
SARS-COV-2 RNA SPEC QL NAA+PROBE: SIGNIFICANT CHANGE UP
SODIUM SERPL-SCNC: 140 MMOL/L — SIGNIFICANT CHANGE UP (ref 135–146)
TROPONIN T SERPL-MCNC: <0.01 NG/ML — SIGNIFICANT CHANGE UP
WBC # BLD: 6.37 K/UL — SIGNIFICANT CHANGE UP (ref 4.8–10.8)
WBC # FLD AUTO: 6.37 K/UL — SIGNIFICANT CHANGE UP (ref 4.8–10.8)

## 2022-06-08 PROCEDURE — 93970 EXTREMITY STUDY: CPT | Mod: 26

## 2022-06-08 PROCEDURE — 93010 ELECTROCARDIOGRAM REPORT: CPT

## 2022-06-08 PROCEDURE — 99285 EMERGENCY DEPT VISIT HI MDM: CPT

## 2022-06-08 PROCEDURE — 71045 X-RAY EXAM CHEST 1 VIEW: CPT | Mod: 26

## 2022-06-08 PROCEDURE — 99223 1ST HOSP IP/OBS HIGH 75: CPT

## 2022-06-08 RX ORDER — ATORVASTATIN CALCIUM 80 MG/1
20 TABLET, FILM COATED ORAL AT BEDTIME
Refills: 0 | Status: DISCONTINUED | OUTPATIENT
Start: 2022-06-08 | End: 2022-06-11

## 2022-06-08 RX ORDER — METOPROLOL TARTRATE 50 MG
50 TABLET ORAL DAILY
Refills: 0 | Status: DISCONTINUED | OUTPATIENT
Start: 2022-06-08 | End: 2022-06-11

## 2022-06-08 RX ORDER — PANTOPRAZOLE SODIUM 20 MG/1
40 TABLET, DELAYED RELEASE ORAL
Refills: 0 | Status: DISCONTINUED | OUTPATIENT
Start: 2022-06-08 | End: 2022-06-09

## 2022-06-08 RX ORDER — SODIUM CHLORIDE 9 MG/ML
1000 INJECTION, SOLUTION INTRAVENOUS
Refills: 0 | Status: DISCONTINUED | OUTPATIENT
Start: 2022-06-08 | End: 2022-06-11

## 2022-06-08 RX ORDER — PANTOPRAZOLE SODIUM 20 MG/1
40 TABLET, DELAYED RELEASE ORAL ONCE
Refills: 0 | Status: COMPLETED | OUTPATIENT
Start: 2022-06-08 | End: 2022-06-08

## 2022-06-08 RX ADMIN — ATORVASTATIN CALCIUM 20 MILLIGRAM(S): 80 TABLET, FILM COATED ORAL at 22:19

## 2022-06-08 RX ADMIN — PANTOPRAZOLE SODIUM 40 MILLIGRAM(S): 20 TABLET, DELAYED RELEASE ORAL at 17:04

## 2022-06-08 RX ADMIN — SODIUM CHLORIDE 100 MILLILITER(S): 9 INJECTION, SOLUTION INTRAVENOUS at 23:38

## 2022-06-08 NOTE — ED PROVIDER NOTE - CLINICAL SUMMARY MEDICAL DECISION MAKING FREE TEXT BOX
70 yo M on eliquis p/w melena and anemia x 3-4 days.   Stable vitals.   No evidence on exam.   GI consult appreciated.   Stable for the floors at this time.   1 unit PRBC ordered.

## 2022-06-08 NOTE — H&P ADULT - NSHPPHYSICALEXAM_GEN_ALL_CORE
GENERAL: NAD, lying in bed comfortably  HEAD:  Atraumatic, Normocephalic  EYES: EOMI, PERRLA, conjunctiva and sclera clear  ENT: Moist mucous membranes  NECK: Supple, No JVD  CHEST/LUNG: Clear to auscultation bilaterally; No rales, rhonchi, wheezing, or rubs. Unlabored respirations  HEART: Regular rate and rhythm; No murmurs, rubs, or gallops  ABDOMEN: Bowel sounds present; Soft, Nontender, Nondistended.   EXTREMITIES:  2+ Peripheral Pulses, brisk capillary refill. No clubbing, cyanosis, or edema  NERVOUS SYSTEM:  Alert & Oriented X3, speech clear. No deficits   MSK: FROM all 4 extremities, full and equal strength  SKIN: No rashes or lesions

## 2022-06-08 NOTE — H&P ADULT - ASSESSMENT
Patient is a 71-year-old male with history of CAD status post CABG in 2010, paroxysmal A. fib, intermittent heart block s/p PPM placed 4/5/2022 on Eliquis, history of DVTs on Eliquis, CVA in 2007  with no residual symptoms, hypertension, hyperlipidemia referred to the ED by his primary care physician for anemia.    #microcytic anemia  -on eliquis for Afib/DVT, reports episodes of black tarry stools  -reports fatigue. hb 7.3 on outpt labs, baseline 9.3  -BP 94/54 on admission. non tachycardiac however on BB  -never had EGD/Colonoscopy  -PPI BID   -NPO  -2 large bore IV  -Trend CBC  -keep Hb > 8 for anesthesia purposes   -keep active type and screen  -Hold home eliquis (last dose 6/8 AM)  -transfuse 1 u of prbc  -clears diet  -GI eval    #Afib   -chadsvasc 4  -hold eliquis  -c/w metoprolol    #DVT on eliquis  -hold eliquis  -repeat [facility] duplex. if negative. SCDs for DVT ppx    #HLD  -hold statin    #HTN  -c/w metoprolol    #Misc  - DVT Prophylaxis: none  - GI Prophylaxis: protonix  - Diet: clear  - Activity: as tolerated  - Code Status: Full Code       Patient is a 71-year-old male with history of CAD status post CABG in 2010, paroxysmal A. fib, intermittent heart block s/p PPM placed 4/5/2022 on Eliquis, history of DVTs on Eliquis, CVA in 2007  with no residual symptoms, hypertension, hyperlipidemia referred to the ED by his primary care physician for anemia.    #microcytic anemia  -on eliquis for Afib/DVT, reports episodes of black tarry stools  -reports fatigue. hb 7.3 on outpt labs, baseline 9.3  -BP 94/54 on admission. non tachycardiac however on BB  -never had EGD/Colonoscopy  -PPI BID   -NPO  -2 large bore IV  -Trend CBC  -keep Hb > 8 for anesthesia purposes   -keep active type and screen  -Hold home eliquis (last dose 6/8 AM)  -transfuse 1 u of prbc  -clears diet, IVF with LR at 100cc/hr  -GI eval    #Afib   -chadsvasc 4  -hold eliquis  -c/w metoprolol    #DVT on eliquis  -hold eliquis  -repeat VA duplex. if negative. SCDs for DVT ppx    #CKD stage iii  -monitor renal function    #HLD  -hold statin    #HTN  -c/w metoprolol    #Misc  - DVT Prophylaxis: none  - GI Prophylaxis: protonix  - Diet: clear  - Activity: as tolerated  - Code Status: Full Code       Patient is a 71-year-old male with history of CAD status post CABG in 2010, paroxysmal A. fib, intermittent heart block s/p PPM placed 4/5/2022 on Eliquis, history of DVTs on Eliquis, CVA in 2007  with no residual symptoms, hypertension, hyperlipidemia referred to the ED by his primary care physician for anemia    #microcytic anemia  -on eliquis for Afib/DVT, reports episodes of black tarry stools  -reports fatigue. hb 7.3 on outpt labs, baseline 9.3  -BP 94/54 on admission. non tachycardiac however on BB  -never had EGD/Colonoscopy  -PPI BID   -NPO  -2 large bore IV  -Trend CBC  -keep Hb > 8 for anesthesia purposes   -keep active type and screen  -Hold home eliquis (last dose 6/8 AM)  -transfuse 1 u of prbc  -clears diet, IVF with LR at 100cc/hr  -GI eval    #Afib   -chadsvasc 4  -hold eliquis  -c/w metoprolol    #DVT on eliquis  -hold eliquis  -repeat VA duplex. if negative. SCDs for DVT ppx    #CKD stage iii  -monitor renal function    #HLD  -hold statin    #HTN  -c/w metoprolol    #Misc  - DVT Prophylaxis: none  - GI Prophylaxis: protonix  - Diet: clear  - Activity: as tolerated  - Code Status: Full Code

## 2022-06-08 NOTE — ED PROVIDER NOTE - PHYSICAL EXAMINATION
CONSTITUTIONAL: Well-developed; well-nourished; in no acute distress.   SKIN: warm, dry  HEAD: Normocephalic; atraumatic.  EYES: PERRL, EOMI, no conjunctival erythema  ENT: No nasal discharge; airway clear.  NECK: Supple; non tender.  CARD: S1, S2 normal; no murmurs, gallops, or rubs. Regular rate and rhythm.   RESP: No wheezes, rales or rhonchi.  ABD: soft ntnd  RECTAL: external hemorrhoids, empty rectal vault.   EXT: Normal ROM.  No clubbing, cyanosis or edema.   NEURO: Alert, oriented, grossly unremarkable  PSYCH: Cooperative, appropriate.

## 2022-06-08 NOTE — H&P ADULT - NSHPLABSRESULTS_GEN_ALL_CORE
Labs:  CAPILLARY BLOOD GLUCOSE                              7.3    6.37  )-----------( 230      ( 08 Jun 2022 15:50 )             24.8       Auto Neutrophil %: 52.4 % (06-08-22 @ 15:50)  Auto Immature Granulocyte %: 0.3 % (06-08-22 @ 15:50)    06-08    140  |  104  |  23<H>  ----------------------------<  125<H>  4.2   |  24  |  1.5      Calcium, Total Serum: 9.0 mg/dL (06-08-22 @ 15:50)      LFTs:             6.1  | 0.3  | 17       ------------------[91      ( 08 Jun 2022 15:50 )  3.6  | x    | 12          Lipase:x      Amylase:x             Coags:     15.60  ----< 1.36    ( 08 Jun 2022 15:50 )     32.3        CARDIAC MARKERS ( 08 Jun 2022 15:50 )  x     / <0.01 ng/mL / x     / x     / x

## 2022-06-08 NOTE — ED ADULT TRIAGE NOTE - AS HEIGHT TYPE
stated Intermediate Repair Preamble Text (Leave Blank If You Do Not Want): Undermining was performed with blunt dissection.

## 2022-06-08 NOTE — H&P ADULT - ATTENDING COMMENTS
Patient seen and examined on 06/08/2022 at 22:56    HPI: Translation and history aided by daughter at bedside    71-year-old gentleman with a history of CAD status post CABG in 2010, paroxysmal A. fib, intermittent heart block s/p PPM placed 4/5/2022 on Eliquis, history of DVTs on Eliquis, CVA in 2007 with no residual symptoms, hypertension, hyperlipidemia admitted for anemia requiring transfusion. He has notably been significantly more fatigued over the last 2 weeks, with melena for the last three days. No abdominal pain, n/v/d, CP, palpitations, LE pain or edema.   OF NOTE: his daughter things he may have been taking double his amount of eliquis (he manages his own medications)   Hb 7.3, s/p 1u PRBC with improvement to 8.2 (baseline ~9). No active complaints at this time.   Rectal exam showed an empty rectal vault       REVIEW OF SYSTEMS:  CONSTITUTIONAL:  +fatigue/weakness, no fevers, chills, night sweats, weight loss  EYES/ENT: No visual changes. No vertigo or dysphagia  NECK: No neck pain or stiffness  RESPIRATORY: No cough, wheezing, hemoptysis. No shortness of breath  CARDIOVASCULAR: No chest pain or palpitations. No lower extremity edema  GASTROINTESTINAL: + melena, No abdominal pain. No nausea, vomiting, diarrhea, or hematemesis  GENITOURINARY: No dysuria or hematuria   NEUROLOGICAL: No focal numbness or weakness  SKIN: No rashes or itching  HEMATOLOGIC: No easy bruising or prolonged bleeding.      PHYSICAL EXAM:  GENERAL: NAD, well-developed, Non-toxic, stated age   HEAD:  Atraumatic, Normocephalic  EYES: EOMI, Sclera White   NECK: Supple, No JVD  CHEST/LUNG: Clear to auscultation bilaterally; No wheezing, rhonchi, or crackles  HEART: Regular rate and rhythm; s1, s2, No murmurs, rubs, or gallops  ABDOMEN: Soft, Nontender, Nondistended; Bowel sounds present, No rebound or guarding noted   EXTREMITIES:  No lower extremity edema or calf tenderness to palpation.  No clubbing or cyanosis  PSYCH: AAOx3, pleasant, cooperative, not anxious  NEUROLOGY: 5/5 strength in all extremities, no downward drift. Sensation grossly intact.   SKIN: No rashes or lesions      ASSESSMENT AND PLAN:  Symptomatic microcytic anemia requiring transfusion  Melena   -s/p 1u PRBC, Hb 7.3 --> 8.2  -Trend CBC, keep 2 large bore IVs, active type and screen. Transfuse if Hb <7, symptomatic, or actively hemorrhaging.   -GI Eval   -protonix 40mg IV BID   -Holding eliquis for now --> may have been taking double recommended amount at home     Paroxsymal atrial fibrillation --> Eliquis held   Hx of DVT  -Rate controlled on metoprolol     CAD s/p CABG  Complete heart block s/p PPM (4/2022)   HTN / HLD  Hx od CVA  -Cont with atorvastatin, metoprolol, and enalapril     DVT ppx: sequentials for now   GI ppx: Protonix bid   GOC: Full code.    My note supersedes the residents in the event of a discrepancy.

## 2022-06-08 NOTE — ED PROVIDER NOTE - PROGRESS NOTE DETAILS
DC: blood transfusion consent in chart. Dc: Spoke to Jacqueline from GI,   recommends protonix BID, clear liquid diet, EGD in 2 days since patient on eliquis. 1 unit of prbc transfusion.  stable for the floors at this time.

## 2022-06-08 NOTE — H&P ADULT - HISTORY OF PRESENT ILLNESS
Patient is a 71-year-old male with history of CAD status post CABG in 2010, paroxysmal A. fib s/p PPM placed 4/5/2022 on Eliquis, history of DVTs on Eliquis, CVA in 2007 with no residual symptoms, hypertension, hyperlipidemia referred to the ED by his primary care physician for anemia.  Patient here with his daughter.  Patient has been feeling fatigued over the last 10 days, started to have black stool approximately 2 episodes a day for the last 3 to 4 days.  Had outpatient labs on the first significant for hemoglobin of 7 which is new for the patient.  Referred to the ED for concern of GI bleed.  Patient denies any chest pain or shortness of breath.  Denies any fevers or abdominal pain. No GI doctor, no recent EGD/colonoscopy. Last dose of Eliquis this AM.     Patient is a 71-year-old male with history of CAD status post CABG in 2010, paroxysmal A. fib, intermittent heart block s/p PPM placed 4/5/2022 on Eliquis, history of DVTs on Eliquis, CVA in 2007 with no residual symptoms, hypertension, hyperlipidemia referred to the ED by his primary care physician for anemia.  Patient here with his daughter.  Patient has been feeling fatigued over the last 10 days, started to have black stool approximately 2 episodes a day for the last 3 to 4 days.  Had outpatient labs on the first significant for hemoglobin of 7 which is new for the patient, baseline HB 9.3 in 2022, 14 in 2020.  Referred to the ED for concern of GI bleed.  Patient denies any chest pain or shortness of breath.  Denies any fevers or abdominal pain. No GI doctor, no recent EGD/colonoscopy. Last dose of Eliquis this AM. WHITNEY by ED revealed empty rectal vault  vitals in the ED · BP Systolic	  94 mm Hg · BP Diastolic	  54 mm Hg · Heart Rate	74 /min · Respiration Rate (breaths/min)	18 /min · Temp (F)	98.6 Degrees F · Temp (C)	37 Degrees C · SpO2 (%)	98 % · O2 Delivery/Oxygen Delivery Method	room air    Patient is a 71-year-old male with history of CAD status post CABG in 2010, paroxysmal A. fib, intermittent heart block s/p PPM placed 4/5/2022 on Eliquis, history of DVTs on Eliquis, CVA in 2007 with no residual symptoms, hypertension, hyperlipidemia referred to the ED by his primary care physician for anemia. Patient here with his daughter. Patient has been feeling fatigued over the last 10 days, started to have black stool approximately 2 episodes a day for the last 3 to 4 days. Had outpatient labs on the first significant for hemoglobin of 7 which is new for the patient, baseline HB 9.3 in 2022, 14 in 2020.  Referred to the ED for concern of GI bleed. Patient denies any chest pain or shortness of breath. Denies any fevers or abdominal pain. No GI doctor, no recent EGD/colonoscopy. Last dose of Eliquis this AM. WHITNEY by ED revealed empty rectal vault  vitals in the ED · BP Systolic	  94 mm Hg · BP Diastolic	  54 mm Hg · Heart Rate	74 /min · Respiration Rate (breaths/min)	18 /min · Temp (F)	98.6 Degrees F · Temp (C)	37 Degrees C · SpO2 (%)	98 % · O2 Delivery/Oxygen Delivery Method	room air     Patient is a 71-year-old male with history of CAD status post CABG in 2010, paroxysmal A. fib, intermittent heart block s/p PPM placed 4/5/2022 on Eliquis, history of DVTs on Eliquis, CVA in 2007 with no residual symptoms, hypertension, hyperlipidemia referred to the ED by his primary care physician for anemia. Patient here with his daughter. Patient has been feeling fatigued over the last10 days, started to have black stool approximately 2 episodes a day for the last 3 to 4 days. Had outpatient labs on the first significant for hemoglobin of 7 which is new for the patient, baseline Hb 9.3 in 2022, 14 in 2020.  Referred to the ED for concern of GI bleed. Patient denies any chest pain or shortness of breath. Denies any fevers or abdominal pain. No GI doctor, no recent EGD/colonoscopy. Last dose of Eliquis this AM. WHITNEY by ED revealed empty rectal vault vitals in the ED · BP Systolic	  94 mm Hg · BP Diastolic	  54 mm Hg · Heart Rate	74 /min · Respiration Rate (breaths/min)	18 /min · Temp (F)	98.6 Degrees F · Temp (C)	37 Degrees C · SpO2 (%)	98 % · O2 Delivery/Oxygen Delivery Method	room air

## 2022-06-08 NOTE — ED PROVIDER NOTE - NS ED ROS FT
Constitutional: No fevers. +fatigue.   Eyes:  No visual changes, eye pain or discharge.  ENMT:  No sore throat or runny nose.  Cardiac:  No chest pain, SOB or edema.   Respiratory:  No cough or respiratory distress. No hemoptysis.   GI:  No nausea, vomiting, diarrhea or abdominal pain. +melena.   :  No dysuria, frequency or burning.  MS:  No myalgia, joint pain or back pain.  Neuro:  No headache or weakness.  No LOC.  Skin:  No skin rash.   Endocrine: No history of thyroid disease or diabetes.

## 2022-06-08 NOTE — ED PROVIDER NOTE - OBJECTIVE STATEMENT
71-year-old male with history of CAD status post CABG, paroxysmal A. 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  Patient here with his daughter.  Patient has been feeling fatigued over the last 10 days, started to have black stool approximately 2 episodes a day for the last 3 to 4 days.  Had outpatient labs on the first significant for hemoglobin of 7 which is new for the patient.  Referred to the ED for concern of GI bleed.  Patient denies any chest pain or shortness of breath.  Denies any fevers or abdominal pain. No GI doctor, no recent EGD/colonoscopy. Last dose of Eliquis this AM.

## 2022-06-09 LAB
A1C WITH ESTIMATED AVERAGE GLUCOSE RESULT: 6.1 % — HIGH (ref 4–5.6)
ALBUMIN SERPL ELPH-MCNC: 3.8 G/DL — SIGNIFICANT CHANGE UP (ref 3.5–5.2)
ALBUMIN SERPL ELPH-MCNC: 4.2 G/DL — SIGNIFICANT CHANGE UP (ref 3.5–5.2)
ALP SERPL-CCNC: 106 U/L — SIGNIFICANT CHANGE UP (ref 30–115)
ALP SERPL-CCNC: 110 U/L — SIGNIFICANT CHANGE UP (ref 30–115)
ALT FLD-CCNC: 13 U/L — SIGNIFICANT CHANGE UP (ref 0–41)
ALT FLD-CCNC: 14 U/L — SIGNIFICANT CHANGE UP (ref 0–41)
ANION GAP SERPL CALC-SCNC: 11 MMOL/L — SIGNIFICANT CHANGE UP (ref 7–14)
ANION GAP SERPL CALC-SCNC: 12 MMOL/L — SIGNIFICANT CHANGE UP (ref 7–14)
APTT BLD: 28.8 SEC — SIGNIFICANT CHANGE UP (ref 27–39.2)
APTT BLD: 29.2 SEC — SIGNIFICANT CHANGE UP (ref 27–39.2)
AST SERPL-CCNC: 16 U/L — SIGNIFICANT CHANGE UP (ref 0–41)
AST SERPL-CCNC: 18 U/L — SIGNIFICANT CHANGE UP (ref 0–41)
BASOPHILS # BLD AUTO: 0.03 K/UL — SIGNIFICANT CHANGE UP (ref 0–0.2)
BASOPHILS # BLD AUTO: 0.04 K/UL — SIGNIFICANT CHANGE UP (ref 0–0.2)
BASOPHILS NFR BLD AUTO: 0.5 % — SIGNIFICANT CHANGE UP (ref 0–1)
BASOPHILS NFR BLD AUTO: 0.6 % — SIGNIFICANT CHANGE UP (ref 0–1)
BILIRUB SERPL-MCNC: 0.6 MG/DL — SIGNIFICANT CHANGE UP (ref 0.2–1.2)
BILIRUB SERPL-MCNC: 0.9 MG/DL — SIGNIFICANT CHANGE UP (ref 0.2–1.2)
BLD GP AB SCN SERPL QL: SIGNIFICANT CHANGE UP
BUN SERPL-MCNC: 14 MG/DL — SIGNIFICANT CHANGE UP (ref 10–20)
BUN SERPL-MCNC: 19 MG/DL — SIGNIFICANT CHANGE UP (ref 10–20)
CALCIUM SERPL-MCNC: 9 MG/DL — SIGNIFICANT CHANGE UP (ref 8.5–10.1)
CALCIUM SERPL-MCNC: 9.4 MG/DL — SIGNIFICANT CHANGE UP (ref 8.5–10.1)
CHLORIDE SERPL-SCNC: 102 MMOL/L — SIGNIFICANT CHANGE UP (ref 98–110)
CHLORIDE SERPL-SCNC: 106 MMOL/L — SIGNIFICANT CHANGE UP (ref 98–110)
CHOLEST SERPL-MCNC: 141 MG/DL — SIGNIFICANT CHANGE UP
CO2 SERPL-SCNC: 23 MMOL/L — SIGNIFICANT CHANGE UP (ref 17–32)
CO2 SERPL-SCNC: 26 MMOL/L — SIGNIFICANT CHANGE UP (ref 17–32)
CREAT SERPL-MCNC: 1.2 MG/DL — SIGNIFICANT CHANGE UP (ref 0.7–1.5)
CREAT SERPL-MCNC: 1.3 MG/DL — SIGNIFICANT CHANGE UP (ref 0.7–1.5)
EGFR: 58 ML/MIN/1.73M2 — LOW
EGFR: 64 ML/MIN/1.73M2 — SIGNIFICANT CHANGE UP
EOSINOPHIL # BLD AUTO: 0.39 K/UL — SIGNIFICANT CHANGE UP (ref 0–0.7)
EOSINOPHIL # BLD AUTO: 0.42 K/UL — SIGNIFICANT CHANGE UP (ref 0–0.7)
EOSINOPHIL NFR BLD AUTO: 6 % — SIGNIFICANT CHANGE UP (ref 0–8)
EOSINOPHIL NFR BLD AUTO: 6.1 % — SIGNIFICANT CHANGE UP (ref 0–8)
ESTIMATED AVERAGE GLUCOSE: 128 MG/DL — HIGH (ref 68–114)
GLUCOSE SERPL-MCNC: 120 MG/DL — HIGH (ref 70–99)
GLUCOSE SERPL-MCNC: 127 MG/DL — HIGH (ref 70–99)
HCT VFR BLD CALC: 27.2 % — LOW (ref 42–52)
HCT VFR BLD CALC: 30 % — LOW (ref 42–52)
HCT VFR BLD CALC: 32.5 % — LOW (ref 42–52)
HDLC SERPL-MCNC: 50 MG/DL — SIGNIFICANT CHANGE UP
HGB BLD-MCNC: 8.2 G/DL — LOW (ref 14–18)
HGB BLD-MCNC: 8.8 G/DL — LOW (ref 14–18)
HGB BLD-MCNC: 9.5 G/DL — LOW (ref 14–18)
IMM GRANULOCYTES NFR BLD AUTO: 0.2 % — SIGNIFICANT CHANGE UP (ref 0.1–0.3)
IMM GRANULOCYTES NFR BLD AUTO: 0.3 % — SIGNIFICANT CHANGE UP (ref 0.1–0.3)
INR BLD: 1.06 RATIO — SIGNIFICANT CHANGE UP (ref 0.65–1.3)
INR BLD: 1.16 RATIO — SIGNIFICANT CHANGE UP (ref 0.65–1.3)
LIPID PNL WITH DIRECT LDL SERPL: 76 MG/DL — SIGNIFICANT CHANGE UP
LYMPHOCYTES # BLD AUTO: 1.75 K/UL — SIGNIFICANT CHANGE UP (ref 1.2–3.4)
LYMPHOCYTES # BLD AUTO: 2.12 K/UL — SIGNIFICANT CHANGE UP (ref 1.2–3.4)
LYMPHOCYTES # BLD AUTO: 27.5 % — SIGNIFICANT CHANGE UP (ref 20.5–51.1)
LYMPHOCYTES # BLD AUTO: 30.2 % — SIGNIFICANT CHANGE UP (ref 20.5–51.1)
MAGNESIUM SERPL-MCNC: 2 MG/DL — SIGNIFICANT CHANGE UP (ref 1.8–2.4)
MAGNESIUM SERPL-MCNC: 2.1 MG/DL — SIGNIFICANT CHANGE UP (ref 1.8–2.4)
MCHC RBC-ENTMCNC: 22.7 PG — LOW (ref 27–31)
MCHC RBC-ENTMCNC: 22.7 PG — LOW (ref 27–31)
MCHC RBC-ENTMCNC: 23 PG — LOW (ref 27–31)
MCHC RBC-ENTMCNC: 29.2 G/DL — LOW (ref 32–37)
MCHC RBC-ENTMCNC: 29.3 G/DL — LOW (ref 32–37)
MCHC RBC-ENTMCNC: 30.1 G/DL — LOW (ref 32–37)
MCV RBC AUTO: 76.2 FL — LOW (ref 80–94)
MCV RBC AUTO: 77.3 FL — LOW (ref 80–94)
MCV RBC AUTO: 77.8 FL — LOW (ref 80–94)
MONOCYTES # BLD AUTO: 0.8 K/UL — HIGH (ref 0.1–0.6)
MONOCYTES # BLD AUTO: 0.92 K/UL — HIGH (ref 0.1–0.6)
MONOCYTES NFR BLD AUTO: 12.6 % — HIGH (ref 1.7–9.3)
MONOCYTES NFR BLD AUTO: 13.1 % — HIGH (ref 1.7–9.3)
NEUTROPHILS # BLD AUTO: 3.39 K/UL — SIGNIFICANT CHANGE UP (ref 1.4–6.5)
NEUTROPHILS # BLD AUTO: 3.51 K/UL — SIGNIFICANT CHANGE UP (ref 1.4–6.5)
NEUTROPHILS NFR BLD AUTO: 49.8 % — SIGNIFICANT CHANGE UP (ref 42.2–75.2)
NEUTROPHILS NFR BLD AUTO: 53.1 % — SIGNIFICANT CHANGE UP (ref 42.2–75.2)
NON HDL CHOLESTEROL: 91 MG/DL — SIGNIFICANT CHANGE UP
NRBC # BLD: 0 /100 WBCS — SIGNIFICANT CHANGE UP (ref 0–0)
PHOSPHATE SERPL-MCNC: 2.7 MG/DL — SIGNIFICANT CHANGE UP (ref 2.1–4.9)
PLATELET # BLD AUTO: 218 K/UL — SIGNIFICANT CHANGE UP (ref 130–400)
PLATELET # BLD AUTO: 221 K/UL — SIGNIFICANT CHANGE UP (ref 130–400)
PLATELET # BLD AUTO: 257 K/UL — SIGNIFICANT CHANGE UP (ref 130–400)
POTASSIUM SERPL-MCNC: 4.3 MMOL/L — SIGNIFICANT CHANGE UP (ref 3.5–5)
POTASSIUM SERPL-MCNC: 4.7 MMOL/L — SIGNIFICANT CHANGE UP (ref 3.5–5)
POTASSIUM SERPL-SCNC: 4.3 MMOL/L — SIGNIFICANT CHANGE UP (ref 3.5–5)
POTASSIUM SERPL-SCNC: 4.7 MMOL/L — SIGNIFICANT CHANGE UP (ref 3.5–5)
PROT SERPL-MCNC: 6.4 G/DL — SIGNIFICANT CHANGE UP (ref 6–8)
PROT SERPL-MCNC: 7.2 G/DL — SIGNIFICANT CHANGE UP (ref 6–8)
PROTHROM AB SERPL-ACNC: 12.2 SEC — SIGNIFICANT CHANGE UP (ref 9.95–12.87)
PROTHROM AB SERPL-ACNC: 13.3 SEC — HIGH (ref 9.95–12.87)
RBC # BLD: 3.57 M/UL — LOW (ref 4.7–6.1)
RBC # BLD: 3.88 M/UL — LOW (ref 4.7–6.1)
RBC # BLD: 4.18 M/UL — LOW (ref 4.7–6.1)
RBC # FLD: 15.9 % — HIGH (ref 11.5–14.5)
RBC # FLD: 16.1 % — HIGH (ref 11.5–14.5)
RBC # FLD: 16.1 % — HIGH (ref 11.5–14.5)
SODIUM SERPL-SCNC: 139 MMOL/L — SIGNIFICANT CHANGE UP (ref 135–146)
SODIUM SERPL-SCNC: 141 MMOL/L — SIGNIFICANT CHANGE UP (ref 135–146)
TRIGL SERPL-MCNC: 76 MG/DL — SIGNIFICANT CHANGE UP
WBC # BLD: 6.37 K/UL — SIGNIFICANT CHANGE UP (ref 4.8–10.8)
WBC # BLD: 7.03 K/UL — SIGNIFICANT CHANGE UP (ref 4.8–10.8)
WBC # BLD: 7.72 K/UL — SIGNIFICANT CHANGE UP (ref 4.8–10.8)
WBC # FLD AUTO: 6.37 K/UL — SIGNIFICANT CHANGE UP (ref 4.8–10.8)
WBC # FLD AUTO: 7.03 K/UL — SIGNIFICANT CHANGE UP (ref 4.8–10.8)
WBC # FLD AUTO: 7.72 K/UL — SIGNIFICANT CHANGE UP (ref 4.8–10.8)

## 2022-06-09 PROCEDURE — 99223 1ST HOSP IP/OBS HIGH 75: CPT

## 2022-06-09 PROCEDURE — 99232 SBSQ HOSP IP/OBS MODERATE 35: CPT

## 2022-06-09 RX ORDER — SOD SULF/SODIUM/NAHCO3/KCL/PEG
4000 SOLUTION, RECONSTITUTED, ORAL ORAL ONCE
Refills: 0 | Status: COMPLETED | OUTPATIENT
Start: 2022-06-09 | End: 2022-06-09

## 2022-06-09 RX ORDER — PANTOPRAZOLE SODIUM 20 MG/1
8 TABLET, DELAYED RELEASE ORAL
Qty: 80 | Refills: 0 | Status: DISCONTINUED | OUTPATIENT
Start: 2022-06-09 | End: 2022-06-11

## 2022-06-09 RX ORDER — INFLUENZA VIRUS VACCINE 15; 15; 15; 15 UG/.5ML; UG/.5ML; UG/.5ML; UG/.5ML
0.7 SUSPENSION INTRAMUSCULAR ONCE
Refills: 0 | Status: DISCONTINUED | OUTPATIENT
Start: 2022-06-09 | End: 2022-06-11

## 2022-06-09 RX ADMIN — ATORVASTATIN CALCIUM 20 MILLIGRAM(S): 80 TABLET, FILM COATED ORAL at 21:25

## 2022-06-09 RX ADMIN — Medication 20 MILLIGRAM(S): at 16:51

## 2022-06-09 RX ADMIN — SODIUM CHLORIDE 100 MILLILITER(S): 9 INJECTION, SOLUTION INTRAVENOUS at 16:52

## 2022-06-09 RX ADMIN — Medication 50 MILLIGRAM(S): at 05:27

## 2022-06-09 RX ADMIN — PANTOPRAZOLE SODIUM 10 MG/HR: 20 TABLET, DELAYED RELEASE ORAL at 13:10

## 2022-06-09 RX ADMIN — SODIUM CHLORIDE 100 MILLILITER(S): 9 INJECTION, SOLUTION INTRAVENOUS at 05:28

## 2022-06-09 RX ADMIN — PANTOPRAZOLE SODIUM 40 MILLIGRAM(S): 20 TABLET, DELAYED RELEASE ORAL at 05:26

## 2022-06-09 RX ADMIN — Medication 10 MILLIGRAM(S): at 05:27

## 2022-06-09 RX ADMIN — Medication 4000 MILLILITER(S): at 16:52

## 2022-06-09 NOTE — PATIENT PROFILE ADULT - FALL HARM RISK - HARM RISK INTERVENTIONS

## 2022-06-09 NOTE — CONSULT NOTE ADULT - ATTENDING COMMENTS
Melena in the setting of AFIB on ELiquis (last dose yesterday) adequate Hgb resucitation. WIll prep for EGD colonosocpy in the AM

## 2022-06-09 NOTE — CONSULT NOTE ADULT - SUBJECTIVE AND OBJECTIVE BOX
Gastroenterology Consultation:    Patient is a 71y old  Male who presents with a chief complaint of anemia (08 Jun 2022 17:06)      Admitted on: 06-08-22  HPI:    Patient is a 71-year-old male with history of CAD status post CABG in 2010, paroxysmal A. fib, intermittent heart block s/p PPM placed 4/5/2022 on Eliquis last dose 6/8 AM history of DVTs on Eliquis, CVA in 2007 with no residual symptoms, hypertension, hyperlipidemia referred to the ED by his primary care physician for anemia. Patient here with his daughter. Patient has been feeling fatigued over the last10 days, started to have black stool approximately 2 episodes a day for the last 3 to 4 days. Had outpatient labs on the first significant for hemoglobin of 7 which is new for the patient,  baseline Hb 9.3 in 2022, 14 in 2020.  Referred to the ED for concern of GI bleed. Patient denies any chest pain or shortness of breath. Denies any fevers or abdominal pain. No GI doctor, no recent      Prior EGD: never had  Prior Colonoscopy: never had       PAST MEDICAL & SURGICAL HISTORY:  CAD (coronary artery disease)  s/p stenting and CABG 10 years ago      HLD (hyperlipidemia)      Stroke  10 years ago as per MD (2006, 2009, 2019- HAS FORGETFULNESS AND MOOD CHANGES SINCE LAST STROKE)      DVT, lower extremity  January 2019, not currently on anticoagulation      Afib      S/P CABG (coronary artery bypass graft)      History of hernia repair      History of surgery  THROMBECTOMY          FAMILY HISTORY:  FH: heart disease (Mother)        Social History:  Tobacco: N  Alcohol: N  Drugs: N    Home Medications:  apixaban 5 mg oral tablet: 1 tab(s) orally every 12 hours (06 Apr 2022 10:53)  atorvastatin 20 mg oral tablet: 1 tab(s) orally once a day (at bedtime) (06 Apr 2022 10:53)  enalapril 10 mg oral tablet: 1 tab(s) orally once a day (06 Apr 2022 10:53)  meclizine:  (28 Mar 2022 08:14)  metoprolol succinate 50 mg oral tablet, extended release: 1 tab(s) orally once a day (06 Apr 2022 10:53)  Tycolene 325 mg oral tablet: 2 tab(s) orally every 6 hours, As needed, Mild Pain (1 - 3) (06 Apr 2022 10:53)    MEDICATIONS  (STANDING):  atorvastatin 20 milliGRAM(s) Oral at bedtime  enalapril 10 milliGRAM(s) Oral daily  influenza  Vaccine (HIGH DOSE) 0.7 milliLiter(s) IntraMuscular once  lactated ringers. 1000 milliLiter(s) (100 mL/Hr) IV Continuous <Continuous>  metoprolol succinate ER 50 milliGRAM(s) Oral daily  pantoprazole  Injectable 40 milliGRAM(s) IV Push two times a day    MEDICATIONS  (PRN):      Allergies  No Known Allergies      Review of Systems:   Constitutional:  No Fever, No Chills  ENT/Mouth:  No Hearing Changes,  No Difficulty Swallowing  Eyes:  No Eye Pain, No Vision Changes  Cardiovascular:  No Chest Pain, No Palpitations  Respiratory:  No Cough, No Dyspnea  Gastrointestinal:  As described in HPI  Musculoskeletal:  No Joint Swelling, No Back Pain  Skin:  No Skin Lesions, No Jaundice  Neuro:  No Syncope, No Dizziness  Heme/Lymph:  No Bruising, No Bleeding.          Physical Examination:  T(C): 35.8 (06-09-22 @ 07:18), Max: 37 (06-08-22 @ 13:56)  HR: 60 (06-09-22 @ 07:18) (60 - 74)  BP: 125/82 (06-09-22 @ 07:18) (94/54 - 142/65)  RR: 18 (06-09-22 @ 07:18) (18 - 18)  SpO2: 95% (06-09-22 @ 07:18) (95% - 98%)  Height (cm): 167.6 (06-08-22 @ 13:56)      Constitutional: No acute distress.  Eyes:. Conjunctivae are clear, Sclera is non-icteric.  Ears Nose and Throat: The external ears are normal appearing,  Oral mucosa is pink and moist.  Respiratory:  No signs of respiratory distress. Lung sounds are clear bilaterally.  Cardiovascular:  S1 S2, Regular rate and rhythm.  GI: Abdomen is soft, symmetric, and non-tender without distention. There are no visible lesions or scars. Bowel sounds are present and normoactive in all four quadrants. No masses, hepatomegaly, or splenomegaly are noted.   Neuro: No Tremor, No involuntary movements  Skin: No rashes, No Jaundice.          Data:                        8.2    7.72  )-----------( 218      ( 09 Jun 2022 01:47 )             27.2     Hgb Trend:  8.2  06-09-22 @ 01:47  7.3  06-08-22 @ 15:50      06-08    140  |  104  |  23<H>  ----------------------------<  125<H>  4.2   |  24  |  1.5    Ca    9.0      08 Jun 2022 15:50    TPro  6.1  /  Alb  3.6  /  TBili  0.3  /  DBili  x   /  AST  17  /  ALT  12  /  AlkPhos  91  06-08    Liver panel trend:  TBili 0.3   /   AST 17   /   ALT 12   /   AlkP 91   /   Tptn 6.1   /   Alb 3.6    /   DBili --      06-08      PT/INR - ( 08 Jun 2022 15:50 )   PT: 15.60 sec;   INR: 1.36 ratio         PTT - ( 08 Jun 2022 15:50 )  PTT:32.3 sec        Radiology:

## 2022-06-09 NOTE — CONSULT NOTE ADULT - ASSESSMENT
71-year-old male with history of CAD status post CABG in 2010, paroxysmal A. fib, intermittent heart block s/p PPM placed 4/5/2022 on Eliquis last dose 6/8 AM history of DVTs on Eliquis, CVA in 2007 with no residual symptoms, hypertension, hyperlipidemia referred to the ED by his primary care physician for anemia. Patient here with his daughter. Patient has been feeling fatigued over the last10 days, started to have black stool approximately 2 episodes a day for the last 3 to 4 days. Had outpatient labs on the first significant for hemoglobin of 7 which is new for the patient,  baseline Hb 9.3 in 2022, 14 in 2020.  Referred to the ED for concern of GI bleed    #)Acute on chronic anemia  #)Melena   -Hemodynamically stable   -Baseline Hb 14 in 2020, 9.3 in march 2022, admitted with Hb of 7.3 s/p one unit 8.2   -WHITNEY empty rectal vault   -Last dose of eliquis 6/8 AM (need to hold for 2 days)    Recs:   trend CBC, keep Hb>8  2 18 gauge   Active type and screen   PPI drip   NPO after midnight for EGD and colonoscopy tomorrow  Bowel prep with golytely and dulcolax today evening

## 2022-06-10 ENCOUNTER — TRANSCRIPTION ENCOUNTER (OUTPATIENT)
Age: 72
End: 2022-06-10

## 2022-06-10 ENCOUNTER — RESULT REVIEW (OUTPATIENT)
Age: 72
End: 2022-06-10

## 2022-06-10 PROCEDURE — 88305 TISSUE EXAM BY PATHOLOGIST: CPT | Mod: 26

## 2022-06-10 PROCEDURE — 45382 COLONOSCOPY W/CONTROL BLEED: CPT

## 2022-06-10 PROCEDURE — 99232 SBSQ HOSP IP/OBS MODERATE 35: CPT

## 2022-06-10 PROCEDURE — 43239 EGD BIOPSY SINGLE/MULTIPLE: CPT | Mod: XS

## 2022-06-10 PROCEDURE — 88312 SPECIAL STAINS GROUP 1: CPT | Mod: 26

## 2022-06-10 RX ORDER — APIXABAN 2.5 MG/1
5 TABLET, FILM COATED ORAL
Refills: 0 | Status: DISCONTINUED | OUTPATIENT
Start: 2022-06-10 | End: 2022-06-11

## 2022-06-10 RX ADMIN — Medication 50 MILLIGRAM(S): at 05:50

## 2022-06-10 RX ADMIN — Medication 10 MILLIGRAM(S): at 05:50

## 2022-06-10 RX ADMIN — ATORVASTATIN CALCIUM 20 MILLIGRAM(S): 80 TABLET, FILM COATED ORAL at 21:20

## 2022-06-10 RX ADMIN — SODIUM CHLORIDE 100 MILLILITER(S): 9 INJECTION, SOLUTION INTRAVENOUS at 05:50

## 2022-06-10 RX ADMIN — PANTOPRAZOLE SODIUM 10 MG/HR: 20 TABLET, DELAYED RELEASE ORAL at 06:28

## 2022-06-10 NOTE — DISCHARGE NOTE PROVIDER - NSDCCPCAREPLAN_GEN_ALL_CORE_FT
PRINCIPAL DISCHARGE DIAGNOSIS  Diagnosis: Anemia  Assessment and Plan of Treatment: You came to the hospital for evaluation of low Hemoglobin level and dark stools  You were admitted for work up of Microcytic Anemia in setting of Melena,evaluated by Gastroenterology team  You had EGD Colonoscopy done 6/10 results outlined below:  EGD Impression:  Esophageal hiatal hernia.  Erythema in the stomach compatible with non-erosive gastritis. (Biopsy).  Normal mucosa in the whole examined duodenum. (Biopsy).  Colonoscopy impression:  Large non-bleeding internal hemorrhoids were noted.	  No blood noted in whole colon or terminal ileum.	  2 small non-bleeding AVMs noted in hepatic flexure.. Hemostasis was performed using APCs.  Please follow Gatrology recommendation as state below:  High fiber diet  Await pathology results  Resume anticoagulation  Video capsule endoscopy as outpatient  Follow up as outpatient with GI office in 4 weeks      SECONDARY DISCHARGE DIAGNOSES  Diagnosis: Prediabetes  Assessment and Plan of Treatment: You were found to have Pre-diabetes  your Hemoglobin A1C which average blood sugar in 3 months is 6.1 (Normal <5.7)  It is important that you follow up with your PCP within 1 week for managements  Please follow low Carbohydrate diets

## 2022-06-10 NOTE — PROGRESS NOTE ADULT - SUBJECTIVE AND OBJECTIVE BOX
Hospital Day:  1d    Subjective: Patient is a 71y old  Male who presents with a chief complaint of anemia (09 Jun 2022 08:20)      Pt seen and evaluated at bedside.   Complaints: N  Over the night Events: N    Past Medical Hx:   CAD (coronary artery disease)    HTN (hypertension)    HLD (hyperlipidemia)    Stroke    DVT, lower extremity    Afib      Past Sx:  S/P CABG (coronary artery bypass graft)    History of hernia repair    History of surgery      Allergies:  No Known Allergies    Current Meds:   Standng Meds:  atorvastatin 20 milliGRAM(s) Oral at bedtime  enalapril 10 milliGRAM(s) Oral daily  influenza  Vaccine (HIGH DOSE) 0.7 milliLiter(s) IntraMuscular once  lactated ringers. 1000 milliLiter(s) (100 mL/Hr) IV Continuous <Continuous>  metoprolol succinate ER 50 milliGRAM(s) Oral daily  pantoprazole Infusion 8 mG/Hr (10 mL/Hr) IV Continuous <Continuous>  polyethylene glycol/electrolyte Solution. 4000 milliLiter(s) Oral once    PRN Meds:  bisacodyl 20 milliGRAM(s) Oral once PRN Constipation      Vital Signs:   T(F): 96.4 (06-09-22 @ 07:18), Max: 98.6 (06-08-22 @ 13:56)  HR: 60 (06-09-22 @ 07:18) (60 - 74)  BP: 125/82 (06-09-22 @ 07:18) (94/54 - 142/65)  RR: 18 (06-09-22 @ 07:18) (18 - 18)  SpO2: 95% (06-09-22 @ 07:18) (95% - 98%)    Physical Exam:   GENERAL: NAD, Resting in bed  HEENT: NCAT  CHEST/LUNG: Clear to auscultation bilaterally; No wheezing or rubs.   HEART: Regular rate and rhythm; No murmurs, rubs, or gallops  ABDOMEN: Bowel sounds present; Soft, Nontender, Nondistended.   EXTREMITIES:  No clubbing, cyanosis, or edema  NERVOUS SYSTEM:  Alert & Oriented X3    FLUID BALANCE      Labs:                         8.8    6.37  )-----------( 221      ( 09 Jun 2022 08:14 )             30.0     Neutophil% 53.1, Lymphocyte% 27.5, Monocyte% 12.6, Bands% 0.2 06-09-22 @ 08:14    09 Jun 2022 08:14    141    |  106    |  19     ----------------------------<  127    4.3     |  23     |  1.3      Ca    9.0        09 Jun 2022 08:14  Phos  2.7       09 Jun 2022 08:14  Mg     2.0       09 Jun 2022 08:14    TPro  6.4    /  Alb  3.8    /  TBili  0.9    /  DBili  x      /  AST  16     /  ALT  13     /  AlkPhos  106    09 Jun 2022 08:14       pTT    29.2             ----< 1.16 INR  (06-09-22 @ 08:14)    13.30        PT,    pTT    32.3             ----< 1.36 INR  (06-08-22 @ 15:50)    15.60        PT  Chol 141, LDL --, HDL 50, VLDL --, TRG 76 06-09-22 @ 08:14          Troponin <0.01, CKMB --, CK -- 06-08-22 @ 15:50    
Hospital Day:  1d    Subjective: Patient is a 71y old  Male who presents with a chief complaint of anemia (09 Jun 2022 08:20)    plan for EGD colonoscopy today     Pt seen and evaluated at bedside.   Complaints: N  Over the night Events: N    Past Medical Hx:   CAD (coronary artery disease)    HTN (hypertension)    HLD (hyperlipidemia)    Stroke    DVT, lower extremity    Afib      Past Sx:  S/P CABG (coronary artery bypass graft)    History of hernia repair    History of surgery      Allergies:  No Known Allergies    Current Meds:   Standng Meds:  atorvastatin 20 milliGRAM(s) Oral at bedtime  enalapril 10 milliGRAM(s) Oral daily  influenza  Vaccine (HIGH DOSE) 0.7 milliLiter(s) IntraMuscular once  lactated ringers. 1000 milliLiter(s) (100 mL/Hr) IV Continuous <Continuous>  metoprolol succinate ER 50 milliGRAM(s) Oral daily  pantoprazole Infusion 8 mG/Hr (10 mL/Hr) IV Continuous <Continuous>  polyethylene glycol/electrolyte Solution. 4000 milliLiter(s) Oral once    PRN Meds:  bisacodyl 20 milliGRAM(s) Oral once PRN Constipation      Vital Signs:   Vital Signs Last 24 Hrs  T(C): 35.7 (10 Chon 2022 15:38), Max: 36.1 (10 Chon 2022 07:18)  T(F): 96.3 (10 Chon 2022 15:38), Max: 97 (10 Chon 2022 07:18)  HR: 64 (10 Chon 2022 15:38) (59 - 64)  BP: 130/76 (10 Chon 2022 15:38) (111/79 - 140/87)  BP(mean): --  RR: 18 (10 Chon 2022 10:24) (18 - 18)  SpO2: 98% (10 Chon 2022 15:38) (94% - 98%)    Physical Exam:   GENERAL: NAD, Resting in bed  HEENT: NCAT  CHEST/LUNG: Clear to auscultation bilaterally; No wheezing or rubs.   HEART: Regular rate and rhythm; No murmurs, rubs, or gallops  ABDOMEN: Bowel sounds present; Soft, Nontender, Nondistended.   EXTREMITIES:  No clubbing, cyanosis, or edema  NERVOUS SYSTEM:  Alert & Oriented X3    FLUID BALANCE      Labs:                        LABS:                        9.5    7.03  )-----------( 257      ( 09 Jun 2022 21:38 )             32.5     06-09    139  |  102  |  14  ----------------------------<  120<H>  4.7   |  26  |  1.2    Ca    9.4      09 Jun 2022 21:38  Phos  2.7     06-09  Mg     2.1     06-09    TPro  7.2  /  Alb  4.2  /  TBili  0.6  /  DBili  x   /  AST  18  /  ALT  14  /  AlkPhos  110  06-09    PT/INR - ( 09 Jun 2022 21:38 )   PT: 12.20 sec;   INR: 1.06 ratio         PTT - ( 09 Jun 2022 21:38 )  PTT:28.8 sec            Troponin <0.01, CKMB --, CK -- 06-08-22 @ 15:50

## 2022-06-10 NOTE — PROGRESS NOTE ADULT - ATTENDING COMMENTS
Patient seen at bedside. Changes made within note as well. Discussed with medical team that includes resident(s), medical student(s), nursing staff, case management.     Patient is a 71-year-old male with history of CAD status post CABG in 2010, paroxysmal A. 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 hb 7.3 on outpt labs, baseline 9.3  -BP 94/54 on admission. non tachycardiac however on BB  -never had EGD/Colonoscopy  -GI consulted: Plan for EGD/Colono 6/9  -Protonix gtt  -2 large bore IV  -Trend CBC  -keep active type and screen  -Holding   eliquis (last dose 6/8 AM)  -s/p 1 u of prbc in ED  -clears diet, IVF with LR at 100cc/hr      #Afib   -chadsvasc 4  -hold eliquis  -c/w metoprolol    #DVT on eliquis  -hold eliquis  -repeat [facility] duplex 6/8: NG > start SCD    #CKDIII- stable  -monitor renal function    #HLD  -c/w statin    #HTN  -c/w metoprolol      - DVT Prophylaxis: none  - GI Prophylaxis: protonix  - Diet: clear  - Activity: as tolerated  - Code Status: Full Code    Follow up: monitor hgb Q12. NPO midnight. EGD colonoscopy tomorrow AM. labs in AM.
Patient seen at bedside. Changes made within note as well. Discussed with medical team that includes resident(s), medical student(s), nursing staff, case management.     Patient is a 71-year-old male with history of CAD status post CABG in 2010, paroxysmal A. 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 hb 7.3 on outpt labs, baseline 9.3  -BP 94/54 on admission. non tachycardiac however on BB  -never had EGD/Colonoscopy  -GI consulted: Plan for EGD/Colono 6/9  -Protonix gtt  -2 large bore IV  -Trend CBC  -keep active type and screen  -Holding   eliquis (last dose 6/8 AM)  -s/p 1 u of prbc in ED  -clears diet, IVF with LR at 100cc/hr      #Afib   -chadsvasc 4  -hold eliquis  -c/w metoprolol    #DVT on eliquis  -hold eliquis  -repeat VA duplex 6/8: NG > start SCD    #CKDIII- stable  -monitor renal function    #HLD  -c/w statin    #HTN  -c/w metoprolol      - DVT Prophylaxis: none  - GI Prophylaxis: protonix  - Diet: clear  - Activity: as tolerated  - Code Status: Full Code    Follow up: monitor hgb Q12. NPO midnight. EGD colonoscopy tomorrow AM. labs in AM.

## 2022-06-10 NOTE — DISCHARGE NOTE PROVIDER - NSDCFUADDINST_GEN_ALL_CORE_FT
Please follow Gatrology recommendation as state below: High fiber diet Await pathology results Resume anticoagulation Video capsule endoscopy as outpatient Follow up as outpatient with GI office in 4 weeks

## 2022-06-10 NOTE — DISCHARGE NOTE PROVIDER - NSDCFUSCHEDAPPT_GEN_ALL_CORE_FT
[facility]  CARDIOLOGY [address]  Scheduled Appointment: 07/13/2022    Rommel Quiles  [facility]  CARDIOLOGY [address]  Scheduled Appointment: 08/03/2022

## 2022-06-10 NOTE — DISCHARGE NOTE PROVIDER - HOSPITAL COURSE
Patient is a 71-year-old male with history of CAD status post CABG in 2010, paroxysmal A. fib, intermittent heart block s/p PPM placed 4/5/2022 on Eliquis last dose 6/8 AM history of DVTs on Eliquis, CVA in 2007 with no residual symptoms, hypertension, hyperlipidemia referred to the ED by his primary care physician for anemia. Patient here with his daughter. Patient has been feeling fatigued over the last10 days, started to have black stool approximately 2 episodes a day for the last 3 to 4 days. Had outpatient labs on the first significant for hemoglobin of 7 which is new for the patient,  baseline Hb 9.3 in 2022, 14 in 2020.  Referred to the ED for concern of GI bleed. Patient denies any chest pain or shortness of breath. Denies any fevers or abdominal pain. No GI doctor, no recent  Prior EGD: never had  Prior Colonoscopy: never had   In ED BP 94/54 the rest of VT stable, hgb 7.3 s/p 1 U PRBC  Pt admitted for Microcytic Anemia in setting of Melena, Pt evaluated by GI s/p EGD Colonoscopy 6/10  His eliquis remained on hold during his stay, his hgb remained stable throughout his stay after 1 UPRBC     #R/o GIB  -on eliquis for Afib/DVT, reports episodes of black tarry stools  -reports fatigue. hb 7.3 on outpt labs, baseline 9.3  -  -never had EGD/Colonoscopy  -GI consulted: Plan for EGD/Colono 6/9  -Protonix gtt  -2 large bore IV  -Trend CBC  -keep active type and screen  -Holding   eliquis (last dose 6/8 AM)  -s/p 1 u of prbc in ED  -clears diet, IVF with LR at 100cc/hr   Patient is a 71-year-old male with history of CAD status post CABG in 2010, paroxysmal A. fib, intermittent heart block s/p PPM placed 4/5/2022 on Eliquis last dose 6/8 AM history of DVTs on Eliquis, CVA in 2007 with no residual symptoms, hypertension, hyperlipidemia referred to the ED by his primary care physician for anemia. Patient here with his daughter. Patient has been feeling fatigued over the last10 days, started to have black stool approximately 2 episodes a day for the last 3 to 4 days. Had outpatient labs on the first significant for hemoglobin of 7 which is new for the patient,  baseline Hb 9.3 in 2022, 14 in 2020.  Referred to the ED for concern of GI bleed. Patient denies any chest pain or shortness of breath. Denies any fevers or abdominal pain. No GI doctor, no recent  Prior EGD: never had  Prior Colonoscopy: never had   In ED BP 94/54 the rest of VT stable, hgb 7.3 s/p 1 U PRBC  Pt admitted for Microcytic Anemia in setting of Melena, Pt evaluated by GI s/p EGD Colonoscopy 6/10 results outlined below    His Eliquis remained on hold during his stay, his hgb remained stable throughout his stay after 1 UPRBC      Patient is a 71-year-old male with history of CAD status post CABG in 2010, paroxysmal A. fib, intermittent heart block s/p PPM placed 4/5/2022 on Eliquis last dose 6/8 AM history of DVTs on Eliquis, CVA in 2007 with no residual symptoms, hypertension, hyperlipidemia referred to the ED by his primary care physician for anemia. Patient here with his daughter. Patient has been feeling fatigued over the last10 days, started to have black stool approximately 2 episodes a day for the last 3 to 4 days. Had outpatient labs on the first significant for hemoglobin of 7 which is new for the patient,  baseline Hb 9.3 in 2022, 14 in 2020.  Referred to the ED for concern of GI bleed. Patient denies any chest pain or shortness of breath. Denies any fevers or abdominal pain. No GI doctor, no recent  Prior EGD: never had  Prior Colonoscopy: never had   In ED BP 94/54 the rest of VT stable, hgb 7.3 s/p 1 U PRBC  Pt admitted for Microcytic Anemia in setting of Melena, Pt evaluated by GI s/p EGD Colonoscopy 6/10 results outlined below:  EGD Impression:  Esophageal hiatal hernia.  Erythema in the stomach compatible with non-erosive gastritis. (Biopsy).  Normal mucosa in the whole examined duodenum. (Biopsy).  Colonoscopy impression:  Large non-bleeding internal hemorrhoids were noted.	  No blood noted in whole colon or terminal ileum.	  2 small non-bleeding AVMs noted in hepatic flexure.. Hemostasis was performed using APCs.  GI recommending:  High fiber diet  Await pathology results  Resume anticoagulation  Video capsule endoscopy as outpatient  Follow up as outpatient with GI office in 4 weeks      Newly diagnosed Pre-Diabetes: Pt instructed to follow up outpatient

## 2022-06-10 NOTE — CHART NOTE - NSCHARTNOTEFT_GEN_A_CORE
PACU ANESTHESIA PACU ADMISSION NOTE      Procedure:  Post op diagnosis    ____ Intubated  TV:______       Rate: ______      FiO2: ______    __x__ Patent Airway    ___x_ Full return of protective reflexes    ____ Full recovery from anesthesia / sedation to baseline status    Viitals:  see anesthesia record            Mental Status:  ___x_ Awake   _____ Alert   _____ Drowsy   _____ Sedated    Nausea/Vomiting: ____ Yes, See Post - Op Orders      ___x_ No    Pain Scale (0-10): _____    Treatment: ____ None    __x__ See Post - Op/PCA Orders    Post - Operative Fluids:   ____ Oral   x____ See Post - Op Orders    Plan:         Discharge:   ____Home       __x___Floor         _____Critical Care    _____Other:_________________    Comments: uneventful perioperative course; no s/s of anesthesia complications noted; D/C floor when criteria met

## 2022-06-10 NOTE — CHART NOTE - NSCHARTNOTEFT_GEN_A_CORE
s/p EGD colonoscopy today    EGD Impression:    	Esophageal hiatal hernia.  	Erythema in the stomach compatible with non-erosive gastritis. (Biopsy).  	Normal mucosa in the whole examined duodenum. (Biopsy).      Colonoscopy impression:    Large non-bleeding internal hemorrhoids were noted.	  No blood noted in whole colon or terminal ileum.	  2 small non-bleeding AVMs noted in hepatic flexure.. Hemostasis was performed using APCs.      Rec:  High fiber diet    Await pathology results    May resume anticoagulation  from GI standpoint  Video capsule endoscopy as outpatient   Follow up as outpatient with GI office in 4 weeks    Discussion about repeat colonoscopy in outpatient setting based on age and comorbidities

## 2022-06-10 NOTE — DISCHARGE NOTE PROVIDER - CARE PROVIDER_API CALL
Patricia Nails)  Gastroenterology; Internal Medicine  [address] [place] [id number]  Phone: [phone]  Fax: [phone]  Follow Up Time: 1 month

## 2022-06-11 ENCOUNTER — TRANSCRIPTION ENCOUNTER (OUTPATIENT)
Age: 72
End: 2022-06-11

## 2022-06-11 VITALS
RESPIRATION RATE: 18 BRPM | SYSTOLIC BLOOD PRESSURE: 109 MMHG | HEART RATE: 68 BPM | TEMPERATURE: 96 F | DIASTOLIC BLOOD PRESSURE: 68 MMHG

## 2022-06-11 LAB
ALBUMIN SERPL ELPH-MCNC: 3.7 G/DL — SIGNIFICANT CHANGE UP (ref 3.5–5.2)
ALP SERPL-CCNC: 107 U/L — SIGNIFICANT CHANGE UP (ref 30–115)
ALT FLD-CCNC: 13 U/L — SIGNIFICANT CHANGE UP (ref 0–41)
ANION GAP SERPL CALC-SCNC: 14 MMOL/L — SIGNIFICANT CHANGE UP (ref 7–14)
AST SERPL-CCNC: 21 U/L — SIGNIFICANT CHANGE UP (ref 0–41)
BASOPHILS # BLD AUTO: 0.02 K/UL — SIGNIFICANT CHANGE UP (ref 0–0.2)
BASOPHILS NFR BLD AUTO: 0.3 % — SIGNIFICANT CHANGE UP (ref 0–1)
BILIRUB SERPL-MCNC: 0.6 MG/DL — SIGNIFICANT CHANGE UP (ref 0.2–1.2)
BUN SERPL-MCNC: 14 MG/DL — SIGNIFICANT CHANGE UP (ref 10–20)
CALCIUM SERPL-MCNC: 8.8 MG/DL — SIGNIFICANT CHANGE UP (ref 8.5–10.1)
CHLORIDE SERPL-SCNC: 103 MMOL/L — SIGNIFICANT CHANGE UP (ref 98–110)
CO2 SERPL-SCNC: 22 MMOL/L — SIGNIFICANT CHANGE UP (ref 17–32)
CREAT SERPL-MCNC: 1.2 MG/DL — SIGNIFICANT CHANGE UP (ref 0.7–1.5)
EGFR: 64 ML/MIN/1.73M2 — SIGNIFICANT CHANGE UP
EOSINOPHIL # BLD AUTO: 0.32 K/UL — SIGNIFICANT CHANGE UP (ref 0–0.7)
EOSINOPHIL NFR BLD AUTO: 4.2 % — SIGNIFICANT CHANGE UP (ref 0–8)
GLUCOSE SERPL-MCNC: 113 MG/DL — HIGH (ref 70–99)
HCT VFR BLD CALC: 29.2 % — LOW (ref 42–52)
HGB BLD-MCNC: 8.9 G/DL — LOW (ref 14–18)
IMM GRANULOCYTES NFR BLD AUTO: 0.3 % — SIGNIFICANT CHANGE UP (ref 0.1–0.3)
LYMPHOCYTES # BLD AUTO: 1.89 K/UL — SIGNIFICANT CHANGE UP (ref 1.2–3.4)
LYMPHOCYTES # BLD AUTO: 24.6 % — SIGNIFICANT CHANGE UP (ref 20.5–51.1)
MAGNESIUM SERPL-MCNC: 1.9 MG/DL — SIGNIFICANT CHANGE UP (ref 1.8–2.4)
MCHC RBC-ENTMCNC: 23 PG — LOW (ref 27–31)
MCHC RBC-ENTMCNC: 30.5 G/DL — LOW (ref 32–37)
MCV RBC AUTO: 75.5 FL — LOW (ref 80–94)
MONOCYTES # BLD AUTO: 0.8 K/UL — HIGH (ref 0.1–0.6)
MONOCYTES NFR BLD AUTO: 10.4 % — HIGH (ref 1.7–9.3)
NEUTROPHILS # BLD AUTO: 4.64 K/UL — SIGNIFICANT CHANGE UP (ref 1.4–6.5)
NEUTROPHILS NFR BLD AUTO: 60.2 % — SIGNIFICANT CHANGE UP (ref 42.2–75.2)
NRBC # BLD: 0 /100 WBCS — SIGNIFICANT CHANGE UP (ref 0–0)
PLATELET # BLD AUTO: 240 K/UL — SIGNIFICANT CHANGE UP (ref 130–400)
POTASSIUM SERPL-MCNC: 4.4 MMOL/L — SIGNIFICANT CHANGE UP (ref 3.5–5)
POTASSIUM SERPL-SCNC: 4.4 MMOL/L — SIGNIFICANT CHANGE UP (ref 3.5–5)
PROT SERPL-MCNC: 6.5 G/DL — SIGNIFICANT CHANGE UP (ref 6–8)
RBC # BLD: 3.87 M/UL — LOW (ref 4.7–6.1)
RBC # FLD: 16.4 % — HIGH (ref 11.5–14.5)
SODIUM SERPL-SCNC: 139 MMOL/L — SIGNIFICANT CHANGE UP (ref 135–146)
WBC # BLD: 7.69 K/UL — SIGNIFICANT CHANGE UP (ref 4.8–10.8)
WBC # FLD AUTO: 7.69 K/UL — SIGNIFICANT CHANGE UP (ref 4.8–10.8)

## 2022-06-11 PROCEDURE — 99238 HOSP IP/OBS DSCHRG MGMT 30/<: CPT

## 2022-06-11 RX ORDER — PANTOPRAZOLE SODIUM 20 MG/1
1 TABLET, DELAYED RELEASE ORAL
Qty: 30 | Refills: 0
Start: 2022-06-11 | End: 2022-07-10

## 2022-06-11 RX ORDER — PANTOPRAZOLE SODIUM 20 MG/1
40 TABLET, DELAYED RELEASE ORAL
Refills: 0 | Status: DISCONTINUED | OUTPATIENT
Start: 2022-06-11 | End: 2022-06-11

## 2022-06-11 RX ADMIN — Medication 10 MILLIGRAM(S): at 05:52

## 2022-06-11 RX ADMIN — Medication 50 MILLIGRAM(S): at 05:52

## 2022-06-11 NOTE — OBJECTIVE
Patient had acute kidney injury.  Creatinine increased from 1.6-2.7. Improved to 2.5. oliguric over last 12 hours. UA and RP ultrasound unrevealing. FeNa demonstrated prerenal etiology with urine Na <10. 1L given with some improvement to 2.5. Concern for ATN given oliguria. Will monitor after IVF bolus and if no improvement can recheck labs.     Nephrology was consulted with concern for likely vanc induced vs contrast induced nephropathy. Repeat studies with Fena and Feurea not consistent and is not likely prerenal given worsening with IVF.     Plan  - Strict I and os  - lasix 160 BID per nephrology, stopped on 6/11 from worsening SCOTT  - If Acidosis worsening can start bicarb tabs   - continue supportive care and avoid any contrasted studies  - nephrology consulted with appreciated recommendations; recommending CRRT   [History reviewed] : History reviewed. [Medications and Allergies reviewed] : Medications and allergies reviewed.

## 2022-06-11 NOTE — DISCHARGE NOTE NURSING/CASE MANAGEMENT/SOCIAL WORK - PATIENT PORTAL LINK FT
You can access the FollowMyHealth Patient Portal offered by  by registering at the following website: [email or web address]. By joining [employer]’s FollowMyHealth portal, you will also be able to view your health information using other applications (apps) compatible with our system.

## 2022-06-11 NOTE — DISCHARGE NOTE NURSING/CASE MANAGEMENT/SOCIAL WORK - NSDCPEFALRISK_GEN_ALL_CORE
For information on Fall & Injury Prevention, visit: [email or web address] OR  [email or web address] OR  https://www.cdc.gov/steadi/patient.html

## 2022-06-11 NOTE — PROGRESS NOTE ADULT - ASSESSMENT
Patient is a 71-year-old male with history of CAD status post CABG in 2010, paroxysmal A. 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 hb 7.3 on outpt labs, baseline 9.3  -BP 94/54 on admission. non tachycardiac however on BB  -never had EGD/Colonoscopy  -GI consulted: Plan for EGD/Colono 6/9  -Protonix gtt  -2 large bore IV  -Trend CBC  -keep active type and screen  -Holding   eliquis (last dose 6/8 AM)  -s/p 1 u of prbc in ED  -clears diet, IVF with LR at 100cc/hr      #Afib   -chadsvasc 4  -hold eliquis  -c/w metoprolol    #DVT on eliquis  -hold eliquis  -repeat VA duplex 6/8: NG > start SCD    #CKDIII- stable  -monitor renal function    #HLD  -c/w statin    #HTN  -c/w metoprolol      - DVT Prophylaxis: none  - GI Prophylaxis: protonix  - Diet: clear  - Activity: as tolerated  - Code Status: Full Code    Hand off: NPO after MN for EGD,COLONO 6/9  
Patient is a 71-year-old male with history of CAD status post CABG in 2010, paroxysmal A. 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 hb 7.3 on outpt labs, baseline 9.3  -BP 94/54 on admission. non tachycardiac however on BB  -never had EGD/Colonoscopy  -GI consulted: Plan for EGD/Colono 6/9  -Protonix gtt  -2 large bore IV  -Trend CBC  -keep active type and screen  -Holding   eliquis (last dose 6/8 AM)  -s/p 1 u of prbc in ED  -clears diet, IVF with LR at 100cc/hr  EGD and colonoscopy today   Follow GI recommendations       #Afib   -chadsvasc 4  -hold eliquis  -c/w metoprolol    #DVT on eliquis  -hold eliquis  -repeat VA duplex 6/8: NG > start SCD    #CKDIII- stable  -monitor renal function    #HLD  -c/w statin    #HTN  -c/w metoprolol      - DVT Prophylaxis: none  - GI Prophylaxis: protonix  - Diet: clear  - Activity: as tolerated  - Code Status: Full Code    Hand off: EGD and colonoscopy today. Follow GI recommendations.   
ARELI PITTMAN 71y Male  MRN#: [id number]   CODE STATUS:Full code       SUBJECTIVE  Patient is a 71y old Male who presents with a chief complaint of anemia (10 Chon 2022 16:54)  Currently admitted to medicine with the primary diagnosis of Anemia  Today is hospital day 3d, and this morning he is resting in bed and reports no overnight events. had breakfast, denies any pain. no BM after the procedure. denies any blood per rectum. feels ok and requesting to go home     OBJECTIVE  PAST MEDICAL & SURGICAL HISTORY  CAD (coronary artery disease)  s/p stenting and CABG 10 years ago    HLD (hyperlipidemia)    Stroke  10 years ago as per MD (2006, 2009, 2019- HAS FORGETFULNESS AND MOOD CHANGES SINCE LAST STROKE)    DVT, lower extremity  January 2019, not currently on anticoagulation    Afib    S/P CABG (coronary artery bypass graft)    History of hernia repair    History of surgery  THROMBECTOMY      ALLERGIES:  No Known Allergies    MEDICATIONS:  STANDING MEDICATIONS  apixaban 5 milliGRAM(s) Oral two times a day  atorvastatin 20 milliGRAM(s) Oral at bedtime  enalapril 10 milliGRAM(s) Oral daily  influenza  Vaccine (HIGH DOSE) 0.7 milliLiter(s) IntraMuscular once  metoprolol succinate ER 50 milliGRAM(s) Oral daily  pantoprazole    Tablet 40 milliGRAM(s) Oral before breakfast    PRN MEDICATIONS      VITAL SIGNS: Last 24 Hours  T(C): 35.6 (11 Jun 2022 08:00), Max: 36.2 (10 Chon 2022 17:24)  T(F): 96 (11 Jun 2022 08:00), Max: 97.1 (10 Chon 2022 17:24)  HR: 68 (11 Jun 2022 08:00) (61 - 75)  BP: 109/68 (11 Jun 2022 08:00) (85/54 - 134/82)  BP(mean): --  RR: 18 (11 Jun 2022 08:00) (16 - 18)  SpO2: 62% (10 Chon 2022 17:39) (62% - 98%)    LABS:                        8.9    7.69  )-----------( 240      ( 11 Jun 2022 06:31 )             29.2     06-11    139  |  103  |  14  ----------------------------<  113<H>  4.4   |  22  |  1.2    Ca    8.8      11 Jun 2022 06:31  Mg     1.9     06-11    TPro  6.5  /  Alb  3.7  /  TBili  0.6  /  DBili  x   /  AST  21  /  ALT  13  /  AlkPhos  107  06-11    PT/INR - ( 09 Jun 2022 21:38 )   PT: 12.20 sec;   INR: 1.06 ratio         PTT - ( 09 Jun 2022 21:38 )  PTT:28.8 sec    RADIOLOGY:      PHYSICAL EXAM:    GENERAL: NAD, well-developed,   HEENT:  Atraumatic, Normocephalic.   neck No JVD  PULMONARY: Clear to auscultation bilaterally;  CARDIOVASCULAR:No murmurs, rubs, or gallops  GASTROINTESTINAL: Soft, Nontender, Nondistended  MUSCULOSKELETAL:  No clubbing, cyanosis, or edema  NEUROLOGY: AAOx3  SKIN: No rashes or lesions    ASSESSMENT & PLAN  71-year-old male with history of CAD status post CABG in 2010, paroxysmal A. 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 non reosive gastritis no active bleed   Video capsule endoscopy as outpatient  stable hemoglobin   - resume Eliquis  - advance diet   - d/c home f/u with GI and PCP    #Afib   -chadsvasc 4  resume  eliquis  -c/w metoprolol    # pre DM f/u with PCP     #DVT on eliquis  -resume eliquis  -repeat VA duplex 6/8: NG > start SCD    #CKDIII- stable  -monitor renal function    #HLD  -c/w statin    #HTN  -c/w metoprolol    discharge today   plan discussed with th  e patient    ID [id number]

## 2022-06-19 NOTE — CDI QUERY NOTE - NSCDIOTHERTXTBX_GEN_ALL_CORE_HH
Documentation:  ** 6/9 Consult Gastroenterology:         - HPI:  referred to the ED by his primary care physician for anemia. Patient here with his daughter. Patient has been feeling fatigued over the last10 days, started to have black stool approximately 2 episodes a day for the last 3 to 4 days.          baseline Hb 9.3 in 2022, 14 in 2020.  Referred to the ED for concern of GI bleed       - Assessment: #)Acute on chronic anemia    ** 6/10 Discharge Summary:      Pt admitted for Microcytic Anemia in setting of Melena      Colonoscopy impression:	2 small non-bleeding AVMs noted in hepatic flexure.. Hemostasis was performed using APCs.    Lab:  Hemoglobin: 7.3 (6/8 @15:50)    Orders:   ** 6/8 Packed Red Cell: 1 unit                                                       Query:  Based on your clinical judgment and consideration of these clinical indicators, please clarify if acute on chronic anemia can be further specified as:  • Acute on chronic blood loss anemia associated with melena.  • Other (please specify).  • Unable to further specify acute on chronic anemia.

## 2022-06-20 ENCOUNTER — APPOINTMENT (OUTPATIENT)
Dept: CARDIOLOGY | Facility: CLINIC | Age: 72
End: 2022-06-20
Payer: MEDICAID

## 2022-06-20 VITALS
OXYGEN SATURATION: 97 % | WEIGHT: 195 LBS | TEMPERATURE: 97.8 F | DIASTOLIC BLOOD PRESSURE: 70 MMHG | BODY MASS INDEX: 31.34 KG/M2 | SYSTOLIC BLOOD PRESSURE: 120 MMHG | HEART RATE: 71 BPM | RESPIRATION RATE: 16 BRPM | HEIGHT: 66 IN

## 2022-06-20 DIAGNOSIS — D68.9 COAGULATION DEFECT, UNSPECIFIED: ICD-10-CM

## 2022-06-20 PROCEDURE — 99072 ADDL SUPL MATRL&STAF TM PHE: CPT

## 2022-06-20 PROCEDURE — 93288 INTERROG EVL PM/LDLS PM IP: CPT

## 2022-06-20 PROCEDURE — 99215 OFFICE O/P EST HI 40 MIN: CPT

## 2022-06-20 RX ORDER — CITALOPRAM HYDROBROMIDE 10 MG/1
10 TABLET, FILM COATED ORAL DAILY
Refills: 0 | Status: DISCONTINUED | COMMUNITY
End: 2022-06-20

## 2022-06-20 NOTE — ASSESSMENT
[FreeTextEntry1] : CVA - right middle cerebral artery thrombectomy - 7/2019 - stable\par HTN - controlled\par h/o CAD - s/p CABG around 2010 - stable\par CVA with parlysis - around 2007 - stable\par NSVT - stable\par Ischemic heart disease -stable\par s/p CABG - 3 vessel - 12 years ago in [place] - stable\par Venous thrombosis - 1/2019 - stable\par Leg pain - stable\par Paroxysmal atrial fibrillaiton - CHADSVASC2 - 4 - stable\par PPM placed 4/5/2022 - stable\par Active GI bleed - recurrent

## 2022-06-20 NOTE — HISTORY OF PRESENT ILLNESS
[FreeTextEntry1] : 71 year old male came in today for evaluation of a CVA 7 months ago, and a blood clot in the leg 1 year ago. The patient had a thrombectomy of the right middle cerebral artery at that time. The patient had a CABG 12 years ago in [place] and a stoke with paralysis of left side 13 years ago.\par The patient denies chest pains, shortness of breath, palpitations, dizziness, loss of consciousness, or swelling of the ankles.\par \par The patient never smoked. The patient denies substance abuse. The patient has HTN. The patient denies DM, or high cholesterol. The patient does snore. The patient does not have difficulty sleeping or dozing off during the day.\par \par The patient complains of left calf pain when he walks about 15 min.  Sometimes he also has it on the right.  It only happens with walking.\par \par The patient had left leg pains 3 weeks ago for left leg pain and was found to be in atrial fibrillation.  The patient today is back in sinus rhythm.  He was started on Eliquis 2.5 mg po bid.\par \par PPM was placed on 4/5/2022.  The patient has improved in his ability to do things but still gets tired with some of his activities.\par \par The patient was hospitalized at [facility] 6/8/2022 - 6/11/2022 for severe anemia.  He had Eliquis stopped and had to have a transfusion and had colonoscopy.  Was found to have hemorrhoids and non-bleeding AVMs.  He was going to have EGD as outpatient.  He had Eliquis decreased to 2.5 mg po bid. and then he began to have black stools again.\par \par PMHX:\par \par CVA - right middle cerebral artery thrombectomy - 7/2019 - stable\par HTN - controlled\par h/o CAD - s/p CABG around 2010 - stable\par CVA with parlysis - around 2007 - stable\par NSVT - stable\par Ischemic heart disease -stable\par s/p CABG - 3 vessel - 12 years ago in [place] - stable\par Venous thrombosis - 1/2019 - stable\par Leg pain - stable\par Paroxysmal atrial fibrillaiton - CHADSVASC2 - 4 - stable\par PPM placed 4/5/2022 - stable\par Active GI bleed - recurrent\par Medications reviewed and reconciled with patient. Patient is compliant with taking appropriate medications at appropriate doses at appropriate intervals without missing doses.

## 2022-06-20 NOTE — DISCUSSION/SUMMARY
[FreeTextEntry1] : Last visit note reviewed.\par Ekg was performed and interpreted. (Atrial sensed, ventricular paced)\par Reviewed hospital D/C summary and Colonoscopy results.\par PPM interrogated and reviewed.\par \par Refer to ED for EGD and possible closure device.  Note even though patient was taking some iron replacement pills he was sent to ED.   He never had EGD and the patient is high risk for CVA from paroxysmal atrial fibrillation.\par \par \par Number/complexity of problems -  High - 1 or more chronic illnesses with severe exacerbation, progression or side effects of treatment\par Amount/complexity of data -history, exam, reviewed old note, labs (see above),  reviewed, ekg reviewed\par Risk of complications - High\par \par

## 2022-06-20 NOTE — PHYSICAL EXAM
[General Appearance - Well Developed] : well developed [Well Groomed] : well groomed [General Appearance - Well Nourished] : well nourished [No Deformities] : no deformities [General Appearance - In No Acute Distress] : no acute distress [Normal Conjunctiva] : the conjunctiva exhibited no abnormalities [Eyelids - No Xanthelasma] : the eyelids demonstrated no xanthelasmas [Normal Oral Mucosa] : normal oral mucosa [No Oral Pallor] : no oral pallor [No Oral Cyanosis] : no oral cyanosis [Normal Jugular Venous A Waves Present] : normal jugular venous A waves present [Normal Jugular Venous V Waves Present] : normal jugular venous V waves present [No Jugular Venous Bello A Waves] : no jugular venous bello A waves [Respiration, Rhythm And Depth] : normal respiratory rhythm and effort [Exaggerated Use Of Accessory Muscles For Inspiration] : no accessory muscle use [Auscultation Breath Sounds / Voice Sounds] : lungs were clear to auscultation bilaterally [Heart Rate And Rhythm] : heart rate and rhythm were normal [Heart Sounds] : normal S1 and S2 [Murmurs] : no murmurs present [Arterial Pulses Normal] : the arterial pulses were normal [Edema] : no peripheral edema present [Abdomen Soft] : soft [Abdomen Tenderness] : non-tender [Abdomen Mass (___ Cm)] : no abdominal mass palpated [Abnormal Walk] : normal gait [Gait - Sufficient For Exercise Testing] : the gait was sufficient for exercise testing [Nail Clubbing] : no clubbing of the fingernails [Cyanosis, Localized] : no localized cyanosis [Petechial Hemorrhages (___cm)] : no petechial hemorrhages [Skin Color & Pigmentation] : normal skin color and pigmentation [] : no rash [No Venous Stasis] : no venous stasis [Skin Lesions] : no skin lesions [No Skin Ulcers] : no skin ulcer [No Xanthoma] : no  xanthoma was observed [Oriented To Time, Place, And Person] : oriented to person, place, and time [Affect] : the affect was normal [Mood] : the mood was normal [No Anxiety] : not feeling anxious [FreeTextEntry1] : Patient appears pale.

## 2022-06-22 ENCOUNTER — APPOINTMENT (OUTPATIENT)
Dept: CARDIOLOGY | Facility: CLINIC | Age: 72
End: 2022-06-22

## 2022-06-22 VITALS
SYSTOLIC BLOOD PRESSURE: 120 MMHG | TEMPERATURE: 98 F | HEART RATE: 71 BPM | DIASTOLIC BLOOD PRESSURE: 70 MMHG | BODY MASS INDEX: 30.86 KG/M2 | HEIGHT: 66 IN | WEIGHT: 192 LBS

## 2022-06-22 PROCEDURE — 99072 ADDL SUPL MATRL&STAF TM PHE: CPT

## 2022-06-22 PROCEDURE — 99215 OFFICE O/P EST HI 40 MIN: CPT | Mod: 24

## 2022-06-22 PROCEDURE — 93000 ELECTROCARDIOGRAM COMPLETE: CPT | Mod: 59

## 2022-06-22 PROCEDURE — 93280 PM DEVICE PROGR EVAL DUAL: CPT

## 2022-07-05 ENCOUNTER — LABORATORY RESULT (OUTPATIENT)
Age: 72
End: 2022-07-05

## 2022-07-06 ENCOUNTER — FORM ENCOUNTER (OUTPATIENT)
Age: 72
End: 2022-07-06

## 2022-07-07 ENCOUNTER — OUTPATIENT (OUTPATIENT)
Dept: OUTPATIENT SERVICES | Facility: HOSPITAL | Age: 72
LOS: 1 days | Discharge: HOME | End: 2022-07-07

## 2022-07-07 DIAGNOSIS — Z98.890 OTHER SPECIFIED POSTPROCEDURAL STATES: Chronic | ICD-10-CM

## 2022-07-07 DIAGNOSIS — Z95.1 PRESENCE OF AORTOCORONARY BYPASS GRAFT: Chronic | ICD-10-CM

## 2022-07-07 DIAGNOSIS — I48.0 PAROXYSMAL ATRIAL FIBRILLATION: ICD-10-CM

## 2022-07-07 PROCEDURE — 93325 DOPPLER ECHO COLOR FLOW MAPG: CPT | Mod: 26

## 2022-07-07 PROCEDURE — 93320 DOPPLER ECHO COMPLETE: CPT | Mod: 26

## 2022-07-07 PROCEDURE — 93312 ECHO TRANSESOPHAGEAL: CPT | Mod: 26,XU

## 2022-07-07 RX ORDER — MECLIZINE HCL 12.5 MG
0 TABLET ORAL
Qty: 0 | Refills: 0 | DISCHARGE

## 2022-07-07 NOTE — H&P CARDIOLOGY - NSICDXPASTMEDICALHX_GEN_ALL_CORE_FT
PAST MEDICAL HISTORY:  Afib     CAD (coronary artery disease) s/p stenting and CABG 10 years ago    DVT, lower extremity January 2019    HLD (hyperlipidemia)     Stroke 10 years ago as per MD (2006, 2009, 2019- HAS FORGETFULNESS AND MOOD CHANGES SINCE LAST STROKE)

## 2022-07-07 NOTE — ASU PATIENT PROFILE, ADULT - FALL HARM RISK - UNIVERSAL INTERVENTIONS
Bed in lowest position, wheels locked, appropriate side rails in place/Call bell, personal items and telephone in reach/Instruct patient to call for assistance before getting out of bed or chair/Non-slip footwear when patient is out of bed/[place] to call system/Physically safe environment - no spills, clutter or unnecessary equipment/Purposeful Proactive Rounding/Room/bathroom lighting operational, light cord in reach

## 2022-07-07 NOTE — CHART NOTE - NSCHARTNOTEFT_GEN_A_CORE
POST OPERATIVE PROCEDURAL DOCUMENTATION  PRE-OP DIAGNOSIS:  Evaluation of AFSHIN    POST-OP DIAGNOSIS:  no evidence of AFSHIN thrombus or smoke  Normal velocities   no evidence of AFSHIN closure     PROCEDURE: Transesophageal echocardiogram    Primary Physician: Dr. Espinosa  Assistant: Dr. Romero    ANESTHESIA TYPE  [  ] General Anesthesia  [ x ] Conscious Sedation  [  ] Local/Regional    CONDITION  [  ] Critical  [  ] Serious  [  ] Fair  [ x ] Good    SPECIMENS REMOVED (IF APPLICABLE): N/A    IMPLANTS (IF APPLICABLE): None    ESTIMATED BLOOD LOSS: None    COMPLICATIONS: None      FINDINGS:    After risks and benefits of procedures were explained, informed consent was obtained and placed in chart. Refer to Anesthesia note for sedation details.  The EDEN probe was passed into the esophagus without difficulty.  Transesophageal and transgastric images were obtained.  The EDEN probe was removed without difficulty and examined.  There was no evidence for bleeding.  The patient tolerated the procedure well without any immediate EDEN-related complications.      Preliminary Findings:  LA: enlarged   AFSHIN: Left atrial appendage was clear of clot and smoke. normal velocities   LV: LVEF was estimated at 60-65%  MV: mild MR  AV: Sclerotic with normal opening. No evidence of AI, no evidence of AS.   RA: Normal   TV: mild TR.   PV: no PI.   IAS: no PFO. No R-> L shunt.   There was mild, non-mobile atheroma seen in the thoracic aorta.         DIAGNOSIS/IMPRESSION:  no evidence of AFSHIN thrombus or smoke  Normal velocities   no evidence of AFSHIN closure     PLAN OF CARE:  cont with current home medication including Eliquis  Discharge home today after recovery   F/u with Dr. Moore as outpatient for watchman's device evaluation

## 2022-07-07 NOTE — H&P CARDIOLOGY - HISTORY OF PRESENT ILLNESS
HPI  71-year-old male with history of CAD status post CABG in 2010, paroxysmal A. fib, intermittent heart block s/p PPM placed 4/5/2022 on Eliquis , history of DVTs on Eliquis, CVA in 2007 with no residual symptoms, hypertension, hyperlipidemia presents for elective EDEN. Patient was recently admitted for acute anemia and GI bleed. patient saw EP as outpatient and requested EDEN to evaluate for AFSHIN and possible need for watchman.     REVIEW OF SYSTEMS:  CONSTITUTIONAL: No weakness, fevers or chills  EYES/ENT: No visual changes;  No vertigo or throat pain   NECK: No pain or stiffness  RESPIRATORY: No cough, wheezing, hemoptysis; SEE HPI  CARDIOVASCULAR: SEE HPI  GASTROINTESTINAL: No abdominal or epigastric pain. No nausea, vomiting, or hematemesis; No diarrhea or constipation. No melena or hematochezia.  GENITOURINARY: No dysuria, frequency or hematuria  NEUROLOGICAL: No numbness or weakness  SKIN: No itching, rashes      PHYSICAL EXAM:  GENERAL: NAD  HEAD:  Atraumatic, Normocephalic  EYES: conjunctiva and sclera clear  NECK: No JVD  CHEST/LUNG: Clear to auscultation bilaterally; No wheeze  HEART: Regular rate and rhythm; No murmurs  ABDOMEN: Soft, Nontender, Nondistended; Bowel sounds present  EXTREMITIES:  2+ Peripheral Pulses, No clubbing, cyanosis, or edema  NEUROLOGY:  A&Ox3, appropriate  SKIN: No rashes or lesions

## 2022-07-12 ENCOUNTER — NON-APPOINTMENT (OUTPATIENT)
Age: 72
End: 2022-07-12

## 2022-07-13 ENCOUNTER — APPOINTMENT (OUTPATIENT)
Dept: CARDIOLOGY | Facility: CLINIC | Age: 72
End: 2022-07-13

## 2022-07-13 PROCEDURE — 93296 REM INTERROG EVL PM/IDS: CPT

## 2022-07-13 PROCEDURE — 93294 REM INTERROG EVL PM/LDLS PM: CPT

## 2022-07-16 PROBLEM — I82.409 ACUTE EMBOLISM AND THROMBOSIS OF UNSPECIFIED DEEP VEINS OF UNSPECIFIED LOWER EXTREMITY: Chronic | Status: ACTIVE | Noted: 2019-07-12

## 2022-07-16 NOTE — PHYSICAL EXAM
[General Appearance - Well Developed] : well developed [Normal Appearance] : normal appearance [Well Groomed] : well groomed [General Appearance - Well Nourished] : well nourished [No Deformities] : no deformities [General Appearance - In No Acute Distress] : no acute distress [Clean] : clean [Dry] : dry [Well-Healed] : well-healed [Well Developed] : well developed [Well Nourished] : well nourished [No Acute Distress] : no acute distress [Normal Conjunctiva] : normal conjunctiva [Normal Venous Pressure] : normal venous pressure [No Carotid Bruit] : no carotid bruit [Normal S1, S2] : normal S1, S2 [No Murmur] : no murmur [No Rub] : no rub [No Gallop] : no gallop [Clear Lung Fields] : clear lung fields [Good Air Entry] : good air entry [No Respiratory Distress] : no respiratory distress  [Soft] : abdomen soft [Non Tender] : non-tender [No Masses/organomegaly] : no masses/organomegaly [Normal Bowel Sounds] : normal bowel sounds [Normal Gait] : normal gait [No Edema] : no edema [No Cyanosis] : no cyanosis [No Clubbing] : no clubbing [No Varicosities] : no varicosities [No Rash] : no rash [No Skin Lesions] : no skin lesions [Moves all extremities] : moves all extremities [No Focal Deficits] : no focal deficits [Normal Speech] : normal speech [Alert and Oriented] : alert and oriented [Normal memory] : normal memory

## 2022-07-16 NOTE — ASSESSMENT
[FreeTextEntry1] : ## High degree AV block s/p DC-PPM (MDT, 22, Dep)\par ## Severe 1st degree AV block-symptomatic\par ## paroxysmal atrial fibrillation \par ## Severe Anemia\par \par - PPM interrogation shows normally functioning DC-PPM. Battery life ok. No new events. \par - Severe anemia s/p blood transfusion. Awaiting repeat labs since DC. No more bleeding. EGD: erosive gastritis, colonoscopy: Non-bleeding AVM in colon, internal hemorrhoids.\par - - On Eliquis. Compliant. No bleeding issues. Patient to contact us if there is any bleeding issues, interruption or any issues with OAC. Patient to go to ER/call 911 if any major bleeding. \par - repeat CBC with PCP\par - GI/Hem-onc f-up\par - Trial of OAC\par - Patient is at increased risk of stroke based on CHADSVASC score. She is a good candidate for Watchman implant as an alternative to anticoagulation as had complications with anticoagulation.\par \par I have discussed different treatment options with the patient including other anticoagulation medication. I have explained the risks and benefits of the procedure to the patient. I have explained to the patient the patient will require to be on anticoagulation for 45 days after implant and EDEN will be repeated. If there is no leak patient will remain on aspirin and Plavix for next month, and then ASA only. There is approximately 1-2% chance of any major cardiovascular complication to occur. Complications include, but are not limited to infection, bleeding, and damage to the vessels, hole in the heart, stroke, death and heart attack. The patient understands the risk and would like to proceed with the procedure. Materials were provided to the patient. Patient indicated that all of his questions were answered to his satisfaction and verbalized understanding.\par \par Referring PCP/Cardiologist have discussed and recommended this to the patient/family and agrees with implant of watchman.\par \par I recommend to continue anticoagulation for now. \par \par Timing to be decided based on further work-up.\par \par - Remote Monitoring\par - Return in 1 month \par

## 2022-07-16 NOTE — PROCEDURE
[Complete Heart Block] : complete heart block [Pacemaker] : pacemaker [DDDR] : DDDR [Longevity: ___ months] : The estimated remaining battery life is [unfilled] months [Lead Imp:  ___ohms] : lead impedance was [unfilled] ohms [Sensing Amplitude ___mv] : sensing amplitude was [unfilled] mv [___V @] : [unfilled] V [___ ms] : [unfilled] ms [Programmed for Longevity] : output reprogrammed for improved battery longevity [de-identified] : [employer] [de-identified] : KELLEY [de-identified] : [id number] [de-identified] : 4-5-22 [de-identified] : 60 [de-identified] : AP 96.8%\par  98.6%\par no events

## 2022-07-16 NOTE — HISTORY OF PRESENT ILLNESS
[FreeTextEntry1] : I had a pleasure of seeing Mr. PITTMAN for consultation for high degree AV block. He is accompanied by his wife. Speaks Malagasy. \par \par Mr. PITTMAN is a 71 year-year old male with history of CAD/CABG, CVA post CBAG, HTN, nSVT, paroxysmal atrial fibrillation is here for high degree AV block.\par \par + High degree AV block on monitor0 with symptoms\par + Fatigue\par \par 06/22/22: Feels fine. s/p PPM-feels great after that. Had severe anemia- needed blood transfusion. Had colono + EGD without any significant findings. No dark stool or bleeding anymore.\par \par Denies chest pain, shortness of breath, palpitation, dizziness or LOC except noted above.\par \par EKG (06/22/22): SR-@75\par EKG (3/23/22): SR@ 53, , QRSd 86\par TTE (03/22): Nl EF, Mod LAE\par Cardio: Dr. Quiles\par

## 2022-07-27 ENCOUNTER — APPOINTMENT (OUTPATIENT)
Dept: CARDIOLOGY | Facility: CLINIC | Age: 72
End: 2022-07-27

## 2022-07-27 VITALS
WEIGHT: 194 LBS | DIASTOLIC BLOOD PRESSURE: 72 MMHG | BODY MASS INDEX: 31.18 KG/M2 | SYSTOLIC BLOOD PRESSURE: 122 MMHG | OXYGEN SATURATION: 68 % | TEMPERATURE: 97.8 F | HEIGHT: 66 IN

## 2022-07-27 PROCEDURE — 99072 ADDL SUPL MATRL&STAF TM PHE: CPT

## 2022-07-27 PROCEDURE — 93000 ELECTROCARDIOGRAM COMPLETE: CPT | Mod: 59

## 2022-07-27 PROCEDURE — 99215 OFFICE O/P EST HI 40 MIN: CPT

## 2022-07-27 PROCEDURE — 93280 PM DEVICE PROGR EVAL DUAL: CPT

## 2022-07-27 NOTE — HISTORY OF PRESENT ILLNESS
[FreeTextEntry1] : I had a pleasure of seeing Mr. PITTMAN for consultation for high degree AV block. He is accompanied by his wife. Speaks Italian. \par \par Mr. PITTMAN is a 71 year-year old male with history of CAD/CABG, CVA post CBAG, HTN, nSVT, paroxysmal atrial fibrillation is here for high degree AV block.\par \par + High degree AV block on monitor0 with symptoms\par + Fatigue\par \par 06/22/22: Feels fine. s/p PPM-feels great after that. Had severe anemia- needed blood transfusion. Had colono + EGD without any significant findings. No dark stool or bleeding anymore.\par \par 07/27/22: Feels fine. No more bleeding. To see GI for capsule endoscopy in 1st week of Aug. Last Hgb 9.3 (2021- in 12s/13s). \par He is accompanied by his daughter, who speaks English.\par \par Denies chest pain, shortness of breath, palpitation, dizziness or LOC except noted above.\par \par EKG (07/27/22): SR-\par EKG (06/22/22): SR-@75\par EKG (3/23/22): SR@ 53, , QRSd 86\par TTE (03/22): Nl EF, Mod LAE\par Cardio: Dr. Quiles\par

## 2022-07-27 NOTE — ASSESSMENT
[FreeTextEntry1] : ## High degree AV block s/p DC-PPM (MDT, 22, Dep)\par ## Severe 1st degree AV block-symptomatic\par ## paroxysmal atrial fibrillation \par ## Severe Anemia\par \par - PPM interrogation shows normally functioning DC-PPM. Battery life ok. No new events. \par - Severe anemia s/p blood transfusion. Awaiting repeat labs since DC. No more bleeding. EGD: erosive gastritis, colonoscopy: Non-bleeding AVM in colon, internal hemorrhoids.\par - - On Eliquis. Compliant. No bleeding issues. Patient to contact us if there is any bleeding issues, interruption or any issues with OAC. Patient to go to ER/call 911 if any major bleeding. \par - repeat CBC with PCP\par - GI/Hem-onc f-up\par - Patient is at increased risk of stroke based on CHADSVASC score. He is a good candidate for Watchman implant as an alternative to anticoagulation as had complications with anticoagulation.\par \par I have discussed different treatment options with the patient including other anticoagulation medication. I have explained the risks and benefits of the procedure to the patient. I have explained to the patient the patient will require to be on anticoagulation for 45 days after implant and EDEN will be repeated. If there is no leak patient will remain on aspirin and Plavix for next month, and then ASA only. There is approximately 1-2% chance of any major cardiovascular complication to occur. Complications include, but are not limited to infection, bleeding, and damage to the vessels, hole in the heart, stroke, death and heart attack. The patient understands the risk and would like to proceed with the procedure. Materials were provided to the patient. Patient indicated that all of his questions were answered to his satisfaction and verbalized understanding.\par \par Referring PCP/Cardiologist have discussed and recommended this to the patient/family and agrees with implant of watchman.\par \par I recommend to continue anticoagulation for now. \par \par Timing to be decided based on further work-up.\par \par - Remote Monitoring\par - Return in 3 months\par

## 2022-07-27 NOTE — PROCEDURE
[No] : not [Sinus Bradycardia] : sinus bradycardia [See Device Printout] : See device printout [Pacemaker] : pacemaker [Voltage: ___ volts] : Voltage was [unfilled] volts [Longevity: ___ months] : The estimated remaining battery life is [unfilled] months [Threshold Testing Performed] : Threshold testing was performed [Lead Imp:  ___ohms] : lead impedance was [unfilled] ohms [Sensing Amplitude ___mv] : sensing amplitude was [unfilled] mv [___V @] : [unfilled] V [___ ms] : [unfilled] ms [de-identified] : ROSHAN [de-identified] : Anusha [id number] [de-identified] : [id number] [de-identified] : 4/5/22 [de-identified] : AAIR<=>DDDR [de-identified] : AP/ 85.3//99.0%\par AF burden 9.5%\par Longest episode of AF 51 hour 52 min (v-rates well controlled).

## 2022-08-03 ENCOUNTER — APPOINTMENT (OUTPATIENT)
Dept: CARDIOLOGY | Facility: CLINIC | Age: 72
End: 2022-08-03

## 2022-08-03 VITALS
HEIGHT: 66 IN | SYSTOLIC BLOOD PRESSURE: 118 MMHG | RESPIRATION RATE: 16 BRPM | BODY MASS INDEX: 31.82 KG/M2 | WEIGHT: 198 LBS | OXYGEN SATURATION: 97 % | TEMPERATURE: 97.6 F | DIASTOLIC BLOOD PRESSURE: 80 MMHG | HEART RATE: 66 BPM

## 2022-08-03 DIAGNOSIS — I45.5 OTHER SPECIFIED HEART BLOCK: ICD-10-CM

## 2022-08-03 DIAGNOSIS — I47.2 VENTRICULAR TACHYCARDIA: ICD-10-CM

## 2022-08-03 PROCEDURE — 99214 OFFICE O/P EST MOD 30 MIN: CPT

## 2022-08-03 PROCEDURE — 93280 PM DEVICE PROGR EVAL DUAL: CPT

## 2022-08-03 RX ORDER — FERROUS GLUCONATE 324(37.5)
324 (37.5 FE) TABLET ORAL DAILY
Qty: 90 | Refills: 0 | Status: DISCONTINUED | COMMUNITY
Start: 2022-06-20 | End: 2022-08-03

## 2022-08-03 RX ORDER — ASPIRIN 81 MG/1
81 TABLET, COATED ORAL
Qty: 30 | Refills: 0 | Status: DISCONTINUED | COMMUNITY
Start: 2022-07-12 | End: 2022-08-03

## 2022-08-03 RX ORDER — ACETAMINOPHEN EXTRA STRENGTH 500 MG/1
500 TABLET ORAL
Qty: 60 | Refills: 0 | Status: DISCONTINUED | COMMUNITY
Start: 2022-02-04 | End: 2022-08-03

## 2022-08-03 NOTE — DISCUSSION/SUMMARY
[FreeTextEntry1] : Last visit note reviewed.\par Ekg was performed and interpreted. (Atrial sensed, ventricular paced)\par Reviewed EPS notes\par PPM interrogated and reviewed - lead voltages programmed to improve battery longevity.\par \par Fasting CBC, BMP, LFTs, Lipid Profile, HgbA1c, CRP, UA, Urine Microalbumin, Mg, Ca, TSH, T3, T4 prior to next visit\par f/u EPS for Watchman or Amulet\par f/u 3 months\par Patient continuing for now on Eliiquis due to high risk of embolizzation due to Atrial fib.\par \par Number/complexity of problems -  Mod - 1 or more chronic illnesses with exacerbation, progression or side effects of treatment\par Amount/complexity of data -history, exam, reviewed old note, labs (see above),  reviewed, ekg reviewed, PPM evaluated and programmed\par Risk of complications - Mod\par \par \par \par

## 2022-08-03 NOTE — HISTORY OF PRESENT ILLNESS
[FreeTextEntry1] : 72 year old male came in today for evaluation of a CVA 7 months ago, and a blood clot in the leg 1 year ago. The patient had a thrombectomy of the right middle cerebral artery at that time. The patient had a CABG 12 years ago in [place] and a stoke with paralysis of left side 13 years ago.\par The patient denies chest pains, shortness of breath, palpitations, dizziness, loss of consciousness, or swelling of the ankles.\par \par The patient never smoked. The patient denies substance abuse. The patient has HTN. The patient denies DM, or high cholesterol. The patient does snore. The patient does not have difficulty sleeping or dozing off during the day.\par \par The patient complains of left calf pain when he walks about 15 min.  Sometimes he also has it on the right.  It only happens with walking.\par \par The patient had left leg pains 3 weeks ago for left leg pain and was found to be in atrial fibrillation.  The patient today is back in sinus rhythm.  He was started on Eliquis 2.5 mg po bid.\par \par PPM was placed on 4/5/2022.  The patient has improved in his ability to do things but still gets tired with some of his activities.\par \par The patient was hospitalized at [facility] 6/8/2022 - 6/11/2022 for severe anemia.  He had Eliquis stopped and had to have a transfusion and had colonoscopy.  Was found to have hemorrhoids and non-bleeding AVMs.  He was going to have EGD as outpatient.  He had Eliquis decreased to 2.5 mg po bid. and then he began to have black stools again.\par \par The patient was found to have no further gastritis and had non-bleeding AVM in colon with hemorrhoids.  He is awaiting capsule endoscopy.  \par \par PMHX:\par \par CVA - right middle cerebral artery thrombectomy - 7/2019 - stable\par HTN - controlled\par h/o CAD - s/p CABG around 2010 - stable\par CVA with parlysis - around 2007 - stable\par NSVT - stable\par Ischemic heart disease -stable\par s/p CABG - 3 vessel - 12 years ago in [place] - stable\par Venous thrombosis - 1/2019 - stable\par Leg pain - stable\par Paroxysmal atrial fibrillaiton - CHADSVASC2 - 4 - stable\par PPM placed 4/5/2022 - stable\par Active GI bleed - recurrent\par \par Medications reviewed and reconciled with patient. Patient is compliant with taking appropriate medications at appropriate doses at appropriate intervals without missing doses.

## 2022-08-08 ENCOUNTER — APPOINTMENT (OUTPATIENT)
Dept: GASTROENTEROLOGY | Facility: CLINIC | Age: 72
End: 2022-08-08
Payer: MEDICAID

## 2022-08-08 VITALS — HEIGHT: 66 IN | BODY MASS INDEX: 31.18 KG/M2 | WEIGHT: 194 LBS

## 2022-08-08 DIAGNOSIS — D50.9 IRON DEFICIENCY ANEMIA, UNSPECIFIED: ICD-10-CM

## 2022-08-08 DIAGNOSIS — D64.9 ANEMIA, UNSPECIFIED: ICD-10-CM

## 2022-08-08 PROCEDURE — 99214 OFFICE O/P EST MOD 30 MIN: CPT

## 2022-08-08 PROCEDURE — 99072 ADDL SUPL MATRL&STAF TM PHE: CPT

## 2022-08-08 NOTE — ASSESSMENT
[FreeTextEntry1] : 71 yo M\par Anemia\par No overt Gi bleed\par \par Plan\par VCE\par Iron studies\par hematology referal

## 2022-08-08 NOTE — REASON FOR VISIT
[Consultation] : a consultation visit [Family Member] : family member [FreeTextEntry1] :  NPA HFU -Post EGD/COLON

## 2022-08-08 NOTE — PHYSICAL EXAM
[General Appearance - Alert] : alert [General Appearance - In No Acute Distress] : in no acute distress [General Appearance - Well Nourished] : well nourished [Outer Ear] : the ears and nose were normal in appearance [Neck Appearance] : the appearance of the neck was normal [] : no respiratory distress [Abdomen Soft] : soft [Abdomen Tenderness] : non-tender [No CVA Tenderness] : no ~M costovertebral angle tenderness [Abnormal Walk] : normal gait [Skin Color & Pigmentation] : normal skin color and pigmentation [Oriented To Time, Place, And Person] : oriented to person, place, and time

## 2022-08-08 NOTE — HISTORY OF PRESENT ILLNESS
[_________] : Performed [unfilled] [de-identified] : Patient is a 72-year-old male with history of CAD status post CABG in 2010,\par paroxysmal A. fib, intermittent heart block s/p PPM placed 4/5/2022 on Eliquis\par last dose 6/8 AM history of DVTs on Eliquis, CVA in 2007 with no residual\par symptoms, hypertension, hyperlipidemia referred to the ED by his primary care\par physician for anemia. Patient here with his daughter. Patient has been feeling\par fatigued over the last10 days, started to have black stool approximately 2\par episodes a day for the last 3 to 4 days. Had outpatient labs on the first\par significant for hemoglobin of 7 which is new for the patient,\par baseline Hb 9.3 in 2022, 14 in 2020.  Status post EGD and colonoscopy at the hospital back and June upper endoscopy unremarkable no H. pylori.  2 subcentimeter nonbleeding AVMs or APC in the colon.  Last hemoglobin in the range between 8 and 9.  Denies any overt GI bleeding continues to be on Eliquis

## 2022-08-10 ENCOUNTER — APPOINTMENT (OUTPATIENT)
Dept: CARDIOLOGY | Facility: CLINIC | Age: 72
End: 2022-08-10

## 2022-09-05 ENCOUNTER — LABORATORY RESULT (OUTPATIENT)
Age: 72
End: 2022-09-05

## 2022-09-07 ENCOUNTER — OUTPATIENT (OUTPATIENT)
Dept: OUTPATIENT SERVICES | Facility: HOSPITAL | Age: 72
LOS: 1 days | Discharge: HOME | End: 2022-09-07

## 2022-09-07 VITALS
SYSTOLIC BLOOD PRESSURE: 118 MMHG | RESPIRATION RATE: 18 BRPM | HEART RATE: 79 BPM | WEIGHT: 195.11 LBS | HEIGHT: 70 IN | DIASTOLIC BLOOD PRESSURE: 73 MMHG | TEMPERATURE: 97 F

## 2022-09-07 DIAGNOSIS — D64.9 ANEMIA, UNSPECIFIED: ICD-10-CM

## 2022-09-07 DIAGNOSIS — Z95.1 PRESENCE OF AORTOCORONARY BYPASS GRAFT: Chronic | ICD-10-CM

## 2022-09-07 DIAGNOSIS — Z98.890 OTHER SPECIFIED POSTPROCEDURAL STATES: Chronic | ICD-10-CM

## 2022-09-07 PROCEDURE — 91110 GI TRC IMG INTRAL ESOPH-ILE: CPT | Mod: 26

## 2022-09-07 NOTE — ASU PATIENT PROFILE, ADULT - TOBACCO USE
Spoke with patient.     Reports about 2 weeks ago he noticed jaw pain for about a day along with some fatigue. A couple of days later he had loss of taste and smell, which also only lasted a couple of days.   Patient feels he might of been exposed from a couple of friends, but symptoms were no more than what is listed above.     Patient denies any current/active symptoms. No fever, cough or headache, body ache.   Whishes to have a Covid antibody test added to his labs for 1/27/21 to see if he has ever really had Covid.     Ok to order? Patient will follow up on 2/3/21 as scheduled.    Never smoker

## 2022-09-07 NOTE — ASU PATIENT PROFILE, ADULT - LANGUAGE ASSISTANCE NEEDED
No-Patient/Caregiver offered and refused free interpretation services. #[id number]/No-Patient/Caregiver offered and refused free interpretation services.

## 2022-09-28 ENCOUNTER — APPOINTMENT (OUTPATIENT)
Dept: CARDIOLOGY | Facility: CLINIC | Age: 72
End: 2022-09-28
Payer: MEDICAID

## 2022-09-28 VITALS
HEART RATE: 73 BPM | WEIGHT: 195 LBS | OXYGEN SATURATION: 97 % | BODY MASS INDEX: 31.34 KG/M2 | SYSTOLIC BLOOD PRESSURE: 114 MMHG | DIASTOLIC BLOOD PRESSURE: 70 MMHG | HEIGHT: 66 IN

## 2022-09-28 PROCEDURE — 99072 ADDL SUPL MATRL&STAF TM PHE: CPT

## 2022-09-28 PROCEDURE — 99214 OFFICE O/P EST MOD 30 MIN: CPT

## 2022-09-28 PROCEDURE — 93000 ELECTROCARDIOGRAM COMPLETE: CPT | Mod: 59

## 2022-09-28 PROCEDURE — 93280 PM DEVICE PROGR EVAL DUAL: CPT

## 2022-09-28 RX ORDER — DICLOFENAC SODIUM 10 MG/G
1 GEL TOPICAL
Refills: 5 | Status: DISCONTINUED | COMMUNITY
End: 2022-09-28

## 2022-09-28 RX ORDER — APIXABAN 2.5 MG/1
2.5 TABLET, FILM COATED ORAL
Qty: 60 | Refills: 0 | Status: DISCONTINUED | COMMUNITY
Start: 2022-02-04 | End: 2022-09-28

## 2022-10-03 ENCOUNTER — APPOINTMENT (OUTPATIENT)
Dept: GASTROENTEROLOGY | Facility: CLINIC | Age: 72
End: 2022-10-03

## 2022-10-17 ENCOUNTER — APPOINTMENT (OUTPATIENT)
Dept: GASTROENTEROLOGY | Facility: CLINIC | Age: 72
End: 2022-10-17

## 2022-10-17 DIAGNOSIS — K92.2 GASTROINTESTINAL HEMORRHAGE, UNSPECIFIED: ICD-10-CM

## 2022-10-17 PROCEDURE — 99443: CPT

## 2022-10-17 NOTE — ASSESSMENT
[FreeTextEntry1] : ## High degree AV block s/p DC-PPM (MDT, 22, Dep)\par ## Severe 1st degree AV block-symptomatic\par ## paroxysmal atrial fibrillation \par ## Severe Anemia\par \par - PPM interrogation shows normally functioning DC-PPM. Battery life ok. No new events. \par - Severe anemia s/p blood transfusion. No more bleeding. EGD: erosive gastritis, colonoscopy: Non-bleeding AVM in colon, internal hemorrhoids. Awaiting Capsule endoscopy results.\par - - On Eliquis. Compliant. No bleeding issues. Patient to contact us if there is any bleeding issues, interruption or any issues with OAC. Patient to go to ER/call 911 if any major bleeding. \par - GI/Hem-onc f-up\par - Patient is at increased risk of stroke based on CHADSVASC score. He is a good candidate for Watchman implant as an alternative to anticoagulation as had complications with anticoagulation.\par \par I have discussed different treatment options with the patient including other anticoagulation medication. I have explained the risks and benefits of the procedure to the patient. I have explained to the patient the patient will require to be on anticoagulation for 45 days after implant and EDEN will be repeated. If there is no leak patient will remain on aspirin and Plavix for next month, and then ASA only. There is approximately 1-2% chance of any major cardiovascular complication to occur. Complications include, but are not limited to infection, bleeding, and damage to the vessels, hole in the heart, stroke, death and heart attack. The patient understands the risk and would like to proceed with the procedure. Materials were provided to the patient. Patient indicated that all of his questions were answered to his satisfaction and verbalized understanding.\par \par Referring PCP/Cardiologist- Dr. Quiles have discussed and recommended this to the patient/family and agrees with implant of watchman.\par \par I recommend to continue anticoagulation for now.  Need for LAAC to be decided based upon GI work-up\par \par - Remote Monitoring\par - Return in 3 months\par - D/w Daughter over the phone\par

## 2022-10-17 NOTE — HISTORY OF PRESENT ILLNESS
[FreeTextEntry1] : I had a pleasure of seeing Mr. PITTMAN for consultation for high degree AV block. He is accompanied by his wife. Speaks Icelandic. \par \par Mr. PITTMAN is a 71 year-year old male with history of CAD/CABG, CVA post CBAG, HTN, nSVT, paroxysmal atrial fibrillation is here for high degree AV block.\par \par + High degree AV block on monitor0 with symptoms\par + Fatigue\par \par 06/22/22: Feels fine. s/p PPM-feels great after that. Had severe anemia- needed blood transfusion. Had colono + EGD without any significant findings. No dark stool or bleeding anymore.\par \par 07/27/22: Feels fine. No more bleeding. To see GI for capsule endoscopy in 1st week of Aug. Last Hgb 9.3 (2021- in 12s/13s). \par He is accompanied by his daughter, who speaks English.\par \par 09/28/22: Feels fine. Undergoing GI work-up- awaiting results of capsule endoscopy\par \par Denies chest pain, shortness of breath, palpitation, dizziness or LOC except noted above.\par \par EKG (09/28/22): SR-\par EKG (07/27/22): SR-\par EKG (06/22/22): SR-@75\par EKG (3/23/22): SR@ 53, , QRSd 86\par TTE (03/22): Nl EF, Mod LAE\par Cardio: Dr. Quiles\par

## 2022-10-17 NOTE — PHYSICAL EXAM
[General Appearance - Well Developed] : well developed [Well Groomed] : well groomed [Normal Appearance] : normal appearance [General Appearance - Well Nourished] : well nourished [No Deformities] : no deformities [General Appearance - In No Acute Distress] : no acute distress [Clean] : clean [Dry] : dry [Well-Healed] : well-healed [Well Developed] : well developed [Well Nourished] : well nourished [No Acute Distress] : no acute distress [Normal Conjunctiva] : normal conjunctiva [Normal Venous Pressure] : normal venous pressure [No Carotid Bruit] : no carotid bruit [Normal S1, S2] : normal S1, S2 [No Murmur] : no murmur [No Rub] : no rub [No Gallop] : no gallop [Clear Lung Fields] : clear lung fields [Good Air Entry] : good air entry [No Respiratory Distress] : no respiratory distress  [Soft] : abdomen soft [Non Tender] : non-tender [No Masses/organomegaly] : no masses/organomegaly [Normal Bowel Sounds] : normal bowel sounds [Normal Gait] : normal gait [No Edema] : no edema [No Cyanosis] : no cyanosis [No Clubbing] : no clubbing [No Varicosities] : no varicosities [No Rash] : no rash [No Skin Lesions] : no skin lesions [Moves all extremities] : moves all extremities [No Focal Deficits] : no focal deficits [Normal Speech] : normal speech [Alert and Oriented] : alert and oriented [Normal memory] : normal memory

## 2022-10-17 NOTE — HISTORY OF PRESENT ILLNESS
[FreeTextEntry4] : Imelda [de-identified] : 09/07/22 [_________] : Performed [unfilled] [de-identified] : SP VCE: non bleeding AVMs in the proximal jejunum, Continues to be on eliquis. No melena or hematochezia. No complaints. Evaluated by EP for a watchman device [de-identified] : Patient is a 72-year-old male with history of CAD status post CABG in 2010,\par paroxysmal A. fib, intermittent heart block s/p PPM placed 4/5/2022 on Eliquis\par last dose 6/8 AM history of DVTs on Eliquis, CVA in 2007 with no residual\par symptoms, hypertension, hyperlipidemia referred to the ED by his primary care\par physician for anemia. Patient here with his daughter. Patient has been feeling\par fatigued over the last10 days, started to have black stool approximately 2\par episodes a day for the last 3 to 4 days. Had outpatient labs on the first\par significant for hemoglobin of 7 which is new for the patient,\par baseline Hb 9.3 in 2022, 14 in 2020.  Status post EGD and colonoscopy at the hospital back and June upper endoscopy unremarkable no H. pylori.  2 subcentimeter nonbleeding AVMs or APC in the colon.  Last hemoglobin in the range between 8 and 9.  Denies any overt GI bleeding continues to be on Eliquis

## 2022-10-17 NOTE — ASSESSMENT
[FreeTextEntry1] : 73 yo M\par Anemia\par No overt Gi bleed\par VCE: non bleeding AVM in the proximal jejunum\par \par Plan\par Upper endsocopy/ deep anterograde enteroscopy possible single balloon off Eliquis x 2days\par Follow up with hematology\par Continue PO iron\par Plan discussed with his daughter at length

## 2022-11-09 ENCOUNTER — APPOINTMENT (OUTPATIENT)
Dept: CARDIOLOGY | Facility: CLINIC | Age: 72
End: 2022-11-09

## 2022-11-21 ENCOUNTER — APPOINTMENT (OUTPATIENT)
Dept: GASTROENTEROLOGY | Facility: CLINIC | Age: 72
End: 2022-11-21

## 2022-12-28 ENCOUNTER — NON-APPOINTMENT (OUTPATIENT)
Age: 72
End: 2022-12-28

## 2022-12-28 ENCOUNTER — APPOINTMENT (OUTPATIENT)
Dept: CARDIOLOGY | Facility: CLINIC | Age: 72
End: 2022-12-28
Payer: MEDICAID

## 2022-12-28 PROCEDURE — 93296 REM INTERROG EVL PM/IDS: CPT

## 2022-12-28 PROCEDURE — 93294 REM INTERROG EVL PM/LDLS PM: CPT

## 2023-03-22 ENCOUNTER — APPOINTMENT (OUTPATIENT)
Dept: ELECTROPHYSIOLOGY | Facility: CLINIC | Age: 73
End: 2023-03-22
Payer: MEDICAID

## 2023-03-22 VITALS
DIASTOLIC BLOOD PRESSURE: 70 MMHG | SYSTOLIC BLOOD PRESSURE: 112 MMHG | OXYGEN SATURATION: 96 % | HEART RATE: 67 BPM | HEIGHT: 66 IN

## 2023-03-22 PROCEDURE — 93280 PM DEVICE PROGR EVAL DUAL: CPT

## 2023-03-22 PROCEDURE — 93000 ELECTROCARDIOGRAM COMPLETE: CPT | Mod: 59

## 2023-03-22 PROCEDURE — 99214 OFFICE O/P EST MOD 30 MIN: CPT

## 2023-03-22 PROCEDURE — 99072 ADDL SUPL MATRL&STAF TM PHE: CPT

## 2023-03-26 NOTE — PROCEDURE
[No] : not [2nd Degree Block] : second degree block [Pacemaker] : pacemaker [DDDR] : DDDR [Longevity: ___ months] : The estimated remaining battery life is [unfilled] months [Threshold Testing Performed] : Threshold testing was performed [Lead Imp:  ___ohms] : lead impedance was [unfilled] ohms [Sensing Amplitude ___mv] : sensing amplitude was [unfilled] mv [___V @] : [unfilled] V [___ ms] : [unfilled] ms [Programmed for Longevity] : output reprogrammed for improved battery longevity [de-identified] : [employer] [de-identified] : KELLEY [de-identified] : [id number] [de-identified] : 4-5-22 [de-identified] :  [de-identified] : AP: 83.9%,  96.5%\par AT/AF burden 5.6%, pt takes Eloquis\par 10 AF episodes longest lasting >39 hours with avg. v-rate = 67-106bpm.\par 3 NSVT episodes longest lasting 11 beats with avg. v-rate = 160-200bpm. \par 1 NSVT episode on 01/23/23 lasting 24 beats with avg. v-rate = 182bpm. \par Transmitting on [facility]. \par

## 2023-03-26 NOTE — HISTORY OF PRESENT ILLNESS
[FreeTextEntry1] : I had a pleasure of seeing Mr. PITTMAN for consultation for high degree AV block. He is accompanied by his wife. Speaks Romansh. \par \par Mr. PITTMAN is a 71 year-year old male with history of CAD/CABG, CVA post CBAG, HTN, nSVT, paroxysmal atrial fibrillation is here for high degree AV block.\par \par + High degree AV block on monitor0 with symptoms\par + Fatigue\par \par 06/22/22: Feels fine. s/p PPM-feels great after that. Had severe anemia- needed blood transfusion. Had colono + EGD without any significant findings. No dark stool or bleeding anymore.\par \par 07/27/22: Feels fine. No more bleeding. To see GI for capsule endoscopy in 1st week of Aug. Last Hgb 9.3 (2021- in 12s/13s). \par He is accompanied by his daughter, who speaks English.\par \par 09/28/22: Feels fine. Undergoing GI work-up- awaiting results of capsule endoscopy\par 3/22/23: Feels fine. No further episodes of GIB. Hgb reported by patient in 12-13s. He is accompanied by his wife. \par \par Denies chest pain, shortness of breath, palpitation, dizziness or LOC except noted above.\par \par EKG (3/22/23): Sr-\par EKG (09/28/22): SR-\par EKG (07/27/22): SR-\par EKG (06/22/22): SR-@75\par EKG (3/23/22): SR@ 53, , QRSd 86\par TTE (03/22): Nl EF, Mod LAE\par Cardio: Dr. Quiles\par

## 2023-03-26 NOTE — ASSESSMENT
[FreeTextEntry1] : ## High degree AV block s/p DC-PPM (MDT, 22, Dep)\par ## Severe 1st degree AV block-symptomatic\par ## paroxysmal atrial fibrillation \par ## Severe Anemia\par \par - PPM interrogation shows normally functioning DC-PPM. Battery life ok. No new events. \par - Severe anemia s/p blood transfusion. No more bleeding. EGD: erosive gastritis, colonoscopy: Non-bleeding AVM in colon, internal hemorrhoids. Awaiting Capsule endoscopy results.\par - - On Eliquis. Compliant. No bleeding issues. Patient to contact us if there is any bleeding issues, interruption or any issues with OAC. Patient to go to ER/call 911 if any major bleeding. \par - GI/Hem-onc f-up\par - Hgb better with no recent known GI bleed. I recommend to continue anticoagulation for now.  Need for LAAC to be decided based upon GI work-up\par \par - Remote Monitoring\par - Return in 3-6 months\par

## 2023-06-28 ENCOUNTER — APPOINTMENT (OUTPATIENT)
Dept: CARDIOLOGY | Facility: CLINIC | Age: 73
End: 2023-06-28
Payer: MEDICAID

## 2023-06-28 ENCOUNTER — NON-APPOINTMENT (OUTPATIENT)
Age: 73
End: 2023-06-28

## 2023-06-28 PROCEDURE — 93294 REM INTERROG EVL PM/LDLS PM: CPT

## 2023-06-28 PROCEDURE — 93296 REM INTERROG EVL PM/IDS: CPT

## 2023-07-19 ENCOUNTER — APPOINTMENT (OUTPATIENT)
Dept: NEUROLOGY | Facility: CLINIC | Age: 73
End: 2023-07-19

## 2023-08-09 ENCOUNTER — APPOINTMENT (OUTPATIENT)
Dept: NEUROLOGY | Facility: CLINIC | Age: 73
End: 2023-08-09
Payer: MEDICAID

## 2023-08-09 VITALS
HEIGHT: 66 IN | WEIGHT: 195 LBS | BODY MASS INDEX: 31.34 KG/M2 | SYSTOLIC BLOOD PRESSURE: 112 MMHG | OXYGEN SATURATION: 99 % | DIASTOLIC BLOOD PRESSURE: 77 MMHG | HEART RATE: 80 BPM | TEMPERATURE: 98.6 F

## 2023-08-09 DIAGNOSIS — M54.12 RADICULOPATHY, CERVICAL REGION: ICD-10-CM

## 2023-08-09 PROCEDURE — 99214 OFFICE O/P EST MOD 30 MIN: CPT

## 2023-08-10 PROBLEM — M54.12 CERVICAL RADICULOPATHY: Status: ACTIVE | Noted: 2020-03-03

## 2023-08-10 NOTE — PHYSICAL EXAM
[FreeTextEntry1] : a+Ox3 naming and repetition normal No facial asymmetry EOMI Movements symmetric DTR present strength 5/5 b/l FTN nL.

## 2023-08-10 NOTE — END OF VISIT
[] : Resident [FreeTextEntry3] : Plan as above Patient having severe neck pain Also memory issues as per wife and granddaughter Applying for citizenship exam and appears to have difficulty studying due too his short term memory issues  [Time Spent: ___ minutes] : I have spent [unfilled] minutes of time on the encounter.

## 2023-08-10 NOTE — HISTORY OF PRESENT ILLNESS
[FreeTextEntry1] : Silverio is a 70yo man here for follow up of R MCA stroke in 2019 and cervical radiculopathy/stenosis.  He continues to get daily right neck pain and say he does some exercises at home.  He had previously been referred to PT and that seemed to help.  The R neck pain has been very bothersome to him lately particularly at night, feels tense and spasming, cannot turn head to R due to pain.

## 2023-08-10 NOTE — ASSESSMENT
[FreeTextEntry1] : Mr. Lomeli is a 70yo man with right MCA stroke July 2019 here for follow up for this and R neck pain which continues to bother him.   - started on Robaxin 500mg PRN qHS - Continue aspirin and atrovastatin - Exercise and diet - Continue celexa 10mg QD - Diclofenac gel applied to right neck PRN - Pain management referral

## 2023-09-27 ENCOUNTER — APPOINTMENT (OUTPATIENT)
Dept: CARDIOLOGY | Facility: CLINIC | Age: 73
End: 2023-09-27

## 2023-11-08 ENCOUNTER — APPOINTMENT (OUTPATIENT)
Dept: ELECTROPHYSIOLOGY | Facility: CLINIC | Age: 73
End: 2023-11-08
Payer: MEDICAID

## 2023-11-08 ENCOUNTER — APPOINTMENT (OUTPATIENT)
Dept: CARDIOLOGY | Facility: CLINIC | Age: 73
End: 2023-11-08
Payer: MEDICAID

## 2023-11-08 VITALS
HEART RATE: 65 BPM | BODY MASS INDEX: 30.22 KG/M2 | HEIGHT: 66 IN | WEIGHT: 188 LBS | DIASTOLIC BLOOD PRESSURE: 60 MMHG | SYSTOLIC BLOOD PRESSURE: 82 MMHG

## 2023-11-08 DIAGNOSIS — I44.39 OTHER ATRIOVENTRICULAR BLOCK: ICD-10-CM

## 2023-11-08 DIAGNOSIS — I10 ESSENTIAL (PRIMARY) HYPERTENSION: ICD-10-CM

## 2023-11-08 DIAGNOSIS — I48.0 PAROXYSMAL ATRIAL FIBRILLATION: ICD-10-CM

## 2023-11-08 PROCEDURE — 93280 PM DEVICE PROGR EVAL DUAL: CPT

## 2023-11-08 PROCEDURE — 99214 OFFICE O/P EST MOD 30 MIN: CPT

## 2023-11-08 PROCEDURE — 93000 ELECTROCARDIOGRAM COMPLETE: CPT | Mod: 59

## 2023-11-08 PROCEDURE — 93000 ELECTROCARDIOGRAM COMPLETE: CPT

## 2023-12-27 NOTE — PRE-ANESTHESIA EVALUATION ADULT - WEIGHT IN LBS
Continued Stay Note  [facility]     Patient Name: Anthony Gallegos  MRN: [id number]  Today's Date: 12/27/2023    Admit Date: 12/20/2023    Plan: Awaiting new choices for SNF.   Discharge Plan       Row Name 12/27/23 1811       Plan    Plan Awaiting new choices for SNF.    Patient/Family in Agreement with Plan yes    Plan Comments Spoke with patient and family at bedside. Introduced self. Patient requested that CCP call daughter. CCP talked to patient's daughter via outbound call. CCP explained that currently unable to find accepting facility. Explained that CCP had left SNF list at bedside with patient and family, requested multiple choices for SNF. Voiced understanding and agreed.                   Discharge Codes    No documentation.                 Expected Discharge Date and Time       Expected Discharge Date Expected Discharge Time    Dec 27, 2023               Mariposa Orozco RN     195.1

## 2024-02-03 ENCOUNTER — EMERGENCY (EMERGENCY)
Facility: HOSPITAL | Age: 74
LOS: 0 days | Discharge: HOME | End: 2024-02-03
Attending: EMERGENCY MEDICINE
Payer: MEDICAID

## 2024-02-03 VITALS
RESPIRATION RATE: 18 BRPM | WEIGHT: 176.37 LBS | SYSTOLIC BLOOD PRESSURE: 107 MMHG | DIASTOLIC BLOOD PRESSURE: 66 MMHG | TEMPERATURE: 97 F | HEART RATE: 91 BPM | OXYGEN SATURATION: 99 %

## 2024-02-03 DIAGNOSIS — S42.302A UNSPECIFIED FRACTURE OF SHAFT OF HUMERUS, LEFT ARM, INITIAL ENCOUNTER FOR CLOSED FRACTURE: ICD-10-CM

## 2024-02-03 DIAGNOSIS — S01.511A LACERATION WITHOUT FOREIGN BODY OF LIP, INITIAL ENCOUNTER: ICD-10-CM

## 2024-02-03 DIAGNOSIS — Z95.5 PRESENCE OF CORONARY ANGIOPLASTY IMPLANT AND GRAFT: ICD-10-CM

## 2024-02-03 DIAGNOSIS — S09.90XA UNSPECIFIED INJURY OF HEAD, INITIAL ENCOUNTER: ICD-10-CM
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
## 2024-02-03 DIAGNOSIS — I25.10 ATHEROSCLEROTIC HEART DISEASE OF NATIVE CORONARY ARTERY WITHOUT ANGINA PECTORIS: ICD-10-CM

## 2024-02-03 DIAGNOSIS — S80.212A ABRASION, LEFT KNEE, INITIAL ENCOUNTER: ICD-10-CM
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
## 2024-02-03 DIAGNOSIS — Z95.1 PRESENCE OF AORTOCORONARY BYPASS GRAFT: Chronic | ICD-10-CM

## 2024-02-03 DIAGNOSIS — Z98.890 OTHER SPECIFIED POSTPROCEDURAL STATES: Chronic | ICD-10-CM

## 2024-02-03 DIAGNOSIS — Z95.1 PRESENCE OF AORTOCORONARY BYPASS GRAFT: ICD-10-CM

## 2024-02-03 DIAGNOSIS — S80.211A ABRASION, RIGHT KNEE, INITIAL ENCOUNTER: ICD-10-CM

## 2024-02-03 DIAGNOSIS — Z86.73 PERSONAL HISTORY OF TRANSIENT ISCHEMIC ATTACK (TIA), AND CEREBRAL INFARCTION WITHOUT RESIDUAL DEFICITS: ICD-10-CM

## 2024-02-03 DIAGNOSIS — S01.21XA LACERATION WITHOUT FOREIGN BODY OF NOSE, INITIAL ENCOUNTER: ICD-10-CM

## 2024-02-03 DIAGNOSIS — Z86.718 PERSONAL HISTORY OF OTHER VENOUS THROMBOSIS AND EMBOLISM: ICD-10-CM
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
## 2024-02-03 DIAGNOSIS — Z79.01 LONG TERM (CURRENT) USE OF ANTICOAGULANTS: ICD-10-CM

## 2024-02-03 DIAGNOSIS — S00.81XA ABRASION OF OTHER PART OF HEAD, INITIAL ENCOUNTER: ICD-10-CM

## 2024-02-03 LAB
ALBUMIN SERPL ELPH-MCNC: 3.7 G/DL — SIGNIFICANT CHANGE UP (ref 3.5–5.2)
ALP SERPL-CCNC: 113 U/L — SIGNIFICANT CHANGE UP (ref 30–115)
ALT FLD-CCNC: 37 U/L — SIGNIFICANT CHANGE UP (ref 0–41)
ANION GAP SERPL CALC-SCNC: 12 MMOL/L — SIGNIFICANT CHANGE UP (ref 7–14)
APTT BLD: 30 SEC — SIGNIFICANT CHANGE UP (ref 27–39.2)
AST SERPL-CCNC: 30 U/L — SIGNIFICANT CHANGE UP (ref 0–41)
BASOPHILS # BLD AUTO: 0.05 K/UL — SIGNIFICANT CHANGE UP (ref 0–0.2)
BASOPHILS NFR BLD AUTO: 0.5 % — SIGNIFICANT CHANGE UP (ref 0–1)
BILIRUB SERPL-MCNC: 0.3 MG/DL — SIGNIFICANT CHANGE UP (ref 0.2–1.2)
BUN SERPL-MCNC: 29 MG/DL — HIGH (ref 10–20)
CALCIUM SERPL-MCNC: 9 MG/DL — SIGNIFICANT CHANGE UP (ref 8.4–10.4)
CHLORIDE SERPL-SCNC: 104 MMOL/L — SIGNIFICANT CHANGE UP (ref 98–110)
CO2 SERPL-SCNC: 26 MMOL/L — SIGNIFICANT CHANGE UP (ref 17–32)
CREAT SERPL-MCNC: 1.3 MG/DL — SIGNIFICANT CHANGE UP (ref 0.7–1.5)
EGFR: 57 ML/MIN/1.73M2 — LOW
EOSINOPHIL # BLD AUTO: 0.47 K/UL — SIGNIFICANT CHANGE UP (ref 0–0.7)
EOSINOPHIL NFR BLD AUTO: 4.8 % — SIGNIFICANT CHANGE UP (ref 0–8)
GLUCOSE SERPL-MCNC: 129 MG/DL — HIGH (ref 70–99)
HCT VFR BLD CALC: 38.7 % — LOW (ref 42–52)
HGB BLD-MCNC: 12.6 G/DL — LOW (ref 14–18)
IMM GRANULOCYTES NFR BLD AUTO: 0.4 % — HIGH (ref 0.1–0.3)
INR BLD: 1.29 RATIO — SIGNIFICANT CHANGE UP (ref 0.65–1.3)
LACTATE SERPL-SCNC: 2.7 MMOL/L — HIGH (ref 0.7–2)
LIDOCAIN IGE QN: 28 U/L — SIGNIFICANT CHANGE UP (ref 7–60)
LYMPHOCYTES # BLD AUTO: 3.99 K/UL — HIGH (ref 1.2–3.4)
LYMPHOCYTES # BLD AUTO: 40.9 % — SIGNIFICANT CHANGE UP (ref 20.5–51.1)
MCHC RBC-ENTMCNC: 28.2 PG — SIGNIFICANT CHANGE UP (ref 27–31)
MCHC RBC-ENTMCNC: 32.6 G/DL — SIGNIFICANT CHANGE UP (ref 32–37)
MCV RBC AUTO: 86.6 FL — SIGNIFICANT CHANGE UP (ref 80–94)
MONOCYTES # BLD AUTO: 1.29 K/UL — HIGH (ref 0.1–0.6)
MONOCYTES NFR BLD AUTO: 13.2 % — HIGH (ref 1.7–9.3)
NEUTROPHILS # BLD AUTO: 3.91 K/UL — SIGNIFICANT CHANGE UP (ref 1.4–6.5)
NEUTROPHILS NFR BLD AUTO: 40.2 % — LOW (ref 42.2–75.2)
NRBC # BLD: 0 /100 WBCS — SIGNIFICANT CHANGE UP (ref 0–0)
PLATELET # BLD AUTO: 277 K/UL — SIGNIFICANT CHANGE UP (ref 130–400)
PMV BLD: 11.1 FL — HIGH (ref 7.4–10.4)
POTASSIUM SERPL-MCNC: 4.2 MMOL/L — SIGNIFICANT CHANGE UP (ref 3.5–5)
POTASSIUM SERPL-SCNC: 4.2 MMOL/L — SIGNIFICANT CHANGE UP (ref 3.5–5)
PROT SERPL-MCNC: 6.7 G/DL — SIGNIFICANT CHANGE UP (ref 6–8)
PROTHROM AB SERPL-ACNC: 14.7 SEC — HIGH (ref 9.95–12.87)
RBC # BLD: 4.47 M/UL — LOW (ref 4.7–6.1)
RBC # FLD: 15.9 % — HIGH (ref 11.5–14.5)
SODIUM SERPL-SCNC: 142 MMOL/L — SIGNIFICANT CHANGE UP (ref 135–146)
WBC # BLD: 9.75 K/UL — SIGNIFICANT CHANGE UP (ref 4.8–10.8)
WBC # FLD AUTO: 9.75 K/UL — SIGNIFICANT CHANGE UP (ref 4.8–10.8)

## 2024-02-03 PROCEDURE — 70450 CT HEAD/BRAIN W/O DYE: CPT | Mod: 26,MA

## 2024-02-03 PROCEDURE — 73060 X-RAY EXAM OF HUMERUS: CPT | Mod: 26,LT

## 2024-02-03 PROCEDURE — 73030 X-RAY EXAM OF SHOULDER: CPT | Mod: 26,LT

## 2024-02-03 PROCEDURE — 72170 X-RAY EXAM OF PELVIS: CPT

## 2024-02-03 PROCEDURE — 73200 CT UPPER EXTREMITY W/O DYE: CPT | Mod: 26,LT,MA

## 2024-02-03 PROCEDURE — 74177 CT ABD & PELVIS W/CONTRAST: CPT | Mod: MA

## 2024-02-03 PROCEDURE — 70450 CT HEAD/BRAIN W/O DYE: CPT | Mod: MA

## 2024-02-03 PROCEDURE — 70486 CT MAXILLOFACIAL W/O DYE: CPT | Mod: 26,MA

## 2024-02-03 PROCEDURE — 96374 THER/PROPH/DIAG INJ IV PUSH: CPT | Mod: XU

## 2024-02-03 PROCEDURE — 85025 COMPLETE CBC W/AUTO DIFF WBC: CPT

## 2024-02-03 PROCEDURE — 96375 TX/PRO/DX INJ NEW DRUG ADDON: CPT | Mod: XU

## 2024-02-03 PROCEDURE — 72170 X-RAY EXAM OF PELVIS: CPT | Mod: 26

## 2024-02-03 PROCEDURE — 73200 CT UPPER EXTREMITY W/O DYE: CPT | Mod: MA,LT

## 2024-02-03 PROCEDURE — 85610 PROTHROMBIN TIME: CPT

## 2024-02-03 PROCEDURE — 29105 APPLICATION LONG ARM SPLINT: CPT | Mod: LT

## 2024-02-03 PROCEDURE — 73080 X-RAY EXAM OF ELBOW: CPT | Mod: 26,LT

## 2024-02-03 PROCEDURE — 73030 X-RAY EXAM OF SHOULDER: CPT | Mod: LT

## 2024-02-03 PROCEDURE — 71260 CT THORAX DX C+: CPT | Mod: 26,MA

## 2024-02-03 PROCEDURE — 99285 EMERGENCY DEPT VISIT HI MDM: CPT

## 2024-02-03 PROCEDURE — 99284 EMERGENCY DEPT VISIT MOD MDM: CPT

## 2024-02-03 PROCEDURE — 83690 ASSAY OF LIPASE: CPT

## 2024-02-03 PROCEDURE — 99284 EMERGENCY DEPT VISIT MOD MDM: CPT | Mod: 25

## 2024-02-03 PROCEDURE — 73060 X-RAY EXAM OF HUMERUS: CPT | Mod: 26,LT,77

## 2024-02-03 PROCEDURE — 73060 X-RAY EXAM OF HUMERUS: CPT | Mod: LT

## 2024-02-03 PROCEDURE — 71045 X-RAY EXAM CHEST 1 VIEW: CPT

## 2024-02-03 PROCEDURE — 85730 THROMBOPLASTIN TIME PARTIAL: CPT

## 2024-02-03 PROCEDURE — 72125 CT NECK SPINE W/O DYE: CPT | Mod: MA

## 2024-02-03 PROCEDURE — 74177 CT ABD & PELVIS W/CONTRAST: CPT | Mod: 26,MA

## 2024-02-03 PROCEDURE — 73562 X-RAY EXAM OF KNEE 3: CPT | Mod: 50

## 2024-02-03 PROCEDURE — 73080 X-RAY EXAM OF ELBOW: CPT | Mod: LT

## 2024-02-03 PROCEDURE — 80053 COMPREHEN METABOLIC PANEL: CPT

## 2024-02-03 PROCEDURE — 71045 X-RAY EXAM CHEST 1 VIEW: CPT | Mod: 26

## 2024-02-03 PROCEDURE — 82962 GLUCOSE BLOOD TEST: CPT

## 2024-02-03 PROCEDURE — 73562 X-RAY EXAM OF KNEE 3: CPT | Mod: 26,50

## 2024-02-03 PROCEDURE — 83605 ASSAY OF LACTIC ACID: CPT

## 2024-02-03 PROCEDURE — 71260 CT THORAX DX C+: CPT | Mod: MA

## 2024-02-03 PROCEDURE — 72125 CT NECK SPINE W/O DYE: CPT | Mod: 26,MA

## 2024-02-03 PROCEDURE — 36415 COLL VENOUS BLD VENIPUNCTURE: CPT

## 2024-02-03 PROCEDURE — 70486 CT MAXILLOFACIAL W/O DYE: CPT | Mod: MA

## 2024-02-03 RX ORDER — MIDAZOLAM HYDROCHLORIDE 1 MG/ML
2 INJECTION, SOLUTION INTRAMUSCULAR; INTRAVENOUS ONCE
Refills: 0 | Status: DISCONTINUED | OUTPATIENT
Start: 2024-02-03 | End: 2024-02-03

## 2024-02-03 RX ORDER — MORPHINE SULFATE 50 MG/1
4 CAPSULE, EXTENDED RELEASE ORAL ONCE
Refills: 0 | Status: DISCONTINUED | OUTPATIENT
Start: 2024-02-03 | End: 2024-02-03

## 2024-02-03 RX ADMIN — MIDAZOLAM HYDROCHLORIDE 2 MILLIGRAM(S): 1 INJECTION, SOLUTION INTRAMUSCULAR; INTRAVENOUS at 15:39

## 2024-02-03 RX ADMIN — MORPHINE SULFATE 4 MILLIGRAM(S): 50 CAPSULE, EXTENDED RELEASE ORAL at 15:31

## 2024-02-03 NOTE — CONSULT NOTE ADULT - SUBJECTIVE AND OBJECTIVE BOX
ORTHOPAEDIC SURGERY CONSULT NOTE    HPI: 73yMale past medical history of DVT on Eliquis, HLD, CAD with stents and CABG, CVA, presents to the emergency department status post fall.  Patient's daughter states he was running to catch the bus and missed stepped causing him to trip and fall.  He landed on his left arm. Reporting left arm pain as well as scattered superficial abrasions to faces, knees, hand. Denies. Patient denies LOC. Denies pain elsewhere. Denies paresthesias or weakness.     PAST MEDICAL & SURGICAL HISTORY:  CAD (coronary artery disease)  s/p stenting and CABG 10 years ago      HLD (hyperlipidemia)      Stroke  10 years ago as per MD (2006, 2009, 2019- HAS FORGETFULNESS AND MOOD CHANGES SINCE LAST STROKE)      DVT, lower extremity  January 2019      Afib      S/P CABG (coronary artery bypass graft)      History of hernia repair      History of surgery  THROMBECTOMY        Allergies: No Known Allergies    Medications:     PHYSICAL EXAM:  Vital Signs Last 24 Hrs  T(C): 36.1 (03 Feb 2024 12:41), Max: 36.1 (03 Feb 2024 12:41)  T(F): 97 (03 Feb 2024 12:41), Max: 97 (03 Feb 2024 12:41)  HR: 91 (03 Feb 2024 12:41) (91 - 91)  BP: 107/66 (03 Feb 2024 12:41) (107/66 - 107/66)  BP(mean): --  RR: 18 (03 Feb 2024 12:41) (18 - 18)  SpO2: 99% (03 Feb 2024 12:41) (99% - 99%)    Parameters below as of 03 Feb 2024 12:41  Patient On (Oxygen Delivery Method): room air        Physical Exam:  General: NAD. AAOx3.  Resp: NLB on RA.    RUE:  Ecchymosis of arm   Deformity of mid-arm  No open skin or wounds  TTP arm  NTTP elbow, forearm, wrist or hand  SILT in Ax/M/U/R distributions.  AIN/PIN/U motor intact.  2+ distal pulses with normal cap refill at distal finger tips.   Compartments soft and compressible.    LUE:  FROM painless  Scattered superficial abrasions   NTTP   AIN, PIN, UN  SILT distally     B/L LE  Scattered superficial abrasions   Full ROM painless  Able to SLR bilaterally   NTTP  EHL, FHL, TA, G/S intact   Foot wwp, bcr       Labs:                        12.6   9.75  )-----------( 277      ( 03 Feb 2024 13:17 )             38.7     02-03    142  |  104  |  29<H>  ----------------------------<  129<H>  4.2   |  26  |  1.3    Ca    9.0      03 Feb 2024 13:17    TPro  6.7  /  Alb  3.7  /  TBili  0.3  /  DBili  x   /  AST  30  /  ALT  37  /  AlkPhos  113  02-03    PT/INR - ( 03 Feb 2024 13:17 )   PT: 14.70 sec;   INR: 1.29 ratio         PTT - ( 03 Feb 2024 13:17 )  PTT:30.0 sec    Imaging XR: left displaced mid-shaft humerus fracture with varus angulation and large butterfly fragment     A/P: 73y Male with displaced left mid to upper shaft humerus fracture. Patient was placed into a coaptation splint. Radial nerve intact before and after splinting. Post splint x-rays demonstrate improved alignment. Discussed with family and patient possible need for surgery vs Christian bracing pending discussion with ortho traumatologist as outpatient.     - Pain control  - Activity: NWB LUE  - Keep splint C/D/I. Splint care explained.  - Extremity icing/elevation.  - Patient instructed to return to ED if any worsening pain, numbness, tingling, fevers, chills or any other concerning symptoms.  - F/U with Dr. Mullins in 1 week. Please call [phone].

## 2024-02-03 NOTE — ED PROVIDER NOTE - OBJECTIVE STATEMENT
73-year-old male, with past medical history of DVT on Eliquis, HLD, CAD with stents and CABG, CVA, presents to the emergency department status post fall with head trauma.  Patient's daughter states he was running to catch the bus and missed stepped causing him to trip and fall.  He landed on his left arm which is where the patient has pain.  Denies LOC, chest pain, shortness of breath, abdominal pain, headache.

## 2024-02-03 NOTE — ED PROVIDER NOTE - CARE PROVIDER_API CALL
Chele Mullins  Orthopaedic Surgery  [address] [place] [address]  Phone: [phone]  Fax: [phone]  Follow Up Time: 7-10 Days

## 2024-02-03 NOTE — CONSULT NOTE ADULT - ATTENDING COMMENTS
Patient seen in ER at 12:55 pm  73yM w/ PMHx of Coronary artery disease status post CABG in 2020 paroxysmal A-fib with intermittent heart block status post pacemaker placed in April 2022 DVT CVA in 2007 hypertension and hyperlipidemia seen as Trauma Alert S/P fall, +HT, -LOC, +AC.  The Patient reports that he was running when he had a mechanical slip and fall where he fell onto his left side.  The patient was able to ambulate on the scene afterwards.  The patient denies any nausea, vomiting, vision changes, headache, fever, chills. Trauma assessment in ED: ABCs intact , GCS 15 , AAOx3. External signs of trauma include: Left arm dislocation, splinting applied in ED  General: NAD  HEENT: Normocephalic, atraumatic, EOMI, PEERLA.   Neck: Soft, midline trachea. no c-spine tenderness  Chest: No chest wall tenderness, no subcutaneous emphysema   Cardiac: S1, S2, RRR  Respiratory: Bilateral breath sounds, clear and equal bilaterally  Abdomen: Soft, non-distended, non-tender, no rebound, no guarding.  Groin: Normal appearing, pelvis stable   Ext:  Moving b/l upper and lower extremities. Palpable Radial b/l UE, b/l DP palpable in LE. Left mid humeral deformity, placed in left sling. Abrasions to bilateral knees. Upper inner lip superficial laceration. Two small lacerations to nasal bridge  Back: No T/L/S spine tenderness, No palpable runoff/stepoff/deformity  Rectal: good tone     Injuries identified: pending      PLAN:     -Trauma Imaging: CXR, Pelvic Xray, CT Head,  CT C-spine, CT Max/Face, CT Chest, CT Abd/Pelvis, Extremity films (***)    - Additional consultations: Neurosurgery/Orthopedics/OMFS/ PT/Rehab/SW/Hospitalist/Medicine     - Trauma Labs to include: CBC, BMP, Coags, T&S, UA, EtOH level Patient seen in ER at 12:55 pm  73yM w/ PMHx of Coronary artery disease status post CABG in 2020 paroxysmal A-fib with intermittent heart block status post pacemaker placed in April 2022 DVT CVA in 2007 hypertension and hyperlipidemia seen as Trauma Alert S/P fall, +HT, -LOC, +AC.  The Patient reports that he was running when he had a mechanical slip and fall where he fell onto his left side.  The patient was able to ambulate on the scene afterwards.  The patient denies any nausea, vomiting, vision changes, headache, fever, chills. Trauma assessment in ED: ABCs intact , GCS 15 , AAOx3. External signs of trauma include: Left arm dislocation, splinting applied in ED  General: NAD  HEENT: Normocephalic, atraumatic, EOMI, PEERLA.   Neck: Soft, midline trachea. no c-spine tenderness  Chest: No chest wall tenderness, no subcutaneous emphysema   Cardiac: S1, S2, RRR  Respiratory: Bilateral breath sounds, clear and equal bilaterally  Abdomen: Soft, non-distended, non-tender, no rebound, no guarding.  Groin: Normal appearing, pelvis stable   Ext:  Moving b/l upper and lower extremities. Palpable Radial b/l UE, b/l DP palpable in LE. Left mid humeral deformity, placed in left sling. Abrasions to bilateral knees. Upper inner lip superficial laceration. Two small lacerations to nasal bridge  Back: No T/L/S spine tenderness, No palpable runoff/stepoff/deformity  Rectal: good tone     Injuries identified: Isolated acute comminuted displaced spiral fracture of left proximal humerus shaft. No other traumatic injuries identified      PLAN:     -No further traumatic workup needed at this time  -Patient may follow up on an outpatient basis as per Orthopedic Surgery Recommendations  -Patient has support at home  -Patient instructed to return if symptoms progress or worsen

## 2024-02-03 NOTE — ED ADULT TRIAGE NOTE - CHIEF COMPLAINT QUOTE
S/p mechanical fall. + head injury. C/o left arm pain. Pt takes eliquis. GCS 15. Trauma alert activated.

## 2024-02-03 NOTE — ED PROVIDER NOTE - NSFOLLOWUPINSTRUCTIONS_ED_ALL_ED_FT
Fall Prevention in the Home, Adult  Falls can cause injuries and can affect people from all age groups. There are many simple things that you can do to make your home safe and to help prevent falls. Ask for help when making these changes, if needed.    What actions can I take to prevent falls?  General instructions     Use good lighting in all rooms. Replace any light bulbs that burn out.  Turn on lights if it is dark. Use night-lights.  Place frequently used items in easy-to-reach places. Lower the shelves around your home if necessary.  Set up furniture so that there are clear paths around it. Avoid moving your furniture around.  Remove throw rugs and other tripping hazards from the floor.  Avoid walking on wet floors.  Fix any uneven floor surfaces.  Add color or contrast paint or tape to grab bars and handrails in your home. Place contrasting color strips on the first and last steps of stairways.  When you use a stepladder, make sure that it is completely opened and that the sides are firmly locked. Have someone hold the ladder while you are using it. Do not climb a closed stepladder.  Be aware of any and all pets.  What can I do in the bathroom?     Keep the floor dry. Immediately clean up any water that spills onto the floor.  Remove soap buildup in the tub or shower on a regular basis.  Use non-skid mats or decals on the floor of the tub or shower.  Attach bath mats securely with double-sided, non-slip rug tape.  If you need to sit down while you are in the shower, use a plastic, non-slip stool.  Image ImageInstall grab bars by the toilet and in the tub and shower. Do not use towel bars as grab bars.  What can I do in the bedroom?     Make sure that a bedside light is easy to reach.  Do not use oversized bedding that drapes onto the floor.  Have a firm chair that has side arms to use for getting dressed.  What can I do in the kitchen?     Clean up any spills right away.  If you need to reach for something above you, use a sturdy step stool that has a grab bar.  Keep electrical cables out of the way.  Do not use floor polish or wax that makes floors slippery. If you must use wax, make sure that it is non-skid floor wax.  What can I do in the stairways?     Do not leave any items on the stairs.  Make sure that you have a light switch at the top of the stairs and the bottom of the stairs. Have them installed if you do not have them.  Make sure that there are handrails on both sides of the stairs. Fix handrails that are broken or loose. Make sure that handrails are as long as the stairways.  Install non-slip stair treads on all stairs in your home.  Avoid having throw rugs at the top or bottom of stairways, or secure the rugs with carpet tape to prevent them from moving.  Choose a carpet design that does not hide the edge of steps on the stairway.  Check any carpeting to make sure that it is firmly attached to the stairs. Fix any carpet that is loose or worn.  What can I do on the outside of my home?     Use bright outdoor lighting.  Regularly repair the edges of walkways and driveways and fix any cracks.  Remove high doorway thresholds.  Trim any shrubbery on the main path into your home.  Regularly check that handrails are securely fastened and in good repair. Both sides of any steps should have handrails.  Install guardrails along the edges of any raised decks or porches.  Clear walkways of debris and clutter, including tools and rocks.  Have leaves, snow, and ice cleared regularly.  Use sand or salt on walkways during winter months.  In the garage, clean up any spills right away, including grease or oil spills.  What other actions can I take?     Wear closed-toe shoes that fit well and support your feet. Wear shoes that have rubber soles or low heels.  Use mobility aids as needed, such as canes, walkers, scooters, and crutches.  Review your medicines with your health care provider. Some medicines can cause dizziness or changes in blood pressure, which increase your risk of falling.  Talk with your health care provider about other ways that you can decrease your risk of falls. This may include working with a physical therapist or  to improve your strength, balance, and endurance.    Where to find more information  Centers for Disease Control and Prevention, [place]ARY KAY: https://www.cdc.gov  [employer] [place] on Aging: https://jf7pxqt.george.nih.gov  Contact a health care provider if:  You are afraid of falling at home.  You feel weak, drowsy, or dizzy at home.  You fall at home.  Summary  There are many simple things that you can do to make your home safe and to help prevent falls.  Ways to make your home safe include removing tripping hazards and installing grab bars in the bathroom.  Ask for help when making these changes in your home.  This information is not intended to replace advice given to you by your health care provider. Make sure you discuss any questions you have with your health care provider.

## 2024-02-03 NOTE — CONSULT NOTE ADULT - ASSESSMENT
ASSESSMENT:  73yM w/ PMHx of Coronary artery disease status post CABG in 2020 paroxysmal A-fib with intermittent heart block status post pacemaker placed in April 2022 DVT CVA in 2007 hypertension and hyperlipidemia seen as Trauma Alert S/P fall, +HT, -LOC, +AC.  The Patient reports that he was running when he had a mechanical slip and fall where he fell onto his left side.  The patient was able to ambulate on the scene afterwards.  The patient denies any nausea, vomiting, vision changes, headache, fever, chills.Trauma assessment in ED: ABCs intact , GCS 15 , AAOx3. External signs of trauma include: Left arm dislocation, splinting applied in ED     Injuries identified: pending    Incomplete Note     PLAN:     -Trauma Imaging: CXR, Pelvic Xray, CT Head,  CT C-spine, CT Max/Face, CT Chest, CT Abd/Pelvis, Extremity films (***)    - Additional consultations: Neurosurgery/Orthopedics/OMFS/ PT/Rehab/SW/Hospitalist/Medicine     - Trauma Labs to include: CBC, BMP, Coags, T&S, UA, EtOH level    - Additional items: ***    Disposition pending results of above labs and imaging (Home/Floor/Tele/SDU/SICU)  Above plan discussed with Trauma attending, Dr. Mtz , patient, patient family, and ED team  --------------------------------------------------------------------------------------     ASSESSMENT:  73yM w/ PMHx of Coronary artery disease status post CABG in 2020 paroxysmal A-fib with intermittent heart block status post pacemaker placed in April 2022 DVT CVA in 2007 hypertension and hyperlipidemia seen as Trauma Alert S/P fall, +HT, -LOC, +AC.  The Patient reports that he was running when he had a mechanical slip and fall where he fell onto his left side.  The patient was able to ambulate on the scene afterwards.  The patient denies any nausea, vomiting, vision changes, headache, fever, chills.Trauma assessment in ED: ABCs intact , GCS 15 , AAOx3. External signs of trauma include: Left arm dislocation, splinting applied in ED     Injuries identified: Isolated acute comminuted displaced spiral fracture of left proximal humerus shaft. No other traumatic injuries identified      PLAN:     -No further traumatic workup needed at this time  -Patient may follow up on an outpatient basis as per Orthopedic Surgery Recommendations  -Patient has support at home  -Patient instructed to return if symptoms progress or worsen  -Disposition per ED    Above plan discussed with Trauma attending, Dr. Mtz , patient, patient family, and ED team  --------------------------------------------------------------------------------------

## 2024-02-03 NOTE — ED PROVIDER NOTE - PATIENT PORTAL LINK FT
You can access the FollowMyHealth Patient Portal offered by [facility] by registering at the following website: [email or web address]. By joining [employer]’s FollowMyHealth portal, you will also be able to view your health information using other applications (apps) compatible with our system.

## 2024-02-03 NOTE — ED PROVIDER NOTE - CLINICAL SUMMARY MEDICAL DECISION MAKING FREE TEXT BOX
Patient presented status post fall as documented, currently on Eliquis with head trauma.  At this time, complaining of isolated left arm pain.  Otherwise on arrival, patient afebrile, hemodynamically stable, fully neurovascularly intact, however called as trauma alert given that patient is on anticoagulation with head trauma.  In conjunction with trauma team, obtained pan scan as well as x-rays of the left upper extremity which revealed a left proximal humeral shaft fracture, but no other acute traumatic injury.  Obtained labs which were grossly unremarkable including no significant leukocytosis, anemia, signs of dehydration/MAURICIO, transaminitis or significant electrolyte abnormalities.  Consulted orthopedics who evaluated the patient in the ED and placed cast.  From their standpoint, recommending outpatient follow-up, no further intervention.  Patient also cleared from trauma standpoint as well.  Patient able to ambulate, tolerates p.o.  Family feels comfortable taking patient home. Given the above, will discharge home with outpatient follow up. Patient agreeable with plan. Agrees to return to ED for any new or worsening symptoms.

## 2024-02-03 NOTE — ED ADULT NURSE NOTE - NSFALLHARMRISKINTERV_ED_ALL_ED
Assistance OOB with selected safe patient handling equipment if applicable/Assistance with ambulation/Communicate risk of Fall with Harm to all staff, patient, and family/Monitor gait and stability/Provide visual cue: red socks, yellow wristband, yellow gown, etc/Reinforce activity limits and safety measures with patient and family/Bed in lowest position, wheels locked, appropriate side rails in place/Call bell, personal items and telephone in reach/Instruct patient to call for assistance before getting out of bed/chair/stretcher/Non-slip footwear applied when patient is off stretcher/[place] to call system/Physically safe environment - no spills, clutter or unnecessary equipment/Purposeful Proactive Rounding/Room/bathroom lighting operational, light cord in reach Communicate risk of Fall with Harm to all staff, patient, and family/Provide visual cue: red socks, yellow wristband, yellow gown, etc/Reinforce activity limits and safety measures with patient and family/Bed in lowest position, wheels locked, appropriate side rails in place/Call bell, personal items and telephone in reach/Instruct patient to call for assistance before getting out of bed/chair/stretcher/Non-slip footwear applied when patient is off stretcher/[place] to call system/Physically safe environment - no spills, clutter or unnecessary equipment/Purposeful Proactive Rounding/Room/bathroom lighting operational, light cord in reach

## 2024-02-03 NOTE — ED PROVIDER NOTE - PHYSICAL EXAMINATION
CONSTITUTIONAL: WDWN. NAD. Speaking in full sentences, holding LUE still at side   SKIN: abrasions to bilateral knees   HEAD: normocephalic, two lacerations to nasal bridge   EYES: PERRLA, EOMI  ENT: upper inner lip superficial laceration, airway clear   NECK: No posterior midline cervical tenderness  CARD: +S1, S2 no murmurs, gallops, or rubs. Regular rate and rhythm. Radial, DP, PT 2+/4 bilaterally  RESP: LCTAB. No wheezes, rales or rhonchi.  ABD: Abdomen soft, nontender, nondistended.  NEURO: Alert, oriented, grossly unremarkable. Follows commands. sensation intact.   MSK: obvious closed L mid humeral deformity. No chest wall tenderness or crepitus  BACK: No posterior midline tenderness  PSYCH: Cooperative, appropriate.

## 2024-02-03 NOTE — CONSULT NOTE ADULT - SUBJECTIVE AND OBJECTIVE BOX
TRAUMA ACTIVATION LEVEL:  CODE / ALERT  / CONSULT  ACTIVATED BY: EMS**  /  ED**  INTUBATED: YES** / NO**      MECHANISM OF INJURY:   [] Blunt     [] MVC	  [] Fall	  [] Pedestrian Struck	  [] Motorcycle     [] Assault     [] Bicycle collision    [] Sports injury    [] Penetrating    [] Gun Shot Wound      [] Stab Wound    GCS: 15 	E: 4	V: 5	M: 6    HPI:    73yM w/ PMHx of Coronary artery disease status post CABG in 2020 paroxysmal A-fib with intermittent heart block status post pacemaker placed in April 2022 DVT CVA in 2007 hypertension and hyperlipidemia seen as Trauma Alert S/P fall, +HT, -LOC, +AC.  The Patient reports that he was running when he had a mechanical slip and fall where he fell onto his left side.  The patient was able to ambulate on the scene afterwards.  The patient denies any nausea, vomiting, vision changes, headache, fever, chills.Trauma assessment in ED: ABCs intact , GCS 15 , AAOx3. External signs of trauma include: Left arm dislocation, splinting applied in ED    PAST MEDICAL & SURGICAL HISTORY:  CAD (coronary artery disease)  s/p stenting and CABG 10 years ago      HLD (hyperlipidemia)      Stroke  10 years ago as per MD (2006, 2009, 2019- HAS FORGETFULNESS AND MOOD CHANGES SINCE LAST STROKE)      DVT, lower extremity  January 2019      Afib      S/P CABG (coronary artery bypass graft)      History of hernia repair      History of surgery  THROMBECTOMY          Allergies    No Known Allergies    Intolerances        Home Medications:  apixaban 5 mg oral tablet: 1 tab(s) orally every 12 hours (07 Sep 2022 07:22)  atorvastatin 20 mg oral tablet: 1 tab(s) orally once a day (at bedtime) (07 Sep 2022 07:22)  enalapril 10 mg oral tablet: 1 tab(s) orally once a day (07 Sep 2022 07:22)  metoprolol succinate 50 mg oral tablet, extended release: 1 tab(s) orally once a day (07 Sep 2022 07:22)      ROS: 10-system review is otherwise negative except HPI above.      Primary Survey:    A - airway intact  B - bilateral breath sounds and good chest rise  C - palpable pulses in all extremities  D - GCS 15 on arrival, SEGURA  Exposure obtained    Vital Signs Last 24 Hrs  T(C): 36.1 (03 Feb 2024 12:41), Max: 36.1 (03 Feb 2024 12:41)  T(F): 97 (03 Feb 2024 12:41), Max: 97 (03 Feb 2024 12:41)  HR: 91 (03 Feb 2024 12:41) (91 - 91)  BP: 107/66 (03 Feb 2024 12:41) (107/66 - 107/66)  BP(mean): --  RR: 18 (03 Feb 2024 12:41) (18 - 18)  SpO2: 99% (03 Feb 2024 12:41) (99% - 99%)    Parameters below as of 03 Feb 2024 12:41  Patient On (Oxygen Delivery Method): room air        Secondary Survey:   General: NAD  HEENT: Normocephalic, atraumatic, EOMI, PEERLA.   Neck: Soft, midline trachea. no c-spine tenderness  Chest: No chest wall tenderness, no subcutaneous emphysema   Cardiac: S1, S2, RRR  Respiratory: Bilateral breath sounds, clear and equal bilaterally  Abdomen: Soft, non-distended, non-tender, no rebound, no guarding.  Groin: Normal appearing, pelvis stable   Ext:  Moving b/l upper and lower extremities. Palpable Radial b/l UE, b/l DP palpable in LE. Left mid humeral deformity, placed in left sling. Abrasions to bilateral knees. Upper inner lip superficial laceration. Two small lacerations to nasal bridge  Back: No T/L/S spine tenderness, No palpable runoff/stepoff/deformity  Rectal: good tone    Labs:  CAPILLARY BLOOD GLUCOSE      POCT Blood Glucose.: 114 mg/dL (03 Feb 2024 12:51)                          12.6   9.75  )-----------( 277      ( 03 Feb 2024 13:17 )             38.7       Auto Neutrophil %: 40.2 % (02-03-24 @ 13:17)  Auto Immature Granulocyte %: 0.4 % (02-03-24 @ 13:17)            RADIOLOGY & ADDITIONAL STUDIES:  ---------------------------------------------------------------------------------------  Pending    TRAUMA ACTIVATION LEVEL:  CODE / ALERT  / CONSULT  ACTIVATED BY: EMS**  /  ED**  INTUBATED: YES** / NO**      MECHANISM OF INJURY:   [] Blunt     [] MVC	  [] Fall	  [] Pedestrian Struck	  [] Motorcycle     [] Assault     [] Bicycle collision    [] Sports injury    [] Penetrating    [] Gun Shot Wound      [] Stab Wound    GCS: 15 	E: 4	V: 5	M: 6    HPI:    73yM w/ PMHx of Coronary artery disease status post CABG in 2020 paroxysmal A-fib with intermittent heart block status post pacemaker placed in April 2022 DVT CVA in 2007 hypertension and hyperlipidemia seen as Trauma Alert S/P fall, +HT, -LOC, +AC.  The Patient reports that he was running when he had a mechanical slip and fall where he fell onto his left side.  The patient was able to ambulate on the scene afterwards.  The patient denies any nausea, vomiting, vision changes, headache, fever, chills.Trauma assessment in ED: ABCs intact , GCS 15 , AAOx3. External signs of trauma include: Left arm dislocation, splinting applied in ED    PAST MEDICAL & SURGICAL HISTORY:  CAD (coronary artery disease)  s/p stenting and CABG 10 years ago      HLD (hyperlipidemia)      Stroke  10 years ago as per MD (2006, 2009, 2019- HAS FORGETFULNESS AND MOOD CHANGES SINCE LAST STROKE)      DVT, lower extremity  January 2019      Afib      S/P CABG (coronary artery bypass graft)      History of hernia repair      History of surgery  THROMBECTOMY          Allergies    No Known Allergies    Intolerances        Home Medications:  apixaban 5 mg oral tablet: 1 tab(s) orally every 12 hours (07 Sep 2022 07:22)  atorvastatin 20 mg oral tablet: 1 tab(s) orally once a day (at bedtime) (07 Sep 2022 07:22)  enalapril 10 mg oral tablet: 1 tab(s) orally once a day (07 Sep 2022 07:22)  metoprolol succinate 50 mg oral tablet, extended release: 1 tab(s) orally once a day (07 Sep 2022 07:22)      ROS: 10-system review is otherwise negative except HPI above.      Primary Survey:    A - airway intact  B - bilateral breath sounds and good chest rise  C - palpable pulses in all extremities  D - GCS 15 on arrival, SEGURA  Exposure obtained    Vital Signs Last 24 Hrs  T(C): 36.1 (03 Feb 2024 12:41), Max: 36.1 (03 Feb 2024 12:41)  T(F): 97 (03 Feb 2024 12:41), Max: 97 (03 Feb 2024 12:41)  HR: 91 (03 Feb 2024 12:41) (91 - 91)  BP: 107/66 (03 Feb 2024 12:41) (107/66 - 107/66)  BP(mean): --  RR: 18 (03 Feb 2024 12:41) (18 - 18)  SpO2: 99% (03 Feb 2024 12:41) (99% - 99%)    Parameters below as of 03 Feb 2024 12:41  Patient On (Oxygen Delivery Method): room air        Secondary Survey:   General: NAD  HEENT: Normocephalic, atraumatic, EOMI, PEERLA.   Neck: Soft, midline trachea. no c-spine tenderness  Chest: No chest wall tenderness, no subcutaneous emphysema   Cardiac: S1, S2, RRR  Respiratory: Bilateral breath sounds, clear and equal bilaterally  Abdomen: Soft, non-distended, non-tender, no rebound, no guarding.  Groin: Normal appearing, pelvis stable   Ext:  Moving b/l upper and lower extremities. Palpable Radial b/l UE, b/l DP palpable in LE. Left mid humeral deformity, placed in left sling. Abrasions to bilateral knees. Upper inner lip superficial laceration. Two small lacerations to nasal bridge  Back: No T/L/S spine tenderness, No palpable runoff/stepoff/deformity  Rectal: good tone    Labs:  CAPILLARY BLOOD GLUCOSE      POCT Blood Glucose.: 114 mg/dL (03 Feb 2024 12:51)                          12.6   9.75  )-----------( 277      ( 03 Feb 2024 13:17 )             38.7       Auto Neutrophil %: 40.2 % (02-03-24 @ 13:17)  Auto Immature Granulocyte %: 0.4 % (02-03-24 @ 13:17)            RADIOLOGY & ADDITIONAL STUDIES:  ---------------------------------------------------------------------------------------  < from: Xray Shoulder 2 Views, Left (02.03.24 @ 14:40) >  IMPRESSION:  1.  Acute comminuted left humerus proximal shaft fracture with bony   overlap and displacement.    < end of copied text >    < from: CT Cervical Spine No Cont (02.03.24 @ 13:57) >    CT HEAD:  No acute intracranial pathology or hemorrhage. No acute calvarial   fracture.    MAXILLOFACIAL BONES:  No evidence of maxillofacial bony fracture. No evidence of soft tissue   injury.    CT CERVICAL SPINE:  No acute fracture or subluxation.    < end of copied text >  < from: CT Chest w/ IV Cont (02.03.24 @ 14:05) >    1.  Acute comminuted and displaced spiral fracture of the left proximal   to humerus shaft.  2.  Otherwise, no CT evidence of additional acute traumatic intrathoracic   or abdominopelvic pathology.  3.  Moderate size hiatal hernia.

## 2024-02-09 ENCOUNTER — RX RENEWAL (OUTPATIENT)
Age: 74
End: 2024-02-09

## 2024-02-12 ENCOUNTER — APPOINTMENT (OUTPATIENT)
Dept: CARDIOLOGY | Facility: CLINIC | Age: 74
End: 2024-02-12

## 2024-02-16 ENCOUNTER — NON-APPOINTMENT (OUTPATIENT)
Age: 74
End: 2024-02-16

## 2024-02-16 ENCOUNTER — APPOINTMENT (OUTPATIENT)
Dept: CARDIOLOGY | Facility: CLINIC | Age: 74
End: 2024-02-16
Payer: MEDICAID

## 2024-02-16 PROCEDURE — 93294 REM INTERROG EVL PM/LDLS PM: CPT

## 2024-02-16 PROCEDURE — 93296 REM INTERROG EVL PM/IDS: CPT | Mod: NC

## 2024-02-29 ENCOUNTER — RX RENEWAL (OUTPATIENT)
Age: 74
End: 2024-02-29

## 2024-04-24 ENCOUNTER — APPOINTMENT (OUTPATIENT)
Dept: CARDIOLOGY | Facility: CLINIC | Age: 74
End: 2024-04-24
Payer: MEDICAID

## 2024-04-24 PROCEDURE — 93306 TTE W/DOPPLER COMPLETE: CPT

## 2024-04-25 ENCOUNTER — APPOINTMENT (OUTPATIENT)
Dept: NEUROLOGY | Facility: CLINIC | Age: 74
End: 2024-04-25
Payer: MEDICAID

## 2024-04-25 VITALS — BODY MASS INDEX: 30.51 KG/M2 | WEIGHT: 189 LBS

## 2024-04-25 VITALS
DIASTOLIC BLOOD PRESSURE: 80 MMHG | BODY MASS INDEX: 30.22 KG/M2 | HEART RATE: 80 BPM | WEIGHT: 188 LBS | HEIGHT: 66 IN | SYSTOLIC BLOOD PRESSURE: 130 MMHG

## 2024-04-25 DIAGNOSIS — I63.9 CEREBRAL INFARCTION, UNSPECIFIED: ICD-10-CM

## 2024-04-25 PROCEDURE — 99214 OFFICE O/P EST MOD 30 MIN: CPT

## 2024-04-25 PROCEDURE — G2211 COMPLEX E/M VISIT ADD ON: CPT | Mod: NC,1L

## 2024-04-25 RX ORDER — METHOCARBAMOL 500 MG/1
500 TABLET, FILM COATED ORAL 4 TIMES DAILY
Qty: 240 | Refills: 0 | Status: COMPLETED | COMMUNITY
Start: 2023-08-09 | End: 2024-04-25

## 2024-04-26 NOTE — ASSESSMENT
[FreeTextEntry1] : Mr. Dinh is a 70yo man with right MCA stroke July 2019 currently stable no new complains.  - Continue Eliquis and atorvastatin - Exercise and diet -Keep -130 -RTC in 1 year

## 2024-04-26 NOTE — HISTORY OF PRESENT ILLNESS
[FreeTextEntry1] : 72 yo man here for follow up of R MCA stroke in 2019 and cervical radiculopathy/stenosis. He reports improvement of right neck pain. No new complains. He was found to have paroxysmal A fib and was started on Eliquis. ASA was discontinued. No weakness, numbness, dysarthria, or aphasia. He tripped 3 months ago and broke his L arm, currently doing PT twice/ week.

## 2024-04-26 NOTE — PHYSICAL EXAM
[FreeTextEntry1] : GEN: NAD, pleasant, cooperative Richa speaking, refusing , Niece at the bedside helping with translation  AOX3 naming and repetition normal No facial asymmetry EOMI Movements symmetric, no drift, limited exam on the LUE secondary to recent fracture DTR present strength 5/5 b/l FTN nL.

## 2024-04-29 DIAGNOSIS — I50.22 CHRONIC SYSTOLIC (CONGESTIVE) HEART FAILURE: ICD-10-CM

## 2024-04-29 DIAGNOSIS — I25.10 ATHEROSCLEROTIC HEART DISEASE OF NATIVE CORONARY ARTERY W/OUT ANGINA PECTORIS: ICD-10-CM

## 2024-05-08 ENCOUNTER — APPOINTMENT (OUTPATIENT)
Dept: CARDIOLOGY | Facility: CLINIC | Age: 74
End: 2024-05-08

## 2024-05-09 ENCOUNTER — APPOINTMENT (OUTPATIENT)
Dept: ELECTROPHYSIOLOGY | Facility: CLINIC | Age: 74
End: 2024-05-09
Payer: MEDICAID

## 2024-05-09 VITALS
SYSTOLIC BLOOD PRESSURE: 118 MMHG | HEIGHT: 66 IN | WEIGHT: 193 LBS | DIASTOLIC BLOOD PRESSURE: 74 MMHG | BODY MASS INDEX: 31.02 KG/M2 | RESPIRATION RATE: 17 BRPM | TEMPERATURE: 97.7 F | HEART RATE: 71 BPM

## 2024-05-09 PROCEDURE — G2211 COMPLEX E/M VISIT ADD ON: CPT | Mod: NC,1L

## 2024-05-09 PROCEDURE — 99214 OFFICE O/P EST MOD 30 MIN: CPT

## 2024-05-09 PROCEDURE — 93280 PM DEVICE PROGR EVAL DUAL: CPT

## 2024-05-09 PROCEDURE — 93000 ELECTROCARDIOGRAM COMPLETE: CPT | Mod: 59

## 2024-05-09 RX ORDER — PANTOPRAZOLE 40 MG/1
40 TABLET, DELAYED RELEASE ORAL
Qty: 30 | Refills: 0 | Status: COMPLETED | COMMUNITY
Start: 2022-06-11 | End: 2024-05-09

## 2024-05-10 ENCOUNTER — INPATIENT (INPATIENT)
Facility: HOSPITAL | Age: 74
LOS: 3 days | Discharge: HOME | DRG: 198 | End: 2024-05-14
Attending: INTERNAL MEDICINE | Admitting: INTERNAL MEDICINE
Payer: MEDICAID

## 2024-05-10 VITALS
WEIGHT: 192.9 LBS | SYSTOLIC BLOOD PRESSURE: 142 MMHG | DIASTOLIC BLOOD PRESSURE: 75 MMHG | OXYGEN SATURATION: 99 % | RESPIRATION RATE: 14 BRPM | TEMPERATURE: 98 F | HEIGHT: 70 IN | HEART RATE: 92 BPM

## 2024-05-10 DIAGNOSIS — Z98.890 OTHER SPECIFIED POSTPROCEDURAL STATES: Chronic | ICD-10-CM

## 2024-05-10 DIAGNOSIS — Z95.1 PRESENCE OF AORTOCORONARY BYPASS GRAFT: Chronic | ICD-10-CM

## 2024-05-10 DIAGNOSIS — I25.10 ATHEROSCLEROTIC HEART DISEASE OF NATIVE CORONARY ARTERY WITHOUT ANGINA PECTORIS: ICD-10-CM

## 2024-05-10 DIAGNOSIS — Z95.0 PRESENCE OF CARDIAC PACEMAKER: Chronic | ICD-10-CM

## 2024-05-10 LAB
ANION GAP SERPL CALC-SCNC: 8 MMOL/L — SIGNIFICANT CHANGE UP (ref 7–14)
ANION GAP SERPL CALC-SCNC: 9 MMOL/L — SIGNIFICANT CHANGE UP (ref 7–14)
BUN SERPL-MCNC: 28 MG/DL — HIGH (ref 10–20)
BUN SERPL-MCNC: 29 MG/DL — HIGH (ref 10–20)
CALCIUM SERPL-MCNC: 8.7 MG/DL — SIGNIFICANT CHANGE UP (ref 8.4–10.5)
CALCIUM SERPL-MCNC: 9 MG/DL — SIGNIFICANT CHANGE UP (ref 8.4–10.5)
CHLORIDE SERPL-SCNC: 107 MMOL/L — SIGNIFICANT CHANGE UP (ref 98–110)
CHLORIDE SERPL-SCNC: 111 MMOL/L — HIGH (ref 98–110)
CO2 SERPL-SCNC: 24 MMOL/L — SIGNIFICANT CHANGE UP (ref 17–32)
CO2 SERPL-SCNC: 25 MMOL/L — SIGNIFICANT CHANGE UP (ref 17–32)
CREAT SERPL-MCNC: 0.9 MG/DL — SIGNIFICANT CHANGE UP (ref 0.7–1.5)
CREAT SERPL-MCNC: 1 MG/DL — SIGNIFICANT CHANGE UP (ref 0.7–1.5)
EGFR: 78 ML/MIN/1.73M2 — SIGNIFICANT CHANGE UP
EGFR: 89 ML/MIN/1.73M2 — SIGNIFICANT CHANGE UP
GLUCOSE SERPL-MCNC: 110 MG/DL — HIGH (ref 70–99)
GLUCOSE SERPL-MCNC: 118 MG/DL — HIGH (ref 70–99)
HCT VFR BLD CALC: 27.1 % — LOW (ref 42–52)
HCT VFR BLD CALC: 29.4 % — LOW (ref 42–52)
HGB BLD-MCNC: 8 G/DL — LOW (ref 14–18)
HGB BLD-MCNC: 8.5 G/DL — LOW (ref 14–18)
MCHC RBC-ENTMCNC: 22.4 PG — LOW (ref 27–31)
MCHC RBC-ENTMCNC: 22.6 PG — LOW (ref 27–31)
MCHC RBC-ENTMCNC: 28.9 G/DL — LOW (ref 32–37)
MCHC RBC-ENTMCNC: 29.5 G/DL — LOW (ref 32–37)
MCV RBC AUTO: 76.6 FL — LOW (ref 80–94)
MCV RBC AUTO: 77.4 FL — LOW (ref 80–94)
NRBC # BLD: 0 /100 WBCS — SIGNIFICANT CHANGE UP (ref 0–0)
NRBC # BLD: 0 /100 WBCS — SIGNIFICANT CHANGE UP (ref 0–0)
PLATELET # BLD AUTO: 230 K/UL — SIGNIFICANT CHANGE UP (ref 130–400)
PLATELET # BLD AUTO: 273 K/UL — SIGNIFICANT CHANGE UP (ref 130–400)
PMV BLD: 11.5 FL — HIGH (ref 7.4–10.4)
PMV BLD: 11.6 FL — HIGH (ref 7.4–10.4)
POTASSIUM SERPL-MCNC: 4.1 MMOL/L — SIGNIFICANT CHANGE UP (ref 3.5–5)
POTASSIUM SERPL-MCNC: 4.3 MMOL/L — SIGNIFICANT CHANGE UP (ref 3.5–5)
POTASSIUM SERPL-SCNC: 4.1 MMOL/L — SIGNIFICANT CHANGE UP (ref 3.5–5)
POTASSIUM SERPL-SCNC: 4.3 MMOL/L — SIGNIFICANT CHANGE UP (ref 3.5–5)
RBC # BLD: 3.54 M/UL — LOW (ref 4.7–6.1)
RBC # BLD: 3.8 M/UL — LOW (ref 4.7–6.1)
RBC # FLD: 15.9 % — HIGH (ref 11.5–14.5)
RBC # FLD: 15.9 % — HIGH (ref 11.5–14.5)
SODIUM SERPL-SCNC: 140 MMOL/L — SIGNIFICANT CHANGE UP (ref 135–146)
SODIUM SERPL-SCNC: 144 MMOL/L — SIGNIFICANT CHANGE UP (ref 135–146)
WBC # BLD: 4.65 K/UL — LOW (ref 4.8–10.8)
WBC # BLD: 5.32 K/UL — SIGNIFICANT CHANGE UP (ref 4.8–10.8)
WBC # FLD AUTO: 4.65 K/UL — LOW (ref 4.8–10.8)
WBC # FLD AUTO: 5.32 K/UL — SIGNIFICANT CHANGE UP (ref 4.8–10.8)

## 2024-05-10 PROCEDURE — 93459 L HRT ART/GRFT ANGIO: CPT

## 2024-05-10 PROCEDURE — 86850 RBC ANTIBODY SCREEN: CPT

## 2024-05-10 PROCEDURE — 36415 COLL VENOUS BLD VENIPUNCTURE: CPT

## 2024-05-10 PROCEDURE — 93005 ELECTROCARDIOGRAM TRACING: CPT

## 2024-05-10 PROCEDURE — 83540 ASSAY OF IRON: CPT

## 2024-05-10 PROCEDURE — 86900 BLOOD TYPING SEROLOGIC ABO: CPT

## 2024-05-10 PROCEDURE — 80053 COMPREHEN METABOLIC PANEL: CPT

## 2024-05-10 PROCEDURE — 93459 L HRT ART/GRFT ANGIO: CPT | Mod: 26

## 2024-05-10 PROCEDURE — C1894: CPT

## 2024-05-10 PROCEDURE — 82728 ASSAY OF FERRITIN: CPT

## 2024-05-10 PROCEDURE — 83550 IRON BINDING TEST: CPT

## 2024-05-10 PROCEDURE — 99223 1ST HOSP IP/OBS HIGH 75: CPT

## 2024-05-10 PROCEDURE — C1887: CPT

## 2024-05-10 PROCEDURE — 85025 COMPLETE CBC W/AUTO DIFF WBC: CPT

## 2024-05-10 PROCEDURE — 86901 BLOOD TYPING SEROLOGIC RH(D): CPT

## 2024-05-10 PROCEDURE — 84466 ASSAY OF TRANSFERRIN: CPT

## 2024-05-10 PROCEDURE — 83735 ASSAY OF MAGNESIUM: CPT

## 2024-05-10 PROCEDURE — C1769: CPT

## 2024-05-10 RX ORDER — CHLORHEXIDINE GLUCONATE 213 G/1000ML
1 SOLUTION TOPICAL ONCE
Refills: 0 | Status: DISCONTINUED | OUTPATIENT
Start: 2024-05-10 | End: 2024-05-14

## 2024-05-10 RX ORDER — FAMOTIDINE 10 MG/ML
40 INJECTION INTRAVENOUS DAILY
Refills: 0 | Status: DISCONTINUED | OUTPATIENT
Start: 2024-05-10 | End: 2024-05-13

## 2024-05-10 RX ORDER — APIXABAN 2.5 MG/1
1 TABLET, FILM COATED ORAL
Refills: 0 | DISCHARGE

## 2024-05-10 RX ORDER — SODIUM CHLORIDE 9 MG/ML
1000 INJECTION INTRAMUSCULAR; INTRAVENOUS; SUBCUTANEOUS
Refills: 0 | Status: DISCONTINUED | OUTPATIENT
Start: 2024-05-10 | End: 2024-05-10

## 2024-05-10 RX ORDER — METOPROLOL TARTRATE 50 MG
50 TABLET ORAL DAILY
Refills: 0 | Status: DISCONTINUED | OUTPATIENT
Start: 2024-05-10 | End: 2024-05-14

## 2024-05-10 RX ORDER — ATORVASTATIN CALCIUM 80 MG/1
20 TABLET, FILM COATED ORAL AT BEDTIME
Refills: 0 | Status: DISCONTINUED | OUTPATIENT
Start: 2024-05-10 | End: 2024-05-14

## 2024-05-10 RX ADMIN — SODIUM CHLORIDE 100 MILLILITER(S): 9 INJECTION INTRAMUSCULAR; INTRAVENOUS; SUBCUTANEOUS at 12:54

## 2024-05-10 RX ADMIN — ATORVASTATIN CALCIUM 20 MILLIGRAM(S): 80 TABLET, FILM COATED ORAL at 21:25

## 2024-05-10 NOTE — H&P CARDIOLOGY - COMMENTS
73-year-old M with PMHx of HTN, HLD, CAD s/p CABG in 2010 in [facility], paroxysmal A.fib (on Eliquis - last dose on 5/7/24), high degree AV block s/p PPM placed 4/5/2022, h/o DVTs, CVA in 2007 and 2019 Right MCA infarct with residual left sided weakness, h/o GIB in 06/2022 requiring blood transfusion at the time with EGD and colonoscopy done at the time showing non-bleeding AVMs and large internal hemorrhoids, who presented to his cardiologist with c/o weakness and fatigue, with newly decreased EF of 37%, and runs of NSVT upon checking his device. In light of pt's risk factors, known CAD, newly depressed EF, and runs of NSVT, he is now referred for LHC with possible intervention if clinically indicated. 73-year-old M with PMHx of HTN, HLD, CAD s/p CABG in 2010 in [facility], paroxysmal A.fib (on Eliquis - last dose on 5/7/24), high degree AV block s/p PPM placed 4/5/2022, h/o DVTs, CVA in 2007 and 2019 Right MCA infarct with residual left sided weakness, h/o GIB in 06/2022 requiring blood transfusion at the time with EGD and colonoscopy done at the time showing non-bleeding AVMs and large internal hemorrhoids, who presented to his cardiologist with c/o weakness and fatigue, with newly decreased EF of 37%, and runs of NSVT upon checking his device.  Pt is cancelled from cath due to Hb of 8, to be admitted to [address] [place] [address] for blood transfusion and GI work-up.

## 2024-05-10 NOTE — CONSULT NOTE ADULT - ASSESSMENT
Assessment and Plan  Case of a 73 year old male patient known to have a history of HTN, DL, CAD, CVA, paroxysmal afib on eliquis, history of DVT, AV block s/p PPM, HFrEF,  and history of colon/jejunal AVMs who was referred to the hospital for cardiac cath in setting of reduction in EF, NSVT, and CAD. We are consulted for concern of acute on chronic anemia.      Acute on Chronic Microcytic Anemia   History of Non Bleeding AVMs in Colon at Hepatic Flexure s/p APC and Proximal Jejunum  No Evidence of Overt Gastrointestinal Bleeding  * History of GI bleeding (melena) in 06/2022  * EGD 06/10/2022 large HH, non erosive gastritis (bx no HP IM), normal D (bx -)  * Colonoscopy 06/10/2022 good prep, 2 small non bleeding AVMs along hepatic flexure; s/p APC; internal hemorrhoids  * VCE 09/07/2022: small non bleeding AVM along proximal jejunum  * Baseline Hb 12.9 in 2019 -> 7.5-9 in 2022 -> 11.9/12.4 in 02/2024 -> Hb 8.2 on 04/24/2024  * ED Labs Hb 8.5/8 on 05/10; no iron studies  * History: no weight loss, no overt blood loss per rectum or os; no GI complaints; no recent NSAID use  * Family History: negative for GI malignancies    RECOMMENDATIONS  - May benefit from EGD and colonoscopy for workup of anemia. Will need cardiology risk stratification  - Trend Hb and keep active T/S. Transfuse as needed to keep Hb >7     - Can continue IV PPI 40mg BID for now until UGIB is ruled out with EGD  - Continue FeSO4 325mg QD  - Advance diet as tolerated if Hb stable and there are no signs of active bleeding  - Monitor BM for signs of bleeding (for melena or hematochezia)  - Follow up with our [facility] located at [address] [place] [address]. Phone Number: [phone]    - Follow up with our [facility] Hepatology [facility] located at [address] [place] [address], [facility]. Telephone No: [phone]      Thank you for your consult.  - Please note that plan was communicated with medical team.   - Please reach GI on [phone] during weekdays till 5pm.  - Please call the GI service line after 5pm on Weekdays and anytime on Weekends: [phone].      Evan Abarca MD  PGY - 4 Gastroenterology Fellow   [facility]     Assessment and Plan  Case of a 73 year old male patient known to have a history of HTN, DL, CAD, CVA, paroxysmal afib on eliquis, history of DVT, AV block s/p PPM, HFrEF,  and history of colon/jejunal AVMs who was referred to the hospital for cardiac cath in setting of reduction in EF, NSVT, and CAD. We are consulted for concern of acute on chronic anemia.      Acute on Chronic Microcytic Anemia   History of Non Bleeding AVMs in Colon at Hepatic Flexure s/p APC and Proximal Jejunum  No Evidence of Overt Gastrointestinal Bleeding  * History of GI bleeding (melena) in 06/2022  * EGD 06/10/2022 large HH, non erosive gastritis (bx no HP IM), normal D (bx -)  * Colonoscopy 06/10/2022 good prep, 2 small non bleeding AVMs along hepatic flexure; s/p APC; internal hemorrhoids  * VCE 09/07/2022: small non bleeding AVM along proximal jejunum  * Baseline Hb 12.9 in 2019 -> 7.5-9 in 2022 -> 11.9/12.4 in 02/2024 -> Hb 8.2 on 04/24/2024  * Last eliquis dose was on 05/07  * ED Labs Hb 8.5/8 on 05/10; no iron studies  * History: no weight loss, no overt blood loss per rectum or os; no GI complaints; no recent NSAID use  * Family History: negative for GI malignancies    RECOMMENDATIONS  - May benefit from EGD and colonoscopy for workup of anemia (will tentatively schedule on 05/13). Will need cardiology risk stratification  - Trend Hb and keep active T/S. Transfuse as needed to keep Hb >7. Check iron studies  - In the setting of stable Hb since 04/2024 and absence of overt GI bleeding, can resume necessary AC from GI standpoint if benefits outweigh risks (last eliquis dose was on 05/07)  - Switch famotidine to protonix 40mg QD  - Advance diet as tolerated if Hb stable and there are no signs of active bleeding  - Monitor BM. Recommend bowel regimen to avoid constipation  - Avoid non essential NSAIDs  - Follow up with our [facility] located at [address] [place] [address]. Phone Number: [phone]        Thank you for your consult.  - Please note that plan was communicated with medical team.   - Please reach GI on [phone] during weekdays till 5pm.  - Please call the GI service line after 5pm on Weekdays and anytime on Weekends: [phone].      Evan Abarca MD  PGY - 4 Gastroenterology Fellow   [facility]     Assessment and Plan  Case of a 73 year old male patient known to have a history of HTN, DL, CAD, CVA, paroxysmal afib on eliquis, history of DVT, AV block s/p PPM, HFrEF,  and history of colon/jejunal AVMs who was referred to the hospital for cardiac cath in setting of reduction in EF, NSVT, and CAD. We are consulted for concern of acute on chronic anemia.      Acute on Chronic Microcytic Anemia   History of Non Bleeding AVMs in Colon at Hepatic Flexure s/p APC and Proximal Jejunum  No Evidence of Overt Gastrointestinal Bleeding  * History of GI bleeding (melena) in 06/2022  * EGD 06/10/2022 large HH, non erosive gastritis (bx no HP IM), normal D (bx -)  * Colonoscopy 06/10/2022 good prep, 2 small non bleeding AVMs along hepatic flexure; s/p APC; internal hemorrhoids  * VCE 09/07/2022: small non bleeding AVM along proximal jejunum  * Baseline Hb 12.9 in 2019 -> 7.5-9 in 2022 -> 11.9/12.4 in 02/2024 -> Hb 8.2 on 04/24/2024  * Last eliquis dose was on 05/07  * ED Labs Hb 8.5/8 on 05/10; no iron studies  * History: no weight loss, no overt blood loss per rectum or os; no GI complaints; no recent NSAID use  * Family History: negative for GI malignancies    RECOMMENDATIONS  - May benefit from non urgent EGD and colonoscopy for workup of anemia   - Will need cardiology risk stratification. If patient is high risk, then would recommend cardiac cath prior to scope as patient is not bleeding and risks would outweigh benefits  - Trend Hb and keep active T/S. Transfuse as needed to keep Hb >7. Check iron studies  - In the setting of stable Hb since 04/2024 and absence of overt GI bleeding, recommend short acting AC from GI standpoint if benefits outweigh risks (last eliquis dose was on 05/07)  - Switch famotidine to protonix 40mg QD  - Advance diet as tolerated if Hb stable and there are no signs of active bleeding  - Monitor BM. Recommend bowel regimen to avoid constipation  - Avoid non essential NSAIDs  - Follow up with our [facility] located at [address] [place] [address]. Phone Number: [phone]        Thank you for your consult.  - Please note that plan was communicated with medical team.   - Please reach GI on [phone] during weekdays till 5pm.  - Please call the GI service line after 5pm on Weekdays and anytime on Weekends: [phone].      Evan Abarca MD  PGY - 4 Gastroenterology Fellow   [facility]

## 2024-05-10 NOTE — H&P CARDIOLOGY - BSA (M2)
- Keep your incision clean and dry. No bathing, swimming, or submerging in water until incision is well healed.  Call clinic or go to Emergency Room if you develop any new pain, drainage, swelling, or fever.    - No lifting greater than 10 pounds. Limited bending, twisting, overhead reaching.    -Will discuss with Dr. Downing: physical therapy order for arm strengthening. We can fax this to [facility].     - Return to clinic as scheduled.     -Please call clinic with any further questions or concerns: [phone]    Magalie Lind RN  [facility]surgery  [address] [address]    Tel [phone]   1.94

## 2024-05-10 NOTE — CONSULT NOTE ADULT - SUBJECTIVE AND OBJECTIVE BOX
Gastroenterology Initial Consult Note      Location: [facility] [address] ([facility])  Patient Name: ARELI PITTMAN  Age: 73y  Gender: Male      Chief Complaint  Patient is a 73y old Male who presents with a chief complaint of   Primary diagnosis of Atherosclerosis of native coronary artery without angina pectoris      Reason for Consult  Acute on Chronic Microcytic Anemia   History of Non Bleeding AVMs in Colon at Hepatic Flexure s/p APC and Proximal Jejunum      History of Present Illness  73 year old male patient known to have a history of HTN, DL, CAD, CVA, paroxysmal afib on eliquis, history of DVT, AV block s/p PPM, HFrEF,  and history of colon/jejunal AVMs who was referred to the hospital for cardiac cath in setting of reduction in EF, NSVT, and CAD. We are consulted for concern of acute on chronic anemia.    Summary:  * History of GI bleeding (melena) in 06/2022  * EGD 06/10/2022 large HH, non erosive gastritis (bx no HP IM), normal D (bx -)  * Colonoscopy 06/10/2022 good prep, 2 small non bleeding AVMs along hepatic flexure; s/p APC; internal hemorrhoids  * VCE 09/07/2022: small non bleeding AVM along proximal jejunum  * Baseline Hb 12.9 in 2019 -> 7.5-9 in 2022 -> 11.9/12.4 in 02/2024 -> Hb 8.2 on 04/24/2024  * Last eliquis dose was on 05/07  * ED Labs Hb 8.5/8 on 05/10; no iron studies  * History: no weight loss, no overt blood loss per rectum or os; no GI complaints; no recent NSAID use  * Family History: negative for GI malignancies      Prior EGD:  as below      Prior Colonoscopy:  as below      Past Medical and Surgical History:  CAD (coronary artery disease)  s/p stenting and CABG 10 years ago  HLD (hyperlipidemia)  Stroke  10 years ago as per MD (2006, 2009, 2019- HAS FORGETFULNESS AND MOOD CHANGES SINCE LAST STROKE)  DVT, lower extremity  January 2019  Afib  HTN (hypertension)  S/P CABG (coronary artery bypass graft)  History of hernia repair  History of surgery  THROMBECTOMY  Pacemaker      Home Medications:  Home Medications:  atorvastatin 20 mg oral tablet: 1 tab(s) orally once a day (at bedtime) (10 May 2024 13:14)  Eliquis 2.5 mg oral tablet: 1 tab(s) orally 2 times a day (10 May 2024 14:05)  enalapril 10 mg oral tablet: 1 tab(s) orally once a day (10 May 2024 13:14)  Ferrex 150 Plus oral capsule: 1 cap(s) orally once a day (10 May 2024 14:05)  metoprolol succinate 50 mg oral tablet, extended release: 1 tab(s) orally once a day (10 May 2024 13:14)  Pepcid 40 mg oral tablet: 1 tab(s) orally once a day (10 May 2024 14:05)      Social History:  Tobacco: denies  Alcohol: denies  Drugs: denies      Allergies:  No Known Allergies      Family History:  FAMILY HISTORY:  FH: heart disease (Mother)      Vital Signs in the last 24 hours   Vitals Summary T(C): 36.8 (05-10-24 @ 12:21), Max: 36.8 (05-10-24 @ 12:21)  HR: 75 (05-10-24 @ 12:55) (75 - 92)  BP: 142/75 (05-10-24 @ 12:55) (142/75 - 142/75)  RR: 16 (05-10-24 @ 12:55) (14 - 16)  SpO2: 100% (05-10-24 @ 12:55) (99% - 100%)  Vent Data   Intake/ Output   Measurements Height (cm): 167.6 (05-10-24 @ 12:55)  Weight (kg): 85.3 (05-10-24 @ 12:55)  BMI (kg/m2): 30.4 (05-10-24 @ 12:55)  BSA (m2): 1.95 (05-10-24 @ 12:55)      Physical Exam  * General Appearance: Alert, cooperative, interactive, oriented to time, place, and person, in no acute distress  * Lungs: Good bilateral air entry, normal breath sounds (Clear to auscultation bilaterally  * Heart: Regular Rate and Rhythm, normal S1 and S2  * Abdomen: Symmetric, non-distended, no scar, soft, non-tender, bowel sounds active all four quadrants  * Rectal: Brown stool, Normal tone, no palpable masses or tenderness      Investigations   Laboratory Workup      - CBC:                        8.0    4.65  )-----------( 230      ( 10 May 2024 13:01 )             27.1       - Hgb Trend:  8.0  05-10-24 @ 13:01  8.5  05-10-24 @ 13:00        - Chemistry:  05-10    144  |  111<H>  |  28<H>  ----------------------------<  110<H>  4.1   |  25  |  1.0    Ca    8.7      10 May 2024 13:01  Liver panel trend:      Microbiological Workup  Urinalysis Basic - ( 10 May 2024 13:01 )    Color: x / Appearance: x / SG: x / pH: x  Gluc: 110 mg/dL / Ketone: x  / Bili: x / Urobili: x   Blood: x / Protein: x / Nitrite: x   Leuk Esterase: x / RBC: x / WBC x   Sq Epi: x / Non Sq Epi: x / Bacteria: x      Current Medications  Standing Medications  atorvastatin 20 milliGRAM(s) Oral at bedtime  chlorhexidine 4% Liquid 1 Application(s) Topical once  enalapril 10 milliGRAM(s) Oral daily  famotidine    Tablet 40 milliGRAM(s) Oral daily  metoprolol succinate ER 50 milliGRAM(s) Oral daily    PRN Medications    Singles Doses Administered  (ADM OVERRIDE) 1 each &lt;see task&gt; GiveOnce  (ADM OVERRIDE) 4 each &lt;see task&gt; GiveOnce  (ADM OVERRIDE) 1 each &lt;see task&gt; GiveOnce

## 2024-05-10 NOTE — H&P CARDIOLOGY - NSICDXPASTSURGICALHX_GEN_ALL_CORE_FT
PAST SURGICAL HISTORY:  History of hernia repair     History of surgery THROMBECTOMY    Pacemaker     S/P CABG (coronary artery bypass graft)

## 2024-05-10 NOTE — CHART NOTE - NSCHARTNOTEFT_GEN_A_CORE
Patient was scheduled for elective cardiac catherization due to NSVTs, but was found to have Hgb of 8, on AC. Given previous hx of GI bleed, cath aborted. Patient admitted to medicine for further evaluation and workup of anemia prior to cardiac cath. Accepting physician Dr. Rodriguez.

## 2024-05-10 NOTE — ASU PATIENT PROFILE, ADULT - FALL HARM RISK - HARM RISK INTERVENTIONS

## 2024-05-10 NOTE — H&P CARDIOLOGY - ATTENDING COMMENTS
Patient seen and examined independently. Resident's H & P reviewed. Agree with the findings and plan of care except,    73-year-old M with PMH of HTN, HLD, CAD s/p 2V CABG in 2010 in [facility] (LIMA-LAD, SVG-RPDA), with Dx cath in 09/2020 with patent graft sites, paroxysmal A.fib (on Eliquis - last dose on 5/7/24), high degree AV block s/p PPM 4/5/2022, h/o DVTs, CVAs in 2007 and 2019 Right MCA infarct with residual left sided weakness, h/o GIB in 06/2022 requiring blood transfusion, with EGD and colonoscopy done at the time showing non-bleeding AVMs and large internal hemorrhoids who presented to his cardiologist with c/o weakness and increased fatigue. Pt denies any rectal bleeding or melena.    TTE 4/24/24 revealed EF of 37%, mild MR.  Pt was also found to have runs of NSVT upon checking his device. Pt was admitted for LHC and w/u for anemia.     Microcytic Anemia  CAD s/p CABG  HTN / DL  PAF on Eliquis  High degree AV block s/p PPM  H/O CVA  H/O Chronic DVT  NSVT                 PLAN:    Tele reviewed. Episode of NSVT  EKG on admission:   Old record reviewed. Hb on 2/3/24 was 12.6. On admission it is 8.5  GI eval noted. As per GI H/H is stable recently. No active bleeding. No further inpatient w/u. Cardiac risk stratification if inpatient w/u needed  Monitor H/H  Iron studies. Check retic count.   Cardiology eval    Progress Note Handoff    Pending (specify):  Consults_Cardiology________, Tests________, Test Results_______, Other_________  Family discussion:  Disposition: Home___/SNF___/Other________/Unknown at this time________    Isac Brumfield MD  Spectra: [phone]

## 2024-05-10 NOTE — H&P CARDIOLOGY - HISTORY OF PRESENT ILLNESS
73-year-old M with PMHx of HTN, HLD, CAD s/p 2V CABG in 2010 in [facility] (LIMA-LAD, SVG-RPDA), with Dx cath in 09/2020 with patent graft sites, paroxysmal A.fib (on Eliquis - last dose on 5/7/24), high degree AV block s/p PPM 4/5/2022, h/o DVTs, CVAs in 2007 and 2019 Right MCA infarct with residual left sided weakness, h/o GIB in 06/2022 requiring blood transfusion, with EGD and colonoscopy done at the time showing non-bleeding AVMs and large internal hemorhoids, who presented to his cardiologist with c/o weakness and increased fatigue.  Denies any chest pain, SOB, dizziness, n/v, diaphoresis, orthopnea, PND, LE edema, palpitations, syncope.  TTE 4/24/24 revealed EF of 37%, mild MR.  Pt was also found to have runs of NSVT upon checking his device.   In light of pt's risk factors, known CAD, newly depressed EF, and runs of NSVT, he is now referred for C with possible intervention if clinically indicated.   ****Off Note: Outpt labs noted to have Hb of 8.2 (4/24/24), repeat CBC drawn and pending, and then will decide if to proceed with cath at this time.    Cath 9/2020:  Left main: Mild disease  LAD: Moderate disease in mid-segment 70 % diffuse lesion. Mid and distal vessel supplied by a patent LIMA graft  Diag: Luminal irregularities   Left Circumflex: luminal irregularities, mid - mild disease  OM: luminal irregularities  Right Coronary Artery: 60% mid-segment stenosis  RPDA: 100% at the ostium. Supplied by a patent SVG    Grafts:  LIMA to LAD - patent  SVG to RPDA - patent      Pre cath note:    indication:  [ ] STEMI                [ ] NSTEMI                 [ ] Acute coronary syndrome                     [ ]Unstable Angina   [ ] high risk  [ ] intermediate risk  [ ] low risk                     [ ] Stable Angina     non-invasive testing:  TTE       Date:      4/24/24          result: [ ] high risk  [x ] intermediate risk  [ ] low risk    Anti- Anginal medications:                    [ ] not used                       [x ] used                   [ ] not used but strong indication not to use  -on BB  -Amlodipine 2.5mg po x 1    Ejection Fraction                   [ ] <29            [x ] 30-39%   [ ] 40-49%     [ ]>50%    CHF                   [ ] active (within last 14 days on meds   [x ] Chronic (on meds but no exacerbation)    COPD                   [ ] mild (on chronic bronchodilators)  [ ] moderate (on chronic steroid therapy)      [ ] severe (indication for home O2 or PACO2 >50)    Other risk factors:                       [ ] Previous MI                     [x] CVA/ stroke                    [ ] carotid stent/ CEA                    [ ] PVD/PAD- (arterial aneurysm, non-palpable pulses, tortuous vessel with inability to insert catheter, infra-renal dissection, renal or subclavian artery stenosis)                    [ ] diabetic                    [x ] previous CABG                    [ ] Renal Failure      Right Pb Test: positive    Adjusted Cath Bleeding Risk: 4.6%    Pre-hydration: NS @ 100/hr x 2 hrs

## 2024-05-10 NOTE — H&P CARDIOLOGY - NSICDXPASTMEDICALHX_GEN_ALL_CORE_FT
PAST MEDICAL HISTORY:  Afib     CAD (coronary artery disease) s/p stenting and CABG 10 years ago    DVT, lower extremity January 2019    HLD (hyperlipidemia)     HTN (hypertension)     Stroke 10 years ago as per MD (2006, 2009, 2019- HAS FORGETFULNESS AND MOOD CHANGES SINCE LAST STROKE)

## 2024-05-11 LAB
ALBUMIN SERPL ELPH-MCNC: 3.5 G/DL — SIGNIFICANT CHANGE UP (ref 3.5–5.2)
ALP SERPL-CCNC: 161 U/L — HIGH (ref 30–115)
ALT FLD-CCNC: 69 U/L — HIGH (ref 0–41)
ANION GAP SERPL CALC-SCNC: 8 MMOL/L — SIGNIFICANT CHANGE UP (ref 7–14)
AST SERPL-CCNC: 73 U/L — HIGH (ref 0–41)
BASOPHILS # BLD AUTO: 0.03 K/UL — SIGNIFICANT CHANGE UP (ref 0–0.2)
BASOPHILS NFR BLD AUTO: 0.6 % — SIGNIFICANT CHANGE UP (ref 0–1)
BILIRUB SERPL-MCNC: 0.4 MG/DL — SIGNIFICANT CHANGE UP (ref 0.2–1.2)
BLD GP AB SCN SERPL QL: SIGNIFICANT CHANGE UP
BUN SERPL-MCNC: 26 MG/DL — HIGH (ref 10–20)
CALCIUM SERPL-MCNC: 8.7 MG/DL — SIGNIFICANT CHANGE UP (ref 8.4–10.5)
CHLORIDE SERPL-SCNC: 108 MMOL/L — SIGNIFICANT CHANGE UP (ref 98–110)
CO2 SERPL-SCNC: 25 MMOL/L — SIGNIFICANT CHANGE UP (ref 17–32)
CREAT SERPL-MCNC: 0.9 MG/DL — SIGNIFICANT CHANGE UP (ref 0.7–1.5)
EGFR: 89 ML/MIN/1.73M2 — SIGNIFICANT CHANGE UP
EOSINOPHIL # BLD AUTO: 0.36 K/UL — SIGNIFICANT CHANGE UP (ref 0–0.7)
EOSINOPHIL NFR BLD AUTO: 7.5 % — SIGNIFICANT CHANGE UP (ref 0–8)
FERRITIN SERPL-MCNC: 15 NG/ML — LOW (ref 30–400)
GLUCOSE SERPL-MCNC: 105 MG/DL — HIGH (ref 70–99)
HCT VFR BLD CALC: 28.1 % — LOW (ref 42–52)
HGB BLD-MCNC: 8 G/DL — LOW (ref 14–18)
IMM GRANULOCYTES NFR BLD AUTO: 0.2 % — SIGNIFICANT CHANGE UP (ref 0.1–0.3)
IRON SATN MFR SERPL: 17 UG/DL — LOW (ref 35–150)
IRON SATN MFR SERPL: 21 UG/DL — LOW (ref 35–150)
IRON SATN MFR SERPL: 4 % — LOW (ref 15–50)
IRON SATN MFR SERPL: 5 % — LOW (ref 15–50)
LYMPHOCYTES # BLD AUTO: 1.63 K/UL — SIGNIFICANT CHANGE UP (ref 1.2–3.4)
LYMPHOCYTES # BLD AUTO: 34 % — SIGNIFICANT CHANGE UP (ref 20.5–51.1)
MAGNESIUM SERPL-MCNC: 1.8 MG/DL — SIGNIFICANT CHANGE UP (ref 1.8–2.4)
MCHC RBC-ENTMCNC: 22.3 PG — LOW (ref 27–31)
MCHC RBC-ENTMCNC: 28.5 G/DL — LOW (ref 32–37)
MCV RBC AUTO: 78.3 FL — LOW (ref 80–94)
MONOCYTES # BLD AUTO: 0.77 K/UL — HIGH (ref 0.1–0.6)
MONOCYTES NFR BLD AUTO: 16.1 % — HIGH (ref 1.7–9.3)
NEUTROPHILS # BLD AUTO: 1.99 K/UL — SIGNIFICANT CHANGE UP (ref 1.4–6.5)
NEUTROPHILS NFR BLD AUTO: 41.6 % — LOW (ref 42.2–75.2)
NRBC # BLD: 0 /100 WBCS — SIGNIFICANT CHANGE UP (ref 0–0)
PLATELET # BLD AUTO: 230 K/UL — SIGNIFICANT CHANGE UP (ref 130–400)
PMV BLD: 11.7 FL — HIGH (ref 7.4–10.4)
POTASSIUM SERPL-MCNC: 4.2 MMOL/L — SIGNIFICANT CHANGE UP (ref 3.5–5)
POTASSIUM SERPL-SCNC: 4.2 MMOL/L — SIGNIFICANT CHANGE UP (ref 3.5–5)
PROT SERPL-MCNC: 6.4 G/DL — SIGNIFICANT CHANGE UP (ref 6–8)
RBC # BLD: 3.59 M/UL — LOW (ref 4.7–6.1)
RBC # FLD: 15.9 % — HIGH (ref 11.5–14.5)
SODIUM SERPL-SCNC: 141 MMOL/L — SIGNIFICANT CHANGE UP (ref 135–146)
TIBC SERPL-MCNC: 391 UG/DL — SIGNIFICANT CHANGE UP (ref 220–430)
TIBC SERPL-MCNC: 433 UG/DL — HIGH (ref 220–430)
TRANSFERRIN SERPL-MCNC: 345 MG/DL — SIGNIFICANT CHANGE UP (ref 200–360)
UIBC SERPL-MCNC: 370 UG/DL — SIGNIFICANT CHANGE UP (ref 110–370)
UIBC SERPL-MCNC: 416 UG/DL — HIGH (ref 110–370)
WBC # BLD: 4.79 K/UL — LOW (ref 4.8–10.8)
WBC # FLD AUTO: 4.79 K/UL — LOW (ref 4.8–10.8)

## 2024-05-11 PROCEDURE — 99223 1ST HOSP IP/OBS HIGH 75: CPT

## 2024-05-11 RX ORDER — ENOXAPARIN SODIUM 100 MG/ML
80 INJECTION SUBCUTANEOUS EVERY 12 HOURS
Refills: 0 | Status: COMPLETED | OUTPATIENT
Start: 2024-05-11 | End: 2024-05-11

## 2024-05-11 RX ORDER — ASPIRIN/CALCIUM CARB/MAGNESIUM 324 MG
81 TABLET ORAL DAILY
Refills: 0 | Status: DISCONTINUED | OUTPATIENT
Start: 2024-05-12 | End: 2024-05-14

## 2024-05-11 RX ADMIN — ENOXAPARIN SODIUM 80 MILLIGRAM(S): 100 INJECTION SUBCUTANEOUS at 18:11

## 2024-05-11 RX ADMIN — ATORVASTATIN CALCIUM 20 MILLIGRAM(S): 80 TABLET, FILM COATED ORAL at 21:20

## 2024-05-11 RX ADMIN — Medication 50 MILLIGRAM(S): at 05:29

## 2024-05-11 RX ADMIN — FAMOTIDINE 40 MILLIGRAM(S): 10 INJECTION INTRAVENOUS at 11:31

## 2024-05-11 RX ADMIN — Medication 10 MILLIGRAM(S): at 05:29

## 2024-05-11 NOTE — PROGRESS NOTE ADULT - ASSESSMENT
Patient is a 73-year-old male with history of CAD status post CABG in 2010, paroxysmal A. 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 hb 7.3 on outpt labs, baseline 9.3  -BP 94/54 on admission. non tachycardiac however on BB  -never had EGD/Colonoscopy  -GI consulted: Plan for EGD/Colono 6/9  -Protonix gtt  -2 large bore IV  -Trend CBC  -keep active type and screen  -Holding   eliquis (last dose 6/8 AM)  -s/p 1 u of prbc in ED  -clears diet, IVF with LR at 100cc/hr  EGD and colonoscopy today   Follow GI recommendations       #Afib   -chadsvasc 4  -hold eliquis  -c/w metoprolol    #DVT on eliquis  -hold eliquis  -repeat VA duplex 6/8: NG > start SCD    #CKDIII- stable  -monitor renal function    #HLD  -c/w statin    #HTN  -c/w metoprolol      - DVT Prophylaxis: none  - GI Prophylaxis: protonix  - Diet: clear  - Activity: as tolerated  - Code Status: Full Code   Patient is a 73-year-old male with history of CAD status post CABG in 2010, paroxysmal A. fib, intermittent heart block s/p PPM placed 4/5/2022 on Eliquis, history of DVTs on Eliquis, CVA in 2007  with no residual symptoms, hypertension, hyperlipidemia referred to the ED by his primary care physician for anemia    #CAD s/p CABG 2010  #Paroxysmal Afib on Eliquis  #Intermittent HB s/p PPM   - plan for cath  - cancelled due to anemia  - patient endorses hx of hematoma over left shoulder in Feb 2024  - no active bleeding   - GI recommends:   - May benefit from non urgent EGD and colonoscopy for workup of anemia   - Will need cardiology risk stratification. If patient is high risk, then would recommend cardiac cath prior to scope as patient is not bleeding and risks would outweigh benefits  - Trend Hb and keep active T/S. Transfuse as needed to keep Hb >7. Check iron studies  - In the setting of stable Hb since 04/2024 and absence of overt GI bleeding, recommend short acting AC from GI standpoint if benefits outweigh risks (last eliquis dose was on 05/07)  - Switch famotidine to protonix 40mg QD  - f/u with cardio  - f/u iron studies    #CKDIII- stable  -monitor renal function    #HLD  -c/w statin    #HTN  -c/w metoprolol    - DVT Prophylaxis: on lovenox  - GI Prophylaxis: protonix  - Diet: DASH/TLC  - Activity: as tolerated  - Code Status: Full Code

## 2024-05-11 NOTE — CONSULT NOTE ADULT - ATTENDING COMMENTS
I edited the note
Patient seen and examined. Pertinent labs, imaging and telemetry reviewed. I agree with the above:     Patient feeling well. Discussed with daughter over the phone.   Patient currently without CP, SOB. He also has not had change in stool, no overt bleeding or melena noted.   -Patient with HFrEF on repeat TTE April 2024 and PPM showing NSVT.   -Pt for LHC but aborted due to anemia.   -Unclear if having GIB but with history of AVMs in past.   -Currently HD stable and Hgb stable at 8.   -Transfuse for Hgb <8.   -Will discuss with IC and GI regarding whether diagnostic LHC will occur next week prior to endo/colo.   -Pt currently on lovenox with stable Hgb.   -Hold lovenox tomorrow for possible LHC on Monday.   -Continue with other cardiac meds.

## 2024-05-11 NOTE — CONSULT NOTE ADULT - SUBJECTIVE AND OBJECTIVE BOX
Outpt cardiologist: Dr. Castellon; EP: Dr. Moore    HPI:    73-year-old M with PMHx of HTN, HLD, CAD s/p 2V CABG in 2010 in [facility] (LIMA-LAD, SVG-RPDA), with Dx cath in 09/2020 with patent graft sites, paroxysmal A.fib (on Eliquis - last dose on 5/7/24), high degree AV block s/p PPM 4/5/2022, h/o DVTs, CVAs in 2007 and 2019 Right MCA infarct with residual left sided weakness, h/o GIB in 06/2022 requiring blood transfusion, with EGD and colonoscopy done at the time showing non-bleeding AVMs and large internal hemorhoids, who presented to his cardiologist with c/o weakness and increased fatigue.  Denies any chest pain, SOB, dizziness, n/v, diaphoresis, orthopnea, PND, LE edema, palpitations, syncope.  TTE 4/24/24 revealed EF of 37%, mild MR.  Pt was also found to have runs of NSVT upon checking his device.   In light of pt's risk factors, known CAD, newly depressed EF, and runs of NSVT, he is now referred for [facility] with possible intervention if clinically indicated.   ****Off Note: Outpt labs noted to have Hb of 8.2 (4/24/24), repeat CBC drawn and pending, and then will decide if to proceed with cath at this time.    Cath 9/2020:  Left main: Mild disease  LAD: Moderate disease in mid-segment 70 % diffuse lesion. Mid and distal vessel supplied by a patent LIMA graft  Diag: Luminal irregularities   Left Circumflex: luminal irregularities, mid - mild disease  OM: luminal irregularities  Right Coronary Artery: 60% mid-segment stenosis  RPDA: 100% at the ostium. Supplied by a patent SVG    Grafts:  LIMA to LAD - patent  SVG to RPDA - patent        ---  cardio fellow additional notes:    Patient was transferred to  telemetry. cath aborted due to hemoglobin 8.5 on admission and In light of prev hx of GIB 2/2 hemrrhoids and AVM.         PAST MEDICAL & SURGICAL HISTORY  CAD (coronary artery disease)  s/p stenting and CABG 10 years ago    HLD (hyperlipidemia)    Stroke  10 years ago as per MD (2006, 2009, 2019- HAS FORGETFULNESS AND MOOD CHANGES SINCE LAST STROKE)    DVT, lower extremity  January 2019    Afib    HTN (hypertension)    S/P CABG (coronary artery bypass graft)    History of hernia repair    History of surgery  THROMBECTOMY    Pacemaker        FAMILY HISTORY:  FAMILY HISTORY:  FH: heart disease (Mother)        SOCIAL HISTORY:  Social History:      ALLERGIES:  No Known Allergies      MEDICATIONS:  atorvastatin 20 milliGRAM(s) Oral at bedtime  chlorhexidine 4% Liquid 1 Application(s) Topical once  enalapril 10 milliGRAM(s) Oral daily  enoxaparin Injectable 80 milliGRAM(s) SubCutaneous every 12 hours  famotidine    Tablet 40 milliGRAM(s) Oral daily  metoprolol succinate ER 50 milliGRAM(s) Oral daily    PRN:      HOME MEDICATIONS:  Home Medications:  atorvastatin 20 mg oral tablet: 1 tab(s) orally once a day (at bedtime) (10 May 2024 13:14)  Eliquis 2.5 mg oral tablet: 1 tab(s) orally 2 times a day (10 May 2024 14:05)  enalapril 10 mg oral tablet: 1 tab(s) orally once a day (10 May 2024 13:14)  Ferrex 150 Plus oral capsule: 1 cap(s) orally once a day (10 May 2024 14:05)  metoprolol succinate 50 mg oral tablet, extended release: 1 tab(s) orally once a day (10 May 2024 13:14)  Pepcid 40 mg oral tablet: 1 tab(s) orally once a day (10 May 2024 14:05)      VITALS:   T(F): 98 (05-11 @ 05:00), Max: 98.5 (05-10 @ 19:53)  HR: 77 (05-11 @ 05:00) (75 - 94)  BP: 121/75 (05-11 @ 05:00) (121/75 - 142/75)  BP(mean): 97 (05-10 @ 12:55) (97 - 97)  RR: 18 (05-11 @ 05:00) (14 - 18)  SpO2: 100% (05-11 @ 05:00) (99% - 100%)    I&O's Summary    11 May 2024 07:01  -  11 May 2024 12:07  --------------------------------------------------------  IN: 210 mL / OUT: 0 mL / NET: 210 mL        REVIEW OF SYSTEMS:  CONSTITUTIONAL: No weakness, fevers or chills  HEENT: No visual changes, neck/ear pain  RESPIRATORY: No cough, sob  CARDIOVASCULAR: See HPI  GASTROINTESTINAL: No abdominal pain. No nausea, vomiting, diarrhea   GENITOURINARY: No dysuria, frequency or hematuria  NEUROLOGICAL: No new focal deficits  SKIN: No new rashes    PHYSICAL EXAM:  General: Not in distress.  Non-toxic appearing.   HEENT: EOMI  Cardio: regular, S1, S2, no murmur  Pulm: B/L BS.  No wheezing / crackles / rales  Abdomen: Soft, non-tender, non-distended. Normoactive bowel sounds  Extremities: No edema b/l le  Neuro: A&O x3. No focal deficits    LABS:                        8.0    4.79  )-----------( 230      ( 11 May 2024 08:01 )             28.1     05-11    141  |  108  |  26<H>  ----------------------------<  105<H>  4.2   |  25  |  0.9    Ca    8.7      11 May 2024 08:01  Mg     1.8     05-11    TPro  6.4  /  Alb  3.5  /  TBili  0.4  /  DBili  x   /  AST  73<H>  /  ALT  69<H>  /  AlkPhos  161<H>  05-11              HS Troponin trend:   proBNP:             RADIOLOGY:  -CXR:  -TTE:  -CCTA:  -STRESS TEST:  -CATHETERIZATION:  -OTHER:  ECG:      TELEMETRY EVENTS:   Outpt cardiologist: Dr. Castellon; EP: Dr. Moore    HPI:    73-year-old M with PMHx of HTN, HLD, CAD s/p 2V CABG in 2010 in [facility] (LIMA-LAD, SVG-RPDA), with Dx cath in 09/2020 with patent graft sites, paroxysmal A.fib (  CHADS VASC= 6, on Eliquis - last dose on 5/7/24), high degree AV block s/p PPM 4/5/2022, h/o DVTs, CVAs in 2007 and 2019 Right MCA infarct with residual left sided weakness, h/o GIB in 06/2022 requiring blood transfusion, with EGD and colonoscopy done at the time showing non-bleeding AVMs and large internal hemorhoids, who presented to his cardiologist with c/o weakness and increased fatigue.  Denies any chest pain, SOB, dizziness, n/v, diaphoresis, orthopnea, PND, LE edema, palpitations, syncope.  TTE 4/24/24 revealed EF of 37%, mild MR.  Pt was also found to have runs of NSVT upon checking his device.   In light of pt's risk factors, known CAD, newly depressed EF, and runs of NSVT, he was now referred for LHC with possible intervention if clinically indicated in 4/29/24.   ****Off Note: Outpt labs noted to have Hb of 8.2 (4/24/24), repeat CBC drawn and pending, and then will decide if to proceed with cath at this time.    Cath 9/2020:  Left main: Mild disease  LAD: Moderate disease in mid-segment 70 % diffuse lesion. Mid and distal vessel supplied by a patent LIMA graft  Diag: Luminal irregularities   Left Circumflex: luminal irregularities, mid - mild disease  OM: luminal irregularities  Right Coronary Artery: 60% mid-segment stenosis  RPDA: 100% at the ostium. Supplied by a patent SVG    Grafts:  LIMA to LAD - patent  SVG to RPDA - patent        ---  cardio fellow additional notes:    Patient was transferred to  telemetry. cath aborted due to hemoglobin 8.5 on admission and In light of prev hx of GIB 2/2 hemrrhoids and AVM.  Currently Hb- 8.0    Active meds: atorvastatin 20mg, enalapril 10mg, metoprolol 50mg QD, off eliquis         PAST MEDICAL & SURGICAL HISTORY  CAD (coronary artery disease)  s/p stenting and CABG 10 years ago    HLD (hyperlipidemia)    Stroke  10 years ago as per MD (2006, 2009, 2019- HAS FORGETFULNESS AND MOOD CHANGES SINCE LAST STROKE)    DVT, lower extremity  January 2019    Afib    HTN (hypertension)    S/P CABG (coronary artery bypass graft)    History of hernia repair    History of surgery  THROMBECTOMY    Pacemaker        FAMILY HISTORY:  FAMILY HISTORY:  FH: heart disease (Mother)        SOCIAL HISTORY:  Social History:      ALLERGIES:  No Known Allergies      MEDICATIONS:  atorvastatin 20 milliGRAM(s) Oral at bedtime  chlorhexidine 4% Liquid 1 Application(s) Topical once  enalapril 10 milliGRAM(s) Oral daily  enoxaparin Injectable 80 milliGRAM(s) SubCutaneous every 12 hours  famotidine    Tablet 40 milliGRAM(s) Oral daily  metoprolol succinate ER 50 milliGRAM(s) Oral daily    PRN:      HOME MEDICATIONS:  Home Medications:  atorvastatin 20 mg oral tablet: 1 tab(s) orally once a day (at bedtime) (10 May 2024 13:14)  Eliquis 2.5 mg oral tablet: 1 tab(s) orally 2 times a day (10 May 2024 14:05)  enalapril 10 mg oral tablet: 1 tab(s) orally once a day (10 May 2024 13:14)  Ferrex 150 Plus oral capsule: 1 cap(s) orally once a day (10 May 2024 14:05)  metoprolol succinate 50 mg oral tablet, extended release: 1 tab(s) orally once a day (10 May 2024 13:14)  Pepcid 40 mg oral tablet: 1 tab(s) orally once a day (10 May 2024 14:05)      VITALS:   T(F): 98 (05-11 @ 05:00), Max: 98.5 (05-10 @ 19:53)  HR: 77 (05-11 @ 05:00) (75 - 94)  BP: 121/75 (05-11 @ 05:00) (121/75 - 142/75)  BP(mean): 97 (05-10 @ 12:55) (97 - 97)  RR: 18 (05-11 @ 05:00) (14 - 18)  SpO2: 100% (05-11 @ 05:00) (99% - 100%)    I&O's Summary    11 May 2024 07:01  -  11 May 2024 12:07  --------------------------------------------------------  IN: 210 mL / OUT: 0 mL / NET: 210 mL        REVIEW OF SYSTEMS:  CONSTITUTIONAL: No weakness, fevers or chills  HEENT: No visual changes, neck/ear pain  RESPIRATORY: No cough, sob  CARDIOVASCULAR: See HPI  GASTROINTESTINAL: No abdominal pain. No nausea, vomiting, diarrhea   GENITOURINARY: No dysuria, frequency or hematuria  NEUROLOGICAL: No new focal deficits  SKIN: No new rashes    PHYSICAL EXAM:  General: Not in distress.  Non-toxic appearing.   HEENT: EOMI  Cardio: regular, S1, S2, no murmur  Pulm: B/L BS.  No wheezing / crackles / rales  Abdomen: Soft, non-tender, non-distended. Normoactive bowel sounds  Extremities: No edema b/l le  Neuro: A&O x3. No focal deficits    LABS:                        8.0    4.79  )-----------( 230      ( 11 May 2024 08:01 )             28.1     05-11    141  |  108  |  26<H>  ----------------------------<  105<H>  4.2   |  25  |  0.9    Ca    8.7      11 May 2024 08:01  Mg     1.8     05-11    TPro  6.4  /  Alb  3.5  /  TBili  0.4  /  DBili  x   /  AST  73<H>  /  ALT  69<H>  /  AlkPhos  161<H>  05-11              HS Troponin trend:   proBNP:             RADIOLOGY:  -CXR:  -TTE:  -CCTA:  -STRESS TEST:  -CATHETERIZATION:  -OTHER:  ECG:      TELEMETRY EVENTS:   Outpt cardiologist: Dr. Castellon; EP: Dr. Moore    HPI:    73-year-old M with PMHx of HTN, HLD, CAD s/p 2V CABG in 2010 in [facility] (LIMA-LAD, SVG-RPDA), with Dx cath in 09/2020 with patent graft sites, paroxysmal A.fib (  CHADS VASC= 6, on Eliquis - last dose on 5/7/24), high degree AV block s/p PPM 4/5/2022, h/o DVTs, CVAs in 2007 and 2019 Right MCA infarct with residual left sided weakness, h/o GIB in 06/2022 requiring blood transfusion, with EGD and colonoscopy done at the time showing non-bleeding AVMs and large internal hemorhoids, who presented to his cardiologist with c/o weakness and increased fatigue.  Denies any chest pain, SOB, dizziness, n/v, diaphoresis, orthopnea, PND, LE edema, palpitations, syncope.  TTE 4/24/24 revealed EF of 37%, mild MR.  Pt was also found to have runs of NSVT upon checking his device.   In light of pt's risk factors, known CAD, newly depressed EF, and runs of NSVT, he was now referred for LHC with possible intervention if clinically indicated in 4/29/24.   ****Off Note: Outpt labs noted to have Hb of 8.2 (4/24/24), repeat CBC drawn and pending, and then will decide if to proceed with cath at this time.    Cath 9/2020:  Left main: Mild disease  LAD: Moderate disease in mid-segment 70 % diffuse lesion. Mid and distal vessel supplied by a patent LIMA graft  Diag: Luminal irregularities   Left Circumflex: luminal irregularities, mid - mild disease  OM: luminal irregularities  Right Coronary Artery: 60% mid-segment stenosis  RPDA: 100% at the ostium. Supplied by a patent SVG    Grafts:  LIMA to LAD - patent  SVG to RPDA - patent        ---  cardio fellow additional notes:    Patient was transferred to  telemetry. cath aborted due to hemoglobin 8.5 on admission and In light of prev hx of GIB 2/2 hemrrhoids and AVM.  Currently Hb- 8.0    Has been feeling weak since 2/2024. Fractured his left shoulder and arm with subsequent hematoma. s/p splint. Since then he walks 10 minutes and would need to rest 5-10 minutes before continue walking because of weakness in his lower extremities. Does not feel short of breath or chest pain.     Active meds: atorvastatin 20mg, enalapril 10mg, metoprolol 50mg QD, off eliquis since 5/7/24         PAST MEDICAL & SURGICAL HISTORY  CAD (coronary artery disease)  s/p stenting and CABG 10 years ago    HLD (hyperlipidemia)    Stroke  10 years ago as per MD (2006, 2009, 2019- HAS FORGETFULNESS AND MOOD CHANGES SINCE LAST STROKE)    DVT, lower extremity  January 2019    Afib    HTN (hypertension)    S/P CABG (coronary artery bypass graft)    History of hernia repair    History of surgery  THROMBECTOMY    Pacemaker        FAMILY HISTORY:  FAMILY HISTORY:  FH: heart disease (Mother)        SOCIAL HISTORY:  Social History:      ALLERGIES:  No Known Allergies      MEDICATIONS:  atorvastatin 20 milliGRAM(s) Oral at bedtime  chlorhexidine 4% Liquid 1 Application(s) Topical once  enalapril 10 milliGRAM(s) Oral daily  enoxaparin Injectable 80 milliGRAM(s) SubCutaneous every 12 hours  famotidine    Tablet 40 milliGRAM(s) Oral daily  metoprolol succinate ER 50 milliGRAM(s) Oral daily    PRN:      HOME MEDICATIONS:  Home Medications:  atorvastatin 20 mg oral tablet: 1 tab(s) orally once a day (at bedtime) (10 May 2024 13:14)  Eliquis 2.5 mg oral tablet: 1 tab(s) orally 2 times a day (10 May 2024 14:05)  enalapril 10 mg oral tablet: 1 tab(s) orally once a day (10 May 2024 13:14)  Ferrex 150 Plus oral capsule: 1 cap(s) orally once a day (10 May 2024 14:05)  metoprolol succinate 50 mg oral tablet, extended release: 1 tab(s) orally once a day (10 May 2024 13:14)  Pepcid 40 mg oral tablet: 1 tab(s) orally once a day (10 May 2024 14:05)      VITALS:   T(F): 98 (05-11 @ 05:00), Max: 98.5 (05-10 @ 19:53)  HR: 77 (05-11 @ 05:00) (75 - 94)  BP: 121/75 (05-11 @ 05:00) (121/75 - 142/75)  BP(mean): 97 (05-10 @ 12:55) (97 - 97)  RR: 18 (05-11 @ 05:00) (14 - 18)  SpO2: 100% (05-11 @ 05:00) (99% - 100%)    I&O's Summary    11 May 2024 07:01  -  11 May 2024 12:07  --------------------------------------------------------  IN: 210 mL / OUT: 0 mL / NET: 210 mL        REVIEW OF SYSTEMS:  CONSTITUTIONAL:  (+) weakness   HEENT: No visual changes, neck/ear pain  RESPIRATORY: No cough, sob  CARDIOVASCULAR: See HPI  GASTROINTESTINAL: No abdominal pain. No nausea, vomiting, diarrhea   GENITOURINARY: No dysuria, frequency or hematuria  NEUROLOGICAL: No new focal deficits  SKIN:l (+) recent fracture    PHYSICAL EXAM:  General: Not in distress.  Non-toxic appearing.   HEENT: EOMI  Cardio: regular, S1, S2, no murmur  Pulm: B/L BS.  No wheezing / crackles / rales  Abdomen: Soft, non-tender, non-distended. Normoactive bowel sounds  Extremities: No edema b/l le  Neuro: A&O x3. No focal deficits    LABS:                        8.0    4.79  )-----------( 230      ( 11 May 2024 08:01 )             28.1     05-11    141  |  108  |  26<H>  ----------------------------<  105<H>  4.2   |  25  |  0.9    Ca    8.7      11 May 2024 08:01  Mg     1.8     05-11    TPro  6.4  /  Alb  3.5  /  TBili  0.4  /  DBili  x   /  AST  73<H>  /  ALT  69<H>  /  AlkPhos  161<H>  05-11              HS Troponin trend:   proBNP:             RADIOLOGY:  -CXR:  -TTE:  -CCTA:  -STRESS TEST:  -CATHETERIZATION:  -OTHER:  ECG:      TELEMETRY EVENTS:  4 beats NSVT, some afib with aberrancy, otherwise is

## 2024-05-11 NOTE — PROGRESS NOTE ADULT - SUBJECTIVE AND OBJECTIVE BOX
24H events:    Patient is a 73y old Male who presents with a chief complaint of     Today is hospital day 1d. This morning patient was seen and examined at bedside, resting comfortably in bed.    No acute or major events overnight.    Code Status: Full code    PAST MEDICAL & SURGICAL HISTORY  CAD (coronary artery disease)  s/p stenting and CABG 10 years ago    HLD (hyperlipidemia)    Stroke  10 years ago as per MD (2006, 2009, 2019- HAS FORGETFULNESS AND MOOD CHANGES SINCE LAST STROKE)    DVT, lower extremity  January 2019    Afib    HTN (hypertension)    S/P CABG (coronary artery bypass graft)    History of hernia repair    History of surgery  THROMBECTOMY    Pacemaker      SOCIAL HISTORY:  Social History:      ALLERGIES:  No Known Allergies    MEDICATIONS:  STANDING MEDICATIONS  atorvastatin 20 milliGRAM(s) Oral at bedtime  chlorhexidine 4% Liquid 1 Application(s) Topical once  enalapril 10 milliGRAM(s) Oral daily  enoxaparin Injectable 80 milliGRAM(s) SubCutaneous every 12 hours  famotidine    Tablet 40 milliGRAM(s) Oral daily  metoprolol succinate ER 50 milliGRAM(s) Oral daily    PRN MEDICATIONS    VITALS:   T(F): 98  HR: 77  BP: 121/75  RR: 18  SpO2: 100%    PHYSICAL EXAM:   GENERAL: NAD, lying in bed comfortably  HEAD:  Atraumatic, Normocephalic  EYES: EOMI, sclera clear  ENT: Moist mucous membranes  NECK: Supple, trachea midline  CHEST/LUNG: Clear to auscultation anteriorly bilaterally; No rales, rhonchi, wheezing, or rubs. Unlabored respirations  HEART: Regular rate and rhythm; No appreciable murmurs, rubs, or gallops  ABDOMEN: Soft, nontender, nondistended  EXTREMITIES:  No clubbing, cyanosis, or edema  NERVOUS SYSTEM:  A&Ox3, no noted facial droop. Moving all extremities w/o difficulty.   SKIN: No rashes or lesions to b/l forearm, face.     (  ) Indwelling Bronson Catheter:   Date insterted:    Reason (  ) Critical illness     (  ) urinary retention    (  ) Accurate Ins/Outs Monitoring     (  ) CMO patient    (  ) Central Line:   Date inserted:  Location: (  ) Right IJ     (  ) Left IJ     (  ) Right Fem     (  ) Left Fem    (  ) SPC        (  ) pigtail       (  ) PEG tube       (  ) colostomy       (  ) jejunostomy  (  ) U-Dall    LABS:                        8.0    4.65  )-----------( 230      ( 10 May 2024 13:01 )             27.1     05-10    144  |  111<H>  |  28<H>  ----------------------------<  110<H>  4.1   |  25  |  1.0    Ca    8.7      10 May 2024 13:01        Urinalysis Basic - ( 10 May 2024 13:01 )    Color: x / Appearance: x / SG: x / pH: x  Gluc: 110 mg/dL / Ketone: x  / Bili: x / Urobili: x   Blood: x / Protein: x / Nitrite: x   Leuk Esterase: x / RBC: x / WBC x   Sq Epi: x / Non Sq Epi: x / Bacteria: x

## 2024-05-11 NOTE — PROGRESS NOTE ADULT - SUBJECTIVE AND OBJECTIVE BOX
ARELI PITTMAN  73y Male    CHIEF COMPLAINT:    Patient is a 73y old  Male who presents with a chief complaint of     INTERVAL HPI/OVERNIGHT EVENTS:    Patient seen and examined.    ROS: All other systems are negative.    Vital Signs:    T(F): 98 (24 @ 05:00), Max: 98.5 (05-10-24 @ 19:53)  HR: 77 (24 @ 05:00) (75 - 94)  BP: 121/75 (24 @ 05:00) (121/75 - 142/75)  RR: 18 (24 @ 05:00) (14 - 18)  SpO2: 100% (24 @ 05:00) (99% - 100%)  I&O's Summary    Daily Height in cm: 167.64 (10 May 2024 12:55)    Daily Weight in k.5 (10 May 2024 19:53)  CAPILLARY BLOOD GLUCOSE          PHYSICAL EXAM:    GENERAL:  NAD  SKIN: No rashes or lesions  HENT: Atraumatic. Normocephalic. PERRL. Moist membranes.  NECK: Supple, No JVD. No lymphadenopathy.  PULMONARY: CTA B/L. No wheezing. No rales  CVS: Normal S1, S2. Rate and Rhythm are regular. No murmurs.  ABDOMEN/GI: Soft, Nontender, Nondistended; BS present  EXTREMITIES: Peripheral pulses intact. No edema B/L LE.  NEUROLOGIC:  No motor or sensory deficit.  PSYCH: Alert & oriented x 3    Consultant(s) Notes Reviewed:  [x ] YES  [ ] NO  Care Discussed with Consultants/Other Providers [ x] YES  [ ] NO    EKG reviewed  Telemetry reviewed    LABS:                        8.0    4.65  )-----------( 230      ( 10 May 2024 13:01 )             27.1     05-10    144  |  111<H>  |  28<H>  ----------------------------<  110<H>  4.1   |  25  |  1.0    Ca    8.7      10 May 2024 13:01                RADIOLOGY & ADDITIONAL TESTS:      Imaging or report Personally Reviewed:  [ ] YES  [ ] NO    Medications:  Standing  atorvastatin 20 milliGRAM(s) Oral at bedtime  chlorhexidine 4% Liquid 1 Application(s) Topical once  enalapril 10 milliGRAM(s) Oral daily  famotidine    Tablet 40 milliGRAM(s) Oral daily  metoprolol succinate ER 50 milliGRAM(s) Oral daily    PRN Meds      Case discussed with resident    Care discussed with pt/family           ARELI PITTMAN  73y Male    CHIEF COMPLAINT:    Patient is a 73y old  Male who presents with a chief complaint of sob.     INTERVAL HPI/OVERNIGHT EVENTS:    Patient seen and examined. Interviewed the pt with the help of . Denies any bleeding. No melena. C/O sob    ROS: All other systems are negative.    Vital Signs:    T(F): 98 (24 @ 05:00), Max: 98.5 (05-10-24 @ 19:53)  HR: 77 (24 @ 05:00) (75 - 94)  BP: 121/75 (24 @ 05:00) (121/75 - 142/75)  RR: 18 (24 @ 05:00) (14 - 18)  SpO2: 100% (24 @ 05:00) (99% - 100%)  I&O's Summary    Daily Height in cm: 167.64 (10 May 2024 12:55)    Daily Weight in k.5 (10 May 2024 19:53)  CAPILLARY BLOOD GLUCOSE          PHYSICAL EXAM:    GENERAL:  NAD  SKIN: No rashes or lesions  HENT: Atraumatic. Normocephalic. PERRL. Moist membranes.  NECK: Supple, No JVD. No lymphadenopathy.  PULMONARY: CTA B/L. No wheezing. No rales  CVS: Normal S1, S2. Rate and Rhythm are regular. No murmurs.  ABDOMEN/GI: Soft, Nontender, Nondistended; BS present  EXTREMITIES: Peripheral pulses intact. No edema B/L LE.  NEUROLOGIC:  No motor or sensory deficit.  PSYCH: Alert & oriented x 3    Consultant(s) Notes Reviewed:  [x ] YES  [ ] NO  Care Discussed with Consultants/Other Providers [ x] YES  [ ] NO    EKG reviewed  Telemetry reviewed    LABS:                        8.0    4.65  )-----------( 230      ( 10 May 2024 13:01 )             27.1   Hemoglobin: 8.0 g/dL ( @ 08:01)  Hemoglobin: 8.0 g/dL (05-10 @ 13:01)  Hemoglobin: 8.5 g/dL (05-10 @ 13:00)    05-10    144  |  111<H>  |  28<H>  ----------------------------<  110<H>  4.1   |  25  |  1.0    Ca    8.7      10 May 2024 13:01                RADIOLOGY & ADDITIONAL TESTS:      Imaging or report Personally Reviewed:  [x ] YES  [ ] NO    Medications:  Standing  atorvastatin 20 milliGRAM(s) Oral at bedtime  chlorhexidine 4% Liquid 1 Application(s) Topical once  enalapril 10 milliGRAM(s) Oral daily  famotidine    Tablet 40 milliGRAM(s) Oral daily  metoprolol succinate ER 50 milliGRAM(s) Oral daily    PRN Meds      Case discussed with resident    Care discussed with pt/family

## 2024-05-11 NOTE — CONSULT NOTE ADULT - CONSULT REASON
Anemia
74 y/o M cath cancelled due to anemia (patient endorses hx of left shoulder hematoma in 2/24) f/u plan regarding cath

## 2024-05-11 NOTE — PROGRESS NOTE ADULT - ASSESSMENT
73-year-old M with PMHx of HTN, HLD, CAD s/p 2V CABG in 2010 in [facility] (LIMA-LAD, SVG-RPDA), with Dx cath in 09/2020 with patent graft sites, paroxysmal A.fib (on Eliquis - last dose on 5/7/24), high degree AV block s/p PPM 4/5/2022, h/o DVTs, CVAs in 2007 and 2019 Right MCA infarct with residual left sided weakness, h/o GIB in 06/2022 requiring blood transfusion, with EGD and colonoscopy done at the time showing non-bleeding AVMs and large internal hemorhoids, who presented to his cardiologist with c/o weakness and increased fatigue.    TTE 4/24/24 revealed EF of 37%, mild MR.  Pt was also found to have runs of NSVT upon checking his device. Pt was admitted for LHC and w/u for anemia.     Microcytic Anemia  CAD s/p CABG  HTN / DL  PAF on Eliquis  High degree AV block s/p PPM  H/O CVA  H/O Chronic DVT                 PLAN:    ·	Tele reviewed  ·	EKG on admission  ·	Old record reviewed. Hb on 2/3/24 was 12.6. On admission it is 8.5           73-year-old M with PMH of HTN, HLD, CAD s/p 2V CABG in 2010 in [facility] (LIMA-LAD, SVG-RPDA), with Dx cath in 09/2020 with patent graft sites, paroxysmal A.fib (on Eliquis - last dose on 5/7/24), high degree AV block s/p PPM 4/5/2022, h/o DVTs, CVAs in 2007 and 2019 Right MCA infarct with residual left sided weakness, h/o GIB in 06/2022 requiring blood transfusion, with EGD and colonoscopy done at the time showing non-bleeding AVMs and large internal hemorrhoids who presented to his cardiologist with c/o weakness and increased fatigue. Pt denies any rectal bleeding or melena.    TTE 4/24/24 revealed EF of 37%, mild MR.  Pt was also found to have runs of NSVT upon checking his device. Pt was admitted for LHC and w/u for anemia.     Microcytic Anemia  CAD s/p CABG  HTN / DL  PAF on Eliquis  High degree AV block s/p PPM  H/O CVA  H/O Chronic DVT  NSVT                 PLAN:    ·	Tele reviewed. Episode of NSVT  ·	EKG on admission:   ·	Old record reviewed. Hb on 2/3/24 was 12.6. On admission it is 8.5  ·	GI eval noted. As per GI H/H is stable recently. No active bleeding. No further inpatient w/u. Cardiac risk stratification if inpatient w/u needed  ·	Monitor H/H  ·	Iron studies. Check retic count.   ·	Cardiology eval           73-year-old M with PMH of HTN, HLD, CAD s/p 2V CABG in 2010 in [facility] (LIMA-LAD, SVG-RPDA), with Dx cath in 09/2020 with patent graft sites, paroxysmal A.fib (on Eliquis - last dose on 5/7/24), high degree AV block s/p PPM 4/5/2022, h/o DVTs, CVAs in 2007 and 2019 Right MCA infarct with residual left sided weakness, h/o GIB in 06/2022 requiring blood transfusion, with EGD and colonoscopy done at the time showing non-bleeding AVMs and large internal hemorrhoids who presented to his cardiologist with c/o weakness and increased fatigue. Pt denies any rectal bleeding or melena.    TTE 4/24/24 revealed EF of 37%, mild MR.  Pt was also found to have runs of NSVT upon checking his device. Pt was admitted for LHC and w/u for anemia.     Microcytic Anemia  CAD s/p CABG  HTN / DL  PAF on Eliquis  High degree AV block s/p PPM  H/O CVA  H/O Chronic DVT  NSVT                 PLAN:    ·	Tele reviewed. Episode of NSVT  ·	EKG on admission:   ·	Old record reviewed. Hb on 2/3/24 was 12.6. On admission it is 8.5  ·	GI eval noted. As per GI H/H is stable recently. No active bleeding. No further inpatient w/u. Cardiac risk stratification if inpatient w/u needed  ·	Monitor H/H  ·	Iron studies. Check retic count.   ·	Cardiology eval    Progress Note Handoff    Pending (specify):  Consults_Cardiology________, Tests________, Test Results_______, Other_________  Family discussion:  Disposition: Home___/SNF___/Other________/Unknown at this time________    Isac Brufmield MD  Spectra: [id number]

## 2024-05-11 NOTE — CONSULT NOTE ADULT - ASSESSMENT
NOTE INCOMPLETE 74 YO M with PMHx of HTN, HLD, CAD s/p 2V CABG in 2010 in [facility] (LIMA-LAD, SVG-RPDA), with Dx cath in 09/2020 with patent graft, paroxysmal A.fib (CHADS VASC= 6, on Eliquis - last dose on 5/7/24), high degree AV block s/p PPM 4/5/2022, h/o DVTs, CVAs in 2007 and 2019 Right MCA infarct with residual left sided weakness, h/o GIB in 06/2022 requiring blood transfusion (EGD and colonoscopy done at the time showing non-bleeding AVMs and large internal hemorrhoids) who presented to his cardiologist with c/o weakness and increased fatigue and was found to have reduced EF. In light of pt's risk factors, known CAD, newly depressed EF, and runs of NSVT (on device interrogation), he was now referred for LHC. Cath was canceled due to low Hb and history of bleeding.    IMPRESSION  #Newly reduced EF  #Hx of CAD s/p CABG (LIMA-LAD, SVG-RPDA)  - Cath 9/2020: Left main: Mild disease. LAD: Moderate disease in mid-segment 70 % diffuse lesion. Mid and distal vessel supplied by a patent LIMA graft. RCA: 60% mid-segment stenosis. RPDA: 100% at the ostium. Supplied by a patent SVG.  #High degree AV block s/p PPM  #Afib on Eliquis  - CHADSVASc at least 6   - Eliquis - last dose on 5/7/24  - Toprol 50mg PO QD  #Hx of GI bleed in 2022  #Acute on Chronic Microcytic Anemia   - EGD and colonoscopy done at the time showing non-bleeding AVMs and large internal hemorrhoids  #Preop risk stratification  - Patient is able to achieve more than 4 METS. No High-risk patient features including active MI, Unstable or severe angina, Decompensated HF, Severe valvular disease.   - Class IV risk --> >15% risk of perioperative MACE  #HTN, HLD  #Hx of CVA with residual defects       PLAN  - Moderate risk patient low-moderate risk procedure.        - This consult serves only as a sarah-operative cardiac risk stratification and evaluation to predict 30-days cardiac complications risk and mortality. The decision to proceed with the surgery/procedure is made by the performing physician and the patient -   72 YO M with PMHx of HTN, HLD, CAD s/p 2V CABG in 2010 in [facility] (LIMA-LAD, SVG-RPDA), with Dx cath in 09/2020 with patent graft, paroxysmal A.fib (CHADS VASC= 6, on Eliquis - last dose on 5/7/24), high degree AV block s/p PPM 4/5/2022, h/o DVTs, CVAs in 2007 and 2019 Right MCA infarct with residual left sided weakness, h/o GIB in 06/2022 requiring blood transfusion (EGD and colonoscopy done at the time showing non-bleeding AVMs and large internal hemorrhoids) who presented to his cardiologist with c/o weakness and increased fatigue and was found to have reduced EF. In light of pt's risk factors, known CAD, newly depressed EF, and runs of NSVT (on device interrogation), he was now referred for C. Cath was canceled due to low Hb and history of bleeding.    IMPRESSION  #Newly reduced EF  #Hx of CAD s/p CABG (LIMA-LAD, SVG-RPDA)  - Cath 9/2020: Left main: Mild disease. LAD: Moderate disease in mid-segment 70 % diffuse lesion. Mid and distal vessel supplied by a patent LIMA graft. RCA: 60% mid-segment stenosis. RPDA: 100% at the ostium. Supplied by a patent SVG.  - follow up GI recs pending EGD/colonoscopy prior to C  - cont statin  #High degree AV block s/p PPM  #Afib on Eliquis  - CHADSVASc at least 6   - Eliquis - last dose on 5/7/24. currently on lovenox 80mg BID  - Toprol 50mg PO QD  #Hx of GI bleed in 2022  #Acute on Chronic Microcytic Anemia   - EGD and colonoscopy done at the time showing non-bleeding AVMs and large internal hemorrhoids  #Preop risk stratification  - Patient is able to achieve more than 4 METS. No High-risk patient features including active MI, Unstable or severe angina, Decompensated HF, Severe valvular disease.   - Class IV risk --> >15% risk of perioperative MACE  #HTN, HLD  - cont enalapril  #Hx of CVA with residual defects       PLAN  - Moderate risk patient low-moderate risk procedure.        - This consult serves only as a sarah-operative cardiac risk stratification and evaluation to predict 30-days cardiac complications risk and mortality. The decision to proceed with the surgery/procedure is made by the performing physician and the patient   72 YO M with PMHx of HTN, HLD, CAD s/p 2V CABG in 2010 in [facility] (LIMA-LAD, SVG-RPDA), with Dx cath in 09/2020 with patent graft, paroxysmal A.fib (CHADS VASC= 6, on Eliquis - last dose on 5/7/24), high degree AV block s/p PPM 4/5/2022, h/o DVTs, CVAs in 2007 and 2019 Right MCA infarct with residual left sided weakness, h/o GIB in 06/2022 requiring blood transfusion (EGD and colonoscopy done at the time showing non-bleeding AVMs and large internal hemorrhoids) who presented to his cardiologist with c/o weakness and increased fatigue and was found to have reduced EF. In light of pt's risk factors, known CAD, newly depressed EF, and runs of NSVT (on device interrogation), he was now referred for LHC. Cath was canceled due to low Hb and history of bleeding.    IMPRESSION  #Newly reduced EF  #Hx of CAD s/p CABG (LIMA-LAD, SVG-RPDA)  - Cath 9/2020: Left main: Mild disease. LAD: Moderate disease in mid-segment 70 % diffuse lesion. Mid and distal vessel supplied by a patent LIMA graft. RCA: 60% mid-segment stenosis. RPDA: 100% at the ostium. Supplied by a patent SVG.  #High degree AV block s/p PPM  #Afib on Eliquis  - CHADSVASc at least 6   - Eliquis - last dose on 5/7/24. currently on lovenox 80mg BID  - Toprol 50mg PO QD  #Hx of GI bleed in 2022  #Acute on Chronic Microcytic Anemia   - EGD and colonoscopy done at the time showing non-bleeding AVMs and large internal hemorrhoids  #Preop risk stratification  - Patient is able to achieve more than 4 METS. No High-risk patient features including active MI, Unstable or severe angina, Decompensated HF, Severe valvular disease.   - Class IV risk --> >15% risk of perioperative MACE  #HTN, HLD  #Hx of CVA with residual defects   #runs of NSVTs on pacemaker interrogation outpatient (longest run 24 beats)      PLAN  - tentative plan for cardiac cath on Monday   - keep Hb > 8  - please hold lovenox starting 5/11 for possible cath   - start aspirin 81mg daily

## 2024-05-12 LAB
ALBUMIN SERPL ELPH-MCNC: 3.1 G/DL — LOW (ref 3.5–5.2)
ALP SERPL-CCNC: 144 U/L — HIGH (ref 30–115)
ALT FLD-CCNC: 57 U/L — HIGH (ref 0–41)
ANION GAP SERPL CALC-SCNC: 7 MMOL/L — SIGNIFICANT CHANGE UP (ref 7–14)
AST SERPL-CCNC: 58 U/L — HIGH (ref 0–41)
BASOPHILS # BLD AUTO: 0.03 K/UL — SIGNIFICANT CHANGE UP (ref 0–0.2)
BASOPHILS NFR BLD AUTO: 0.6 % — SIGNIFICANT CHANGE UP (ref 0–1)
BILIRUB SERPL-MCNC: 0.3 MG/DL — SIGNIFICANT CHANGE UP (ref 0.2–1.2)
BUN SERPL-MCNC: 23 MG/DL — HIGH (ref 10–20)
CALCIUM SERPL-MCNC: 8.5 MG/DL — SIGNIFICANT CHANGE UP (ref 8.4–10.5)
CHLORIDE SERPL-SCNC: 109 MMOL/L — SIGNIFICANT CHANGE UP (ref 98–110)
CO2 SERPL-SCNC: 24 MMOL/L — SIGNIFICANT CHANGE UP (ref 17–32)
CREAT SERPL-MCNC: 1 MG/DL — SIGNIFICANT CHANGE UP (ref 0.7–1.5)
EGFR: 78 ML/MIN/1.73M2 — SIGNIFICANT CHANGE UP
EOSINOPHIL # BLD AUTO: 0.44 K/UL — SIGNIFICANT CHANGE UP (ref 0–0.7)
EOSINOPHIL NFR BLD AUTO: 8.3 % — HIGH (ref 0–8)
FERRITIN SERPL-MCNC: 12 NG/ML — LOW (ref 30–400)
GLUCOSE SERPL-MCNC: 113 MG/DL — HIGH (ref 70–99)
HCT VFR BLD CALC: 27.6 % — LOW (ref 42–52)
HGB BLD-MCNC: 7.9 G/DL — LOW (ref 14–18)
IMM GRANULOCYTES NFR BLD AUTO: 0.2 % — SIGNIFICANT CHANGE UP (ref 0.1–0.3)
LYMPHOCYTES # BLD AUTO: 2.07 K/UL — SIGNIFICANT CHANGE UP (ref 1.2–3.4)
LYMPHOCYTES # BLD AUTO: 38.9 % — SIGNIFICANT CHANGE UP (ref 20.5–51.1)
MAGNESIUM SERPL-MCNC: 1.6 MG/DL — LOW (ref 1.8–2.4)
MCHC RBC-ENTMCNC: 22.1 PG — LOW (ref 27–31)
MCHC RBC-ENTMCNC: 28.6 G/DL — LOW (ref 32–37)
MCV RBC AUTO: 77.3 FL — LOW (ref 80–94)
MONOCYTES # BLD AUTO: 0.93 K/UL — HIGH (ref 0.1–0.6)
MONOCYTES NFR BLD AUTO: 17.5 % — HIGH (ref 1.7–9.3)
NEUTROPHILS # BLD AUTO: 1.84 K/UL — SIGNIFICANT CHANGE UP (ref 1.4–6.5)
NEUTROPHILS NFR BLD AUTO: 34.5 % — LOW (ref 42.2–75.2)
NRBC # BLD: 0 /100 WBCS — SIGNIFICANT CHANGE UP (ref 0–0)
PLATELET # BLD AUTO: 221 K/UL — SIGNIFICANT CHANGE UP (ref 130–400)
PMV BLD: 12.1 FL — HIGH (ref 7.4–10.4)
POTASSIUM SERPL-MCNC: 4.2 MMOL/L — SIGNIFICANT CHANGE UP (ref 3.5–5)
POTASSIUM SERPL-SCNC: 4.2 MMOL/L — SIGNIFICANT CHANGE UP (ref 3.5–5)
PROT SERPL-MCNC: 5.9 G/DL — LOW (ref 6–8)
RBC # BLD: 3.57 M/UL — LOW (ref 4.7–6.1)
RBC # FLD: 16 % — HIGH (ref 11.5–14.5)
SODIUM SERPL-SCNC: 140 MMOL/L — SIGNIFICANT CHANGE UP (ref 135–146)
WBC # BLD: 5.32 K/UL — SIGNIFICANT CHANGE UP (ref 4.8–10.8)
WBC # FLD AUTO: 5.32 K/UL — SIGNIFICANT CHANGE UP (ref 4.8–10.8)

## 2024-05-12 PROCEDURE — 99233 SBSQ HOSP IP/OBS HIGH 50: CPT

## 2024-05-12 RX ORDER — MAGNESIUM SULFATE 500 MG/ML
2 VIAL (ML) INJECTION ONCE
Refills: 0 | Status: COMPLETED | OUTPATIENT
Start: 2024-05-12 | End: 2024-05-12

## 2024-05-12 RX ORDER — IRON SUCROSE 20 MG/ML
200 INJECTION, SOLUTION INTRAVENOUS EVERY 24 HOURS
Refills: 0 | Status: DISCONTINUED | OUTPATIENT
Start: 2024-05-12 | End: 2024-05-14

## 2024-05-12 RX ORDER — ENOXAPARIN SODIUM 100 MG/ML
80 INJECTION SUBCUTANEOUS ONCE
Refills: 0 | Status: COMPLETED | OUTPATIENT
Start: 2024-05-12 | End: 2024-05-12

## 2024-05-12 RX ORDER — ENOXAPARIN SODIUM 100 MG/ML
80 INJECTION SUBCUTANEOUS EVERY 12 HOURS
Refills: 0 | Status: DISCONTINUED | OUTPATIENT
Start: 2024-05-12 | End: 2024-05-12

## 2024-05-12 RX ADMIN — ATORVASTATIN CALCIUM 20 MILLIGRAM(S): 80 TABLET, FILM COATED ORAL at 21:20

## 2024-05-12 RX ADMIN — Medication 10 MILLIGRAM(S): at 05:32

## 2024-05-12 RX ADMIN — IRON SUCROSE 110 MILLIGRAM(S): 20 INJECTION, SOLUTION INTRAVENOUS at 13:44

## 2024-05-12 RX ADMIN — FAMOTIDINE 40 MILLIGRAM(S): 10 INJECTION INTRAVENOUS at 11:03

## 2024-05-12 RX ADMIN — Medication 25 GRAM(S): at 11:04

## 2024-05-12 RX ADMIN — ENOXAPARIN SODIUM 80 MILLIGRAM(S): 100 INJECTION SUBCUTANEOUS at 11:50

## 2024-05-12 RX ADMIN — Medication 50 MILLIGRAM(S): at 05:33

## 2024-05-12 RX ADMIN — Medication 81 MILLIGRAM(S): at 11:04

## 2024-05-12 NOTE — PROGRESS NOTE ADULT - SUBJECTIVE AND OBJECTIVE BOX
Gastroenterology Follow Up Note      Location: [facility] 3C 014 B ([facility] 3C)  Patient Name: ARELI PITTMAN  Age: 73y  Gender: Male      Chief Complaint  Patient is a 73y old Male who presents with a chief complaint of elective cath (11 May 2024 12:07)  Primary diagnosis of Atherosclerosis of native coronary artery without angina pectoris      Reason for Consult  Anemia      Progress Note  This morning patient was seen and examined at bedside.    Today is hospital day 2d.  Patient is doing fine. No acute events overnight.   Patient's appetite is adequate, and he is tolerating diet and denies nausea or vomiting.   Patient denies any abdominal pain.  Last bowel movement was soft and brown on 05/11.        Vital Signs in the last 24 hours   Vitals Summary T(C): 36.3 (05-12-24 @ 04:45), Max: 36.7 (05-11-24 @ 19:30)  HR: 62 (05-12-24 @ 04:45) (62 - 88)  BP: 116/71 (05-12-24 @ 04:45) (114/53 - 128/77)  RR: 18 (05-12-24 @ 04:45) (18 - 18)  SpO2: 97% (05-12-24 @ 04:45) (97% - 97%)  Vent Data   Intake/ Output   05-11-24 @ 07:01  -  05-12-24 @ 07:00  --------------------------------------------------------  IN: 830 mL / OUT: 0 mL / NET: 830 mL      Physical Exam  * General Appearance: Alert, cooperative, interactive, oriented to time, place, and person, in no acute distress  * Lungs: Good bilateral air entry, normal breath sounds (Clear to auscultation bilaterally  * Heart: Regular Rate and Rhythm, normal S1 and S2  * Abdomen: Symmetric, non-distended, no scar, soft, non-tender, bowel sounds active all four quadrants  * Rectal: Brown stool, Normal tone, no palpable masses or tenderness      Investigations   Laboratory Workup      - CBC:                        7.9    5.32  )-----------( 221      ( 12 May 2024 06:23 )             27.6       - Hgb Trend:  7.9  05-12-24 @ 06:23  8.0  05-11-24 @ 08:01  8.0  05-10-24 @ 13:01  8.5  05-10-24 @ 13:00          - Chemistry:  05-12    140  |  109  |  23<H>  ----------------------------<  113<H>  4.2   |  24  |  1.0    Ca    8.5      12 May 2024 06:23  Mg     1.6     05-12    TPro  5.9<L>  /  Alb  3.1<L>  /  TBili  0.3  /  DBili  x   /  AST  58<H>  /  ALT  57<H>  /  AlkPhos  144<H>  05-12    Liver panel trend:  TBili 0.3   /   AST 58   /   ALT 57   /   AlkP 144   /   Tptn 5.9   /   Alb 3.1    /   DBili --      05-12  TBili 0.4   /   AST 73   /   ALT 69   /   AlkP 161   /   Tptn 6.4   /   Alb 3.5    /   DBili --      05-11      Microbiological Workup  Urinalysis Basic - ( 12 May 2024 06:23 )    Color: x / Appearance: x / SG: x / pH: x  Gluc: 113 mg/dL / Ketone: x  / Bili: x / Urobili: x   Blood: x / Protein: x / Nitrite: x   Leuk Esterase: x / RBC: x / WBC x   Sq Epi: x / Non Sq Epi: x / Bacteria: x        Current Medications  Standing Medications  aspirin  chewable 81 milliGRAM(s) Oral daily  atorvastatin 20 milliGRAM(s) Oral at bedtime  chlorhexidine 4% Liquid 1 Application(s) Topical once  enalapril 10 milliGRAM(s) Oral daily  enoxaparin Injectable 80 milliGRAM(s) SubCutaneous every 12 hours  famotidine    Tablet 40 milliGRAM(s) Oral daily  magnesium sulfate  IVPB 2 Gram(s) IV Intermittent once  metoprolol succinate ER 50 milliGRAM(s) Oral daily    PRN Medications    Singles Doses Administered  (ADM OVERRIDE) 1 each &lt;see task&gt; GiveOnce  (ADM OVERRIDE) 4 each &lt;see task&gt; GiveOnce  (ADM OVERRIDE) 1 each &lt;see task&gt; GiveOnce  enoxaparin Injectable 80 milliGRAM(s) SubCutaneous every 12 hours

## 2024-05-12 NOTE — PROGRESS NOTE ADULT - ASSESSMENT
Patient is a 73-year-old male with history of CAD status post CABG in 2010, paroxysmal A. fib, intermittent heart block s/p PPM placed 4/5/2022 on Eliquis, history of DVTs on Eliquis, CVA in 2007  with no residual symptoms, hypertension, hyperlipidemia referred to the ED by his primary care physician for anemia    #CAD s/p CABG 2010  #Paroxysmal Afib on Eliquis  #Intermittent HB s/p PPM   - plan for cath  - cancelled due to anemia  - patient endorses hx of hematoma over left shoulder in Feb 2024  - no active bleeding   - GI recommends:   - May benefit from non urgent EGD and colonoscopy for workup of anemia   - Will need cardiology risk stratification. If patient is high risk, then would recommend cardiac cath prior to scope as patient is not bleeding and risks would outweigh benefits  - Trend Hb and keep active T/S. Transfuse as needed to keep Hb >7. Check iron studies  - In the setting of stable Hb since 04/2024 and absence of overt GI bleeding, recommend short acting AC from GI standpoint if benefits outweigh risks (last eliquis dose was on 05/07)  - Switch famotidine to protonix 40mg QD  - f/u with cardio  - f/u iron studies    #CKDIII- stable  -monitor renal function    #HLD  -c/w statin    #HTN  -c/w metoprolol    - DVT Prophylaxis: on lovenox  - GI Prophylaxis: protonix  - Diet: DASH/TLC  - Activity: as tolerated  - Code Status: Full Code

## 2024-05-12 NOTE — PROGRESS NOTE ADULT - SUBJECTIVE AND OBJECTIVE BOX
ARELI PITTMAN  73y Male    CHIEF COMPLAINT:    Patient is a 73y old  Male who presents with a chief complaint of elective cath (11 May 2024 12:07)      INTERVAL HPI/OVERNIGHT EVENTS:    Patient seen and examined. No cp. No palpitations.     ROS: All other systems are negative.    Vital Signs:    T(F): 97.4 (24 @ 04:45), Max: 98 (24 @ 19:30)  HR: 62 (24 @ 04:45) (62 - 88)  BP: 116/71 (24 @ 04:45) (114/53 - 128/77)  RR: 18 (24 @ 04:45) (18 - 18)  SpO2: 97% (24 @ 04:45) (97% - 97%)  I&O's Summary    11 May 2024 07:01  -  12 May 2024 07:00  --------------------------------------------------------  IN: 830 mL / OUT: 0 mL / NET: 830 mL      Daily     Daily Weight in k.1 (12 May 2024 04:45)  CAPILLARY BLOOD GLUCOSE          PHYSICAL EXAM:    GENERAL:  NAD  SKIN: No rashes or lesions  HENT: Atraumatic. Normocephalic. PERRL. Moist membranes.  NECK: Supple, No JVD. No lymphadenopathy.  PULMONARY: CTA B/L. No wheezing. No rales  CVS: Normal S1, S2. Rate and Rhythm are regular. No murmurs.  ABDOMEN/GI: Soft, Nontender, Nondistended; BS present  EXTREMITIES: Peripheral pulses intact. No edema B/L LE.  NEUROLOGIC:  No motor or sensory deficit.  PSYCH: Alert & oriented x 3    Consultant(s) Notes Reviewed:  [x ] YES  [ ] NO  Care Discussed with Consultants/Other Providers [ x] YES  [ ] NO    EKG reviewed  Telemetry reviewed    LABS:                        7.9    5.32  )-----------( 221      ( 12 May 2024 06:23 )             27.6   Hemoglobin: 7.9 g/dL (05-12 @ 06:23)  Hemoglobin: 8.0 g/dL ( @ 08:01)  Hemoglobin: 8.0 g/dL (05-10 @ 13:01)  Hemoglobin: 8.5 g/dL (05-10 @ 13:00)        140  |  109  |  23<H>  ----------------------------<  113<H>  4.2   |  24  |  1.0    Ca    8.5      12 May 2024 06:23  Mg     1.6         TPro  5.9<L>  /  Alb  3.1<L>  /  TBili  0.3  /  DBili  x   /  AST  58<H>  /  ALT  57<H>  /  AlkPhos  144<H>                RADIOLOGY & ADDITIONAL TESTS:      Imaging or report Personally Reviewed:  [ ] YES  [ ] NO    Medications:  Standing  aspirin  chewable 81 milliGRAM(s) Oral daily  atorvastatin 20 milliGRAM(s) Oral at bedtime  chlorhexidine 4% Liquid 1 Application(s) Topical once  enalapril 10 milliGRAM(s) Oral daily  famotidine    Tablet 40 milliGRAM(s) Oral daily  metoprolol succinate ER 50 milliGRAM(s) Oral daily    PRN Meds      Case discussed with resident    Care discussed with pt/family

## 2024-05-12 NOTE — PROGRESS NOTE ADULT - ASSESSMENT
73-year-old M with PMH of HTN, HLD, CAD s/p 2V CABG in 2010 in [facility] (LIMA-LAD, SVG-RPDA), with Dx cath in 09/2020 with patent graft sites, paroxysmal A.fib (on Eliquis - last dose on 5/7/24), high degree AV block s/p PPM 4/5/2022, h/o DVTs, CVAs in 2007 and 2019 Right MCA infarct with residual left sided weakness, h/o GIB in 06/2022 requiring blood transfusion, with EGD and colonoscopy done at the time showing non-bleeding AVMs and large internal hemorrhoids who presented to his cardiologist with c/o weakness and increased fatigue. Pt denies any rectal bleeding or melena.    TTE 4/24/24 revealed EF of 37%, mild MR.  Pt was also found to have runs of NSVT upon checking his device. Pt was admitted for LHC and w/u for anemia.     Microcytic Anemia  CAD s/p CABG  HTN / DL  PAF on Eliquis  High degree AV block s/p PPM  H/O CVA  H/O Chronic DVT  NSVT                 PLAN:    ·	Tele reviewed. Episode of NSVT  ·	EKG on admission:   ·	Old record reviewed. Hb on 2/3/24 was 12.6. On admission it is 8.5  ·	GI eval noted. As per GI H/H is stable recently. No active bleeding. No further inpatient w/u. Cardiac risk stratification if inpatient w/u needed  ·	Monitor H/H  ·	Iron studies. Check retic count.   ·	Cardiology eval noted. Possible cath in AM  ·	H/O PAF. Eliquis on hold. Give him Lovenox 80 mg sq x 1 dose and then hold it for possible cath in AM  ·	Iron studies noted. Ferritin is 12, serum iron is 17 and Ferritin is 12. Severe iron deficiency anemia.   ·	Monitor electrolytes. Replete Mg    Progress Note Handoff    Pending (specify):  Consults_________, Tests________, Test Results_______, Other__Possible cath in AM_______  Family discussion: Care d/w the daughter on phone and wife on bedside.   Disposition: Home___/SNF___/Other________/Unknown at this time________    Isac Brumfield MD  Spectra: [phone]

## 2024-05-12 NOTE — PROGRESS NOTE ADULT - ASSESSMENT
Assessment and Plan  Case of a 73 year old male patient known to have a history of HTN, DL, CAD, CVA, paroxysmal afib on eliquis, history of DVT, AV block s/p PPM, HFrEF,  and history of colon/jejunal AVMs who was referred to the hospital for cardiac cath in setting of reduction in EF, NSVT, and CAD. We are consulted for concern of acute on chronic anemia.      Acute on Chronic Microcytic Iron Deficiency Anemia   History of Non Bleeding AVMs in Colon at Hepatic Flexure s/p APC and Proximal Jejunum  No Evidence of Overt Gastrointestinal Bleeding  * History of GI bleeding (melena) in 06/2022  * EGD 06/10/2022 large HH, non erosive gastritis (bx no HP IM), normal D (bx -)  * Colonoscopy 06/10/2022 good prep, 2 small non bleeding AVMs along hepatic flexure; s/p APC; internal hemorrhoids  * VCE 09/07/2022: small non bleeding AVM along proximal jejunum    * Hemodynamically stable; brown stools on 05/11  * Baseline Hb 12.9 in 2019 -> 7.5-9 in 2022 -> 11.9/12.4 in 02/2024 -> Hb 8.2 on 04/24/2024  * Hb trend: Hb 8.5/8 on 05/10 -> Hb 8 on 05/11 -> Hb 7.9 on 05/12  * Iron studies suggestive of iron deficiency (05/11)  * Last eliquis dose was on 05/07    RECOMMENDATIONS  - May benefit from non urgent EGD and Colonoscopy for workup of anemia  - Recommended cardiology risk stratification on 05/10; If patient is high risk, then would recommend cardiac cath prior to scope as patient is not bleeding and risks would outweigh benefits  - Cardiology evaluation on 05/11 appreciated: tentatively scheduled for left heart cath on 05/13  - Will consider scheduling patient for EGD and Colonoscopy after cardiac cath pending clinical course  - Administer 1 unit of pRBC today. Trend Hb and keep active T/S. Transfuse as needed to keep Hb >8. Iron studies noted above  - Therapeutic lovenox was held by primary team after AM dose on 05/12 in anticipation for LHC on 05/13; decision to resume   - Switch famotidine to PO protonix 40mg QD  - Advance diet as tolerated if Hb stable and there are no signs of active bleeding; patient will need to be on clear liquids day before colonoscopy  - Monitor BM. Recommend bowel regimen to avoid constipation  - Avoid non essential NSAIDs  - Follow up with our [facility] located at [address] [place] [address]. Phone Number: [phone]        Thank you for your consult.  - Please note that plan was communicated with medical team.   - Please reach GI on [phone] during weekdays till 5pm.  - Please call the GI service line after 5pm on Weekdays and anytime on Weekends: [phone].      Evan Abarca MD  PGY - 4 Gastroenterology Fellow   [facility]

## 2024-05-13 LAB
ALBUMIN SERPL ELPH-MCNC: 3.4 G/DL — LOW (ref 3.5–5.2)
ALP SERPL-CCNC: 155 U/L — HIGH (ref 30–115)
ALT FLD-CCNC: 58 U/L — HIGH (ref 0–41)
ANION GAP SERPL CALC-SCNC: 9 MMOL/L — SIGNIFICANT CHANGE UP (ref 7–14)
AST SERPL-CCNC: 57 U/L — HIGH (ref 0–41)
BASOPHILS # BLD AUTO: 0.04 K/UL — SIGNIFICANT CHANGE UP (ref 0–0.2)
BASOPHILS NFR BLD AUTO: 1 % — SIGNIFICANT CHANGE UP (ref 0–1)
BILIRUB SERPL-MCNC: 0.3 MG/DL — SIGNIFICANT CHANGE UP (ref 0.2–1.2)
BUN SERPL-MCNC: 22 MG/DL — HIGH (ref 10–20)
CALCIUM SERPL-MCNC: 8.9 MG/DL — SIGNIFICANT CHANGE UP (ref 8.4–10.5)
CHLORIDE SERPL-SCNC: 106 MMOL/L — SIGNIFICANT CHANGE UP (ref 98–110)
CO2 SERPL-SCNC: 27 MMOL/L — SIGNIFICANT CHANGE UP (ref 17–32)
CREAT SERPL-MCNC: 1 MG/DL — SIGNIFICANT CHANGE UP (ref 0.7–1.5)
EGFR: 78 ML/MIN/1.73M2 — SIGNIFICANT CHANGE UP
EOSINOPHIL # BLD AUTO: 0.3 K/UL — SIGNIFICANT CHANGE UP (ref 0–0.7)
EOSINOPHIL NFR BLD AUTO: 7.1 % — SIGNIFICANT CHANGE UP (ref 0–8)
GLUCOSE SERPL-MCNC: 101 MG/DL — HIGH (ref 70–99)
HCT VFR BLD CALC: 28.4 % — LOW (ref 42–52)
HGB BLD-MCNC: 8.1 G/DL — LOW (ref 14–18)
IMM GRANULOCYTES NFR BLD AUTO: 0.2 % — SIGNIFICANT CHANGE UP (ref 0.1–0.3)
LYMPHOCYTES # BLD AUTO: 1.41 K/UL — SIGNIFICANT CHANGE UP (ref 1.2–3.4)
LYMPHOCYTES # BLD AUTO: 33.5 % — SIGNIFICANT CHANGE UP (ref 20.5–51.1)
MAGNESIUM SERPL-MCNC: 2 MG/DL — SIGNIFICANT CHANGE UP (ref 1.8–2.4)
MCHC RBC-ENTMCNC: 22.1 PG — LOW (ref 27–31)
MCHC RBC-ENTMCNC: 28.5 G/DL — LOW (ref 32–37)
MCV RBC AUTO: 77.4 FL — LOW (ref 80–94)
MONOCYTES # BLD AUTO: 0.73 K/UL — HIGH (ref 0.1–0.6)
MONOCYTES NFR BLD AUTO: 17.3 % — HIGH (ref 1.7–9.3)
NEUTROPHILS # BLD AUTO: 1.72 K/UL — SIGNIFICANT CHANGE UP (ref 1.4–6.5)
NEUTROPHILS NFR BLD AUTO: 40.9 % — LOW (ref 42.2–75.2)
NRBC # BLD: 0 /100 WBCS — SIGNIFICANT CHANGE UP (ref 0–0)
PLATELET # BLD AUTO: 230 K/UL — SIGNIFICANT CHANGE UP (ref 130–400)
PMV BLD: 11.7 FL — HIGH (ref 7.4–10.4)
POTASSIUM SERPL-MCNC: 4.3 MMOL/L — SIGNIFICANT CHANGE UP (ref 3.5–5)
POTASSIUM SERPL-SCNC: 4.3 MMOL/L — SIGNIFICANT CHANGE UP (ref 3.5–5)
PROT SERPL-MCNC: 6.3 G/DL — SIGNIFICANT CHANGE UP (ref 6–8)
RBC # BLD: 3.67 M/UL — LOW (ref 4.7–6.1)
RBC # FLD: 16 % — HIGH (ref 11.5–14.5)
SODIUM SERPL-SCNC: 142 MMOL/L — SIGNIFICANT CHANGE UP (ref 135–146)
WBC # BLD: 4.21 K/UL — LOW (ref 4.8–10.8)
WBC # FLD AUTO: 4.21 K/UL — LOW (ref 4.8–10.8)

## 2024-05-13 PROCEDURE — 99233 SBSQ HOSP IP/OBS HIGH 50: CPT

## 2024-05-13 PROCEDURE — 93010 ELECTROCARDIOGRAM REPORT: CPT

## 2024-05-13 RX ORDER — PANTOPRAZOLE SODIUM 20 MG/1
40 TABLET, DELAYED RELEASE ORAL
Refills: 0 | Status: DISCONTINUED | OUTPATIENT
Start: 2024-05-13 | End: 2024-05-14

## 2024-05-13 RX ORDER — SODIUM CHLORIDE 9 MG/ML
1000 INJECTION INTRAMUSCULAR; INTRAVENOUS; SUBCUTANEOUS
Refills: 0 | Status: DISCONTINUED | OUTPATIENT
Start: 2024-05-13 | End: 2024-05-14

## 2024-05-13 RX ADMIN — Medication 10 MILLIGRAM(S): at 05:28

## 2024-05-13 RX ADMIN — Medication 50 MILLIGRAM(S): at 05:28

## 2024-05-13 RX ADMIN — IRON SUCROSE 110 MILLIGRAM(S): 20 INJECTION, SOLUTION INTRAVENOUS at 11:34

## 2024-05-13 RX ADMIN — ATORVASTATIN CALCIUM 20 MILLIGRAM(S): 80 TABLET, FILM COATED ORAL at 21:04

## 2024-05-13 RX ADMIN — Medication 81 MILLIGRAM(S): at 11:33

## 2024-05-13 RX ADMIN — PANTOPRAZOLE SODIUM 40 MILLIGRAM(S): 20 TABLET, DELAYED RELEASE ORAL at 10:36

## 2024-05-13 NOTE — PROGRESS NOTE ADULT - ASSESSMENT
73-year-old M with PMH of HTN, HLD, CAD s/p 2V CABG in 2010 in [facility] (LIMA-LAD, SVG-RPDA), with Dx cath in 09/2020 with patent graft sites, paroxysmal A.fib (on Eliquis - last dose on 5/7/24), high degree AV block s/p PPM 4/5/2022, h/o DVTs, CVAs in 2007 and 2019 Right MCA infarct with residual left sided weakness, h/o GIB in 06/2022 requiring blood transfusion, with EGD and colonoscopy done at the time showing non-bleeding AVMs and large internal hemorrhoids who presented to his cardiologist with c/o weakness and increased fatigue. Pt denies any rectal bleeding or melena.    TTE 4/24/24 revealed EF of 37%, mild MR.  Pt was also found to have runs of NSVT upon checking his device. Pt was admitted for LHC and w/u for anemia.     Microcytic Anemia  CAD s/p CABG  HTN / DL  PAF on Eliquis  High degree AV block s/p PPM  H/O CVA  H/O Chronic DVT  NSVT                 PLAN:    ·	Tele reviewed. Episode of NSVT  ·	EKG on admission:   ·	Old record reviewed. Hb on 2/3/24 was 12.6. On admission it is 8.5  ·	GI eval f/u noted. Now planning to do EGD/Colonoscopy after the cath  ·	Cont Protonix 40 mg po daily  ·	Monitor H/H  ·	Scheduled for cath today  ·	Cont NPO   ·	H/O PAF. Con to hold Eliquis for now. Cath today  ·	Iron studies noted. Ferritin is 12, serum iron is 17 and Ferritin is 12. Severe iron deficiency anemia. Started him on Venofer 200 mg iv daily x 5 doses.   ·	Monitor electrolytes.     Progress Note Handoff    Pending (specify):  Consults_________, Tests________, Test Results_______, Other__Cath today._______  Family discussion: Care d/w the daughter on phone and wife on bedside.   Disposition: Home___/SNF___/Other________/Unknown at this time________    Isac Brumfield MD  Spectra: [phone]

## 2024-05-13 NOTE — CHART NOTE - NSCHARTNOTEFT_GEN_A_CORE
73-year-old M with PMHx of HTN, HLD, CAD s/p 2V CABG in 2010 in [facility] (LIMA-LAD, SVG-RPDA), with Dx cath in 09/2020 with patent graft sites, paroxysmal A.fib (on Eliquis - last dose on 5/7/24), high degree AV block s/p PPM 4/5/2022, h/o DVTs, CVAs in 2007 and 2019 Right MCA infarct with residual left sided weakness, h/o GIB in 06/2022 requiring blood transfusion, with EGD and colonoscopy done at the time showing non-bleeding AVMs and large internal hemorhoids  Pt was previously  evaluated for [facility] d/t  c/o weakness and increased fatigue but was cancelled for low H/H.  Pt was admitted and evaluated by GI but now requires [facility] prior to possible Endoscopy/colonoscopy.                                                 PREOPERATIVE DAY OF PROCEDURE EVALUATION:  I have personally seen and examined the patient.  I agree with the history and physical which I have reviewed and noted any changes below.  (Signed electronically by __________)  05-13-24 @ 16:14      RIGHT RADIAL ARTERY EVALUATION:  TONI TEST: [ ] Negative          [ x] Positive    Anti- Anginal medications:                        [ ] not used                       [x ] used:  [ x]CCB  [x] BB  [ ] Nitrate [ ] Ranexa                [ ] not used but strong indication not to use.        Bleeding score: 4.8%  IV fluid : Held d/t low EF

## 2024-05-13 NOTE — PROGRESS NOTE ADULT - SUBJECTIVE AND OBJECTIVE BOX
INTERVAL EVENTS:    Patient is a 73y old Male who presents with a chief complaint of elective cath (11 May 2024 12:07)    Primary diagnosis of Today is hospital day 3d  This morning patient was seen and examined at bedside  The following symptoms/complaints/issues (or lack there of) are of note since last evaluation, otherwise patient without complaints:      PHYSICAL EXAM:  GENERAL:  otherwise NAD, lying in bed comfortably  HEAD:   otherwise atraumatic, normocephalic  EYES:   otherwise EOMI, sclera clear  ENT:   otherwise moist mucous membranes  NECK:   otherwise supple, trachea midline  CHEST:  otherwise no chest wall deformities or tenderness  HEART:   otherwise regular rate and rhythm; normal +S1/S2, no appreciable murmurs, rubs, or gallops  LUNG:    otherwise unlabored respirations, clear to auscultation anteriorly bilaterally; no appreciable wheezing, rales, rhonchi  ABDOMEN:   otherwise +BS, soft, nondistended, nontender  EXTREMITIES:    otherwise no cyanosis, clubbing, edema  SKIN:    otherwise no appreciable rashes or lesions  NEUROLOGIC: A&Ox  otherwise CN II-XII grossly intact; moving all extremities; no appreciable focal deficits    PAST MEDICAL & SURGICAL HISTORY  CAD (coronary artery disease)  s/p stenting and CABG 10 years ago    HLD (hyperlipidemia)    Stroke  10 years ago as per MD (2006, 2009, 2019- HAS FORGETFULNESS AND MOOD CHANGES SINCE LAST STROKE)    DVT, lower extremity  January 2019    Afib    HTN (hypertension)    S/P CABG (coronary artery bypass graft)    History of hernia repair    History of surgery  THROMBECTOMY    Pacemaker      SOCIAL HISTORY:  Social History:    ALLERGIES:  No Known Allergies    MEDICATIONS:  STANDING MEDICATIONS  aspirin  chewable 81 milliGRAM(s) Oral daily  atorvastatin 20 milliGRAM(s) Oral at bedtime  chlorhexidine 4% Liquid 1 Application(s) Topical once  enalapril 10 milliGRAM(s) Oral daily  famotidine    Tablet 40 milliGRAM(s) Oral daily  iron sucrose IVPB 200 milliGRAM(s) IV Intermittent every 24 hours  metoprolol succinate ER 50 milliGRAM(s) Oral daily    PRN MEDICATIONS    VITALS:   T(F): 97.3  HR: 93  BP: 134/81  RR: 18  SpO2: 95%    LABS:                        7.9    5.32  )-----------( 221      ( 12 May 2024 06:23 )             27.6     05-12    140  |  109  |  23<H>  ----------------------------<  113<H>  4.2   |  24  |  1.0    Ca    8.5      12 May 2024 06:23  Mg     1.6     05-12    TPro  5.9<L>  /  Alb  3.1<L>  /  TBili  0.3  /  DBili  x   /  AST  58<H>  /  ALT  57<H>  /  AlkPhos  144<H>  05-12      Urinalysis Basic - ( 12 May 2024 06:23 )    Color: x / Appearance: x / SG: x / pH: x  Gluc: 113 mg/dL / Ketone: x  / Bili: x / Urobili: x   Blood: x / Protein: x / Nitrite: x   Leuk Esterase: x / RBC: x / WBC x   Sq Epi: x / Non Sq Epi: x / Bacteria: x

## 2024-05-13 NOTE — CHART NOTE - NSCHARTNOTEFT_GEN_A_CORE
2018  EMPLOYEE INFORMATION: EMPLOYER INFORMATION:   NAME: Merle VIDALES   : 1964    DATE OF INJURY/EVENT: 11/15/2016          Location: [facility]   Treating Provider: Dimitry Schaeffer MD,MPH  Time In:  3:10 PM Time Out:  3:33 PM      DIAGNOSIS:   1. CHI (closed head injury), subsequent encounter    2. Fall, subsequent encounter    3. Sprain of wrist, right, subsequent encounter    4. Lumbar spine strain, subsequent encounter    5. Contusion of buttock, subsequent encounter      STATUS: This injury is determined to be WORK RELATED.  RETURN TO WORK:   Ms. Roman May return to work without restrictions (except no work over 40 hours/week)  Diagnostic Testing:   CT HEAD BRAIN  XR SACRUM AND COCCYX 2 + VW  CT HEAD BRAIN  ANTICIPATED MAXIMUM MEDICAL IMPROVEMENT:  ?  TREATMENT PLAN:  EMG- mild right carpal tunnel  Heat   Medication as needed  Move around as much you can  Splint at night  See Dr. Dhaliwal for issues related to back and hip  Dr. Castanon to discuss surgery or another injection  FOLLOW-UP: Is not necessary at this time.  Should your condition worsen or return, please do not hesitate to call and/or return to this clinic.    Thank you for the privilege of providing medical care for this injury/condition.  If there are any questions, please call the clinic at Dept: [phone].    Electronically signed on 2018 at 3:32 PM by:   Dimitry Schaeffer MD,MPH   Medical Director  [facility]     ----- Message from Ame Hobson sent at 11/13/2020 11:13 AM CST -----  Contact: pt, [phone] (M)  Patient requests to speak with you to schedule his next procedure. Requests a call as soon as possible to avoid conflict with other appt times. Please advise.     PROCEDURE:   [X] [facility]      PHYSICIAN: Dr. Peters   FELLOW: Dr. Martinez    Pre-procedure Diagnosis: HFrEF    Consent:    [X] Patient      Anesthesia:   [X] Sedation   [X] Local     Access & Closure:   [X] 5 Fr R Femoral Artery -> Manual compression    IV Contrast: 100 mL      Intervention:     Implants:     AUC:      FINDINGS:     Coronary Dominance: Right    LM: Mild disease    LAD: 80% disease in mid segment. Competitive flow noted. Supplied by patent LIMA-LAD.  D1: Mild disease.     CX: Mild disease.   OM1: Mild disease.     RCA: 50% disease in mid segment.   RPDA: 99% ostial stenosis. Supplied by patent SVG-RPDA.    LIMA-LAD: Patent   SVG-RPDA: Patent     LVEDP: 25 mmHg     EF: 37 % (TTE)     AV gradient: No significant gradient      ESTIMATED BLOOD LOSS: < 10 mL      CONDITION:   [X] Good     SPECIMEN REMOVED: N/A     POST-OP DIAGNOSIS:    [X] Patent bypass grafts     PLAN OF CARE:   [X] Return to In-patient bed   [X] Medications:   - Continue ASA 81 mg PO QD  [X] IV Fluids: NS @ 100cc/h x 2 hours  [x] Hold manual pressure if signs of hematoma or bleeding over femoral access site.  [x] GI follow up PROCEDURE:   [X] [facility]      PHYSICIAN: Dr. Peters   FELLOW: Dr. Martinez    Pre-procedure Diagnosis: HFrEF    Consent:    [X] Patient      Anesthesia:   [X] Sedation   [X] Local     Access & Closure:   [X] 5 Fr R Femoral Artery -> Manual compression    IV Contrast: 100 mL      Intervention:     Implants:     AUC:      FINDINGS:     Coronary Dominance: Right    LM: Mild disease    LAD: 80% disease in mid segment. Competitive flow noted. Supplied by patent LIMA-LAD.  D1: Mild disease.     CX: Mild disease.   OM1: Mild disease.     RCA: 50% disease in mid segment.   RPDA: 99% ostial stenosis. Supplied by patent SVG-RPDA.    LIMA-LAD: Patent   SVG-RPDA: Patent     LVEDP: 25 mmHg     EF: 37 % (TTE)     AV gradient: No significant gradient      ESTIMATED BLOOD LOSS: < 10 mL      CONDITION:   [X] Good     SPECIMEN REMOVED: N/A     POST-OP DIAGNOSIS:    [X] Patent bypass grafts.     PLAN OF CARE:   [X] Return to In-patient bed   [X] Medications:   - Continue ASA 81 mg PO QD  [X] IV Fluids: NS @ 100cc/h x 2 hours  [x] Hold manual pressure if signs of hematoma or bleeding over femoral access site.  [x] GI follow up

## 2024-05-13 NOTE — PROGRESS NOTE ADULT - ASSESSMENT
Assessment and Plan  Case of a 73 year old male patient known to have a history of HTN, DL, CAD, CVA, paroxysmal afib on eliquis, history of DVT, AV block s/p PPM, HFrEF,  and history of colon/jejunal AVMs who was referred to the hospital for cardiac cath in setting of reduction in EF, NSVT, and CAD. We are consulted for concern of acute on chronic anemia.      Acute on Chronic Microcytic Iron Deficiency Anemia   History of Non Bleeding AVMs in Colon at Hepatic Flexure s/p APC and Proximal Jejunum  No Evidence of Overt Gastrointestinal Bleeding  * Hemodynamically stable; brown stools on 05/11  * Baseline Hb 12.9 in 2019 -> 7.5-9 in 2022 -> 11.9/12.4 in 02/2024 -> Hb 8.2 on 04/24/2024  * Hb trend: Hb 8.5/8 on 05/10 -> Hb 8 on 05/11 -> Hb 7.9 on 05/12  * Iron studies suggestive of iron deficiency (05/11)  * Last eliquis dose was on 05/07  * Prior EGD 06/10/2022 (for melena) large HH, non erosive gastritis (bx no HP IM), normal D (bx -)  * Prior Colonoscopy 06/10/2022 (for melena) good prep, 2 small non bleeding AVMs along hepatic flexure; s/p APC; internal hemorrhoids  * Prior VCE 09/07/2022: small non bleeding AVM along proximal jejunum    RECOMMENDATIONS  - May benefit from non urgent EGD and Colonoscopy for workup of anemia  - Cardiology evaluation on 05/11 appreciated: scheduled for left heart cath today  - Will consider scheduling patient for EGD and Colonoscopy after cardiac cath pending clinical course  - Trend Hb and keep active T/S. Transfuse as needed to keep Hb >8. Iron studies noted above  - Therapeutic lovenox was held by primary team after AM dose on 05/12 in anticipation for [facility] on today  - Switch famotidine to PO protonix 40mg QD  - Advance diet as tolerated if Hb stable and there are no signs of active bleeding; patient will need to be on clear liquids day before colonoscopy  - Monitor BM. Recommend bowel regimen to avoid constipation  - Avoid non essential NSAIDs  - Follow up with our [facility] located at [address] [place] [address]. Phone Number: [phone]        Thank you for your consult.  - Please note that plan was communicated with medical team.   - Please reach GI on [phone] during weekdays till 5pm.  - Please call the GI service line after 5pm on Weekdays and anytime on Weekends: [phone].      Evan Abarca MD  PGY - 4 Gastroenterology Fellow   [facility]

## 2024-05-13 NOTE — PROGRESS NOTE ADULT - SUBJECTIVE AND OBJECTIVE BOX
Gastroenterology Follow Up Note      Location: [facility] 3C 0[address] ([facility] 3C)  Patient Name: ARELI PITTMAN  Age: 73y  Gender: Male      Chief Complaint  Patient is a 73y old Male who presents with a chief complaint of [facility] evaluation (13 May 2024 08:25)  Primary diagnosis of Atherosclerosis of native coronary artery without angina pectoris      Reason for Consult  Anemia      Progress Note  This morning patient was seen and examined at bedside.    Today is hospital day 3d.  Patient is doing fine. No acute events overnight.   Patient's appetite is adequate, and he is tolerating diet and denies nausea or vomiting.   Patient denies any abdominal pain.  Last bowel movement was soft and brown on 05/11.        Vital Signs in the last 24 hours   Vitals Summary T(C): 36.3 (05-13-24 @ 05:00), Max: 36.4 (05-12-24 @ 20:00)  HR: 93 (05-13-24 @ 05:00) (60 - 93)  BP: 134/81 (05-13-24 @ 05:00) (106/62 - 134/81)  RR: 18 (05-13-24 @ 05:00) (18 - 18)  SpO2: 95% (05-13-24 @ 05:00) (95% - 95%)  Vent Data   Intake/ Output   05-12-24 @ 07:01  -  05-13-24 @ 07:00  --------------------------------------------------------  IN: 340 mL / OUT: 0 mL / NET: 340 mL        Physical Exam  * General Appearance: Alert, cooperative, interactive, oriented to time, place, and person, in no acute distress  * Lungs: Good bilateral air entry, normal breath sounds (Clear to auscultation bilaterally  * Heart: Regular Rate and Rhythm, normal S1 and S2  * Abdomen: Symmetric, non-distended, no scar, soft, non-tender, bowel sounds active all four quadrants  * Rectal: Brown stool, Normal tone, no palpable masses or tenderness      Investigations   Laboratory Workup      - CBC:                        7.9    5.32  )-----------( 221      ( 12 May 2024 06:23 )             27.6       - Hgb Trend:  7.9  05-12-24 @ 06:23  8.0  05-11-24 @ 08:01  8.0  05-10-24 @ 13:01  8.5  05-10-24 @ 13:00          - Chemistry:  05-12    140  |  109  |  23<H>  ----------------------------<  113<H>  4.2   |  24  |  1.0    Ca    8.5      12 May 2024 06:23  Mg     1.6     05-12    TPro  5.9<L>  /  Alb  3.1<L>  /  TBili  0.3  /  DBili  x   /  AST  58<H>  /  ALT  57<H>  /  AlkPhos  144<H>  05-12    Liver panel trend:  TBili 0.3   /   AST 58   /   ALT 57   /   AlkP 144   /   Tptn 5.9   /   Alb 3.1    /   DBili --      05-12  TBili 0.4   /   AST 73   /   ALT 69   /   AlkP 161   /   Tptn 6.4   /   Alb 3.5    /   DBili --      05-11        Microbiological Workup  Urinalysis Basic - ( 12 May 2024 06:23 )    Color: x / Appearance: x / SG: x / pH: x  Gluc: 113 mg/dL / Ketone: x  / Bili: x / Urobili: x   Blood: x / Protein: x / Nitrite: x   Leuk Esterase: x / RBC: x / WBC x   Sq Epi: x / Non Sq Epi: x / Bacteria: x            Current Medications  Standing Medications  aspirin  chewable 81 milliGRAM(s) Oral daily  atorvastatin 20 milliGRAM(s) Oral at bedtime  chlorhexidine 4% Liquid 1 Application(s) Topical once  enalapril 10 milliGRAM(s) Oral daily  famotidine    Tablet 40 milliGRAM(s) Oral daily  iron sucrose IVPB 200 milliGRAM(s) IV Intermittent every 24 hours  metoprolol succinate ER 50 milliGRAM(s) Oral daily    PRN Medications    Singles Doses Administered  (ADM OVERRIDE) 1 each &lt;see task&gt; GiveOnce  (ADM OVERRIDE) 4 each &lt;see task&gt; GiveOnce  (ADM OVERRIDE) 1 each &lt;see task&gt; GiveOnce  enoxaparin Injectable 80 milliGRAM(s) SubCutaneous every 12 hours  enoxaparin Injectable 80 milliGRAM(s) SubCutaneous once  magnesium sulfate  IVPB 2 Gram(s) IV Intermittent once

## 2024-05-13 NOTE — PROGRESS NOTE ADULT - SUBJECTIVE AND OBJECTIVE BOX
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 Pt denies any rectal bleeding or melena.    TTE 4/24/24 revealed EF of 37%, mild MR.  Pt was also found to have runs of NSVT upon checking his device. Pt was admitted for LHC and w/u for anemia.     No overnight events     No complaints, pt seen and evaluated with  via daughter,  remains NPO for cardiac cath today     Present Today:   - Bronson:  No [ x ], Yes [   ] : Indication:     - Type of IV Access:       .. CVC/Piccline:  No [ x ], Yes [   ] : Indication:       .. Midline: No [ x ], Yes [   ] : Indication:                                              OBJECTIVE  PAST MEDICAL & SURGICAL HISTORY  CAD (coronary artery disease)  s/p stenting and CABG 10 years ago    HLD (hyperlipidemia)    Stroke  10 years ago as per MD (2006, 2009, 2019- HAS FORGETFULNESS AND MOOD CHANGES SINCE LAST STROKE)    DVT, lower extremity  January 2019    Afib    HTN (hypertension)    S/P CABG (coronary artery bypass graft)    History of hernia repair    History of surgery  THROMBECTOMY    Pacemaker                                                ALLERGIES:  No Known Allergies                           HOME MEDICATIONS  Home Medications:  atorvastatin 20 mg oral tablet: 1 tab(s) orally once a day (at bedtime) (10 May 2024 13:14)  Eliquis 2.5 mg oral tablet: 1 tab(s) orally 2 times a day (10 May 2024 14:05)  enalapril 10 mg oral tablet: 1 tab(s) orally once a day (10 May 2024 13:14)  Ferrex 150 Plus oral capsule: 1 cap(s) orally once a day (10 May 2024 14:05)  metoprolol succinate 50 mg oral tablet, extended release: 1 tab(s) orally once a day (10 May 2024 13:14)  Pepcid 40 mg oral tablet: 1 tab(s) orally once a day (10 May 2024 14:05)                           MEDICATIONS:  STANDING MEDICATIONS  aspirin  chewable 81 milliGRAM(s) Oral daily  atorvastatin 20 milliGRAM(s) Oral at bedtime  chlorhexidine 4% Liquid 1 Application(s) Topical once  enalapril 10 milliGRAM(s) Oral daily  iron sucrose IVPB 200 milliGRAM(s) IV Intermittent every 24 hours  metoprolol succinate ER 50 milliGRAM(s) Oral daily  pantoprazole    Tablet 40 milliGRAM(s) Oral before breakfast    PRN MEDICATIONS                                            ------------------------------------------------------------  VITAL SIGNS: Last 24 Hours  T(C): 36.6 (13 May 2024 13:33), Max: 36.6 (13 May 2024 13:33)  T(F): 97.9 (13 May 2024 13:33), Max: 97.9 (13 May 2024 13:33)  HR: 66 (13 May 2024 13:33) (60 - 93)  BP: 101/63 (13 May 2024 13:33) (101/63 - 134/81)  BP(mean): 99 (13 May 2024 05:00) (76 - 99)  RR: 18 (13 May 2024 13:33) (18 - 18)  SpO2: 95% (13 May 2024 05:00) (95% - 95%)      05-12-24 @ 07:01  -  05-13-24 @ 07:00  --------------------------------------------------------  IN: 340 mL / OUT: 0 mL / NET: 340 mL                                               LABS:                        8.1    4.21  )-----------( 230      ( 13 May 2024 07:28 )             28.4     05-13    142  |  106  |  22<H>  ----------------------------<  101<H>  4.3   |  27  |  1.0    Ca    8.9      13 May 2024 07:28  Mg     2.0     05-13    TPro  6.3  /  Alb  3.4<L>  /  TBili  0.3  /  DBili  x   /  AST  57<H>  /  ALT  58<H>  /  AlkPhos  155<H>  05-13      Urinalysis Basic - ( 13 May 2024 07:28 )    Color: x / Appearance: x / SG: x / pH: x  Gluc: 101 mg/dL / Ketone: x  / Bili: x / Urobili: x   Blood: x / Protein: x / Nitrite: x   Leuk Esterase: x / RBC: x / WBC x   Sq Epi: x / Non Sq Epi: x / Bacteria: x    Echo: 37%, (was 60% 7/2022)           PHYSICAL EXAM:  GENERAL: NAD, lying in bed comfortably  HEAD:  Atraumatic, Normocephalic  EYES: EOMI, PERRLA, conjunctiva and sclera clear  ENT: Moist mucous membranes  NECK: Supple, No JVD  CHEST/LUNG: Clear to auscultation bilaterally; No rales, rhonchi, wheezing, or rubs. Unlabored respirations  HEART: Regular rate and rhythm; No murmurs, rubs, or gallops  ABDOMEN: BSx4; Soft, nontender, nondistended  EXTREMITIES:  2+ Peripheral Pulses, brisk capillary refill. No clubbing, cyanosis, or edema  NERVOUS SYSTEM:  A&Ox3, no focal deficits   SKIN: No rashes or lesions                                         ASSESSMENT & PLAN    Assessment and Recommendation:   · Assessment    72 YO M with PMHx of HTN, HLD, CAD s/p 2V CABG in 2010 in [facility] (LIMA-LAD, SVG-RPDA), with Dx cath in 09/2020 with patent graft, paroxysmal A.fib (CHADS VASC= 6, on Eliquis - last dose on 5/7/24), high degree AV block s/p PPM 4/5/2022, h/o DVTs, CVAs in 2007 and 2019 Right MCA infarct with residual left sided weakness, h/o GIB in 06/2022 requiring blood transfusion (EGD and colonoscopy done at the time showing non-bleeding AVMs and large internal hemorrhoids) who presented to his cardiologist with c/o weakness and increased fatigue and was found to have reduced EF. In light of pt's risk factors, known CAD, newly depressed EF, and runs of NSVT (on device interrogation), he was now referred for [facility]. Elective Cath was canceled on 5/10 due to low Hb of 8 and history of bleeding.    IMPRESSION  #Newly reduced EF  #Hx of CAD s/p CABG (LIMA-LAD, SVG-RPDA)  - Cath 9/2020: Left main: Mild disease. LAD: Moderate disease in mid-segment 70 % diffuse lesion. Mid and distal vessel supplied by a patent LIMA graft. RCA: 60% mid-segment stenosis. RPDA: 100% at the ostium. Supplied by a patent SVG.  #High degree AV block s/p PPM  #Afib on Eliquis  - CHADSVASc at least 6   - Eliquis - last dose on 5/7/24. currently on lovenox 80mg BID  - Toprol 50mg PO QD  #Hx of GI bleed in 2022  #Acute on Chronic Microcytic Anemia   - EGD and colonoscopy done at the time (2022) showing non-bleeding AVMs and large internal hemorrhoids  #HTN, HLD  #Hx of CVA with residual defects   #runs of NSVTs on pacemaker interrogation outpatient (longest run 24 beats)      PLAN  - NPO for Diagnostic Cardiac Cath today  - keep Hb > 8  - f/u with GI post cath to evaluate need for IP w/u

## 2024-05-13 NOTE — PROGRESS NOTE ADULT - SUBJECTIVE AND OBJECTIVE BOX
ARELI PITTMAN  73y Male    CHIEF COMPLAINT:    Patient is a 73y old  Male who presents with a chief complaint of [facility] evaluation (13 May 2024 08:25)      INTERVAL HPI/OVERNIGHT EVENTS:    Patient seen and examined. Denies any palpitations. No melena. Scheduled for cath today. Interviewed the pt with the help of  phone and his daughter.     ROS: All other systems are negative.    Vital Signs:    T(F): 97.3 (24 @ 05:00), Max: 97.5 (24 @ 20:00)  HR: 93 (24 @ 05:00) (60 - 93)  BP: 134/81 (24 @ 05:00) (106/62 - 134/81)  RR: 18 (24 @ 05:00) (18 - 18)  SpO2: 95% (24 @ 05:00) (95% - 95%)  I&O's Summary    12 May 2024 07:01  -  13 May 2024 07:00  --------------------------------------------------------  IN: 340 mL / OUT: 0 mL / NET: 340 mL      Daily     Daily Weight in k.2 (13 May 2024 05:00)  CAPILLARY BLOOD GLUCOSE          PHYSICAL EXAM:    GENERAL:  NAD  SKIN: No rashes or lesions  HENT: Atraumatic. Normocephalic. PERRL. Moist membranes.  NECK: Supple, No JVD. No lymphadenopathy.  PULMONARY: CTA B/L. No wheezing. No rales  CVS: Normal S1, S2. Rate and Rhythm are regular. No murmurs.  ABDOMEN/GI: Soft, Nontender, Nondistended; BS present  EXTREMITIES: Peripheral pulses intact. No edema B/L LE.  NEUROLOGIC:  No motor or sensory deficit.  PSYCH: Alert & oriented x 3    Consultant(s) Notes Reviewed:  [x ] YES  [ ] NO  Care Discussed with Consultants/Other Providers [ x] YES  [ ] NO    EKG reviewed  Telemetry reviewed    LABS:                        8.1    4.21  )-----------( 230      ( 13 May 2024 07:28 )             28.4   Hemoglobin: 8.1 g/dL ( @ 07:28)  Hemoglobin: 7.9 g/dL ( @ 06:23)  Hemoglobin: 8.0 g/dL ( @ 08:01)  Hemoglobin: 8.0 g/dL (05-10 @ 13:01)  Hemoglobin: 8.5 g/dL (05-10 @ 13:00)        142  |  106  |  22<H>  ----------------------------<  101<H>  4.3   |  27  |  1.0    Ca    8.9      13 May 2024 07:28  Mg     2.0         TPro  6.3  /  Alb  3.4<L>  /  TBili  0.3  /  DBili  x   /  AST  57<H>  /  ALT  58<H>  /  AlkPhos  155<H>                RADIOLOGY & ADDITIONAL TESTS:      Imaging or report Personally Reviewed:  [ ] YES  [ ] NO    Medications:  Standing  aspirin  chewable 81 milliGRAM(s) Oral daily  atorvastatin 20 milliGRAM(s) Oral at bedtime  chlorhexidine 4% Liquid 1 Application(s) Topical once  enalapril 10 milliGRAM(s) Oral daily  iron sucrose IVPB 200 milliGRAM(s) IV Intermittent every 24 hours  metoprolol succinate ER 50 milliGRAM(s) Oral daily  pantoprazole    Tablet 40 milliGRAM(s) Oral before breakfast    PRN Meds      Case discussed with resident    Care discussed with pt/family

## 2024-05-14 ENCOUNTER — TRANSCRIPTION ENCOUNTER (OUTPATIENT)
Age: 74
End: 2024-05-14

## 2024-05-14 VITALS
HEART RATE: 61 BPM | RESPIRATION RATE: 18 BRPM | DIASTOLIC BLOOD PRESSURE: 59 MMHG | SYSTOLIC BLOOD PRESSURE: 96 MMHG | TEMPERATURE: 98 F

## 2024-05-14 LAB
ALBUMIN SERPL ELPH-MCNC: 3.6 G/DL — SIGNIFICANT CHANGE UP (ref 3.5–5.2)
ALP SERPL-CCNC: 164 U/L — HIGH (ref 30–115)
ALT FLD-CCNC: 59 U/L — HIGH (ref 0–41)
ANION GAP SERPL CALC-SCNC: 8 MMOL/L — SIGNIFICANT CHANGE UP (ref 7–14)
AST SERPL-CCNC: 60 U/L — HIGH (ref 0–41)
BASOPHILS # BLD AUTO: 0.03 K/UL — SIGNIFICANT CHANGE UP (ref 0–0.2)
BASOPHILS NFR BLD AUTO: 0.5 % — SIGNIFICANT CHANGE UP (ref 0–1)
BILIRUB SERPL-MCNC: 0.3 MG/DL — SIGNIFICANT CHANGE UP (ref 0.2–1.2)
BUN SERPL-MCNC: 20 MG/DL — SIGNIFICANT CHANGE UP (ref 10–20)
CALCIUM SERPL-MCNC: 9.1 MG/DL — SIGNIFICANT CHANGE UP (ref 8.4–10.5)
CHLORIDE SERPL-SCNC: 105 MMOL/L — SIGNIFICANT CHANGE UP (ref 98–110)
CO2 SERPL-SCNC: 26 MMOL/L — SIGNIFICANT CHANGE UP (ref 17–32)
CREAT SERPL-MCNC: 1.2 MG/DL — SIGNIFICANT CHANGE UP (ref 0.7–1.5)
EGFR: 63 ML/MIN/1.73M2 — SIGNIFICANT CHANGE UP
EOSINOPHIL # BLD AUTO: 0.3 K/UL — SIGNIFICANT CHANGE UP (ref 0–0.7)
EOSINOPHIL NFR BLD AUTO: 5.2 % — SIGNIFICANT CHANGE UP (ref 0–8)
GLUCOSE SERPL-MCNC: 110 MG/DL — HIGH (ref 70–99)
HCT VFR BLD CALC: 29.9 % — LOW (ref 42–52)
HGB BLD-MCNC: 8.6 G/DL — LOW (ref 14–18)
IMM GRANULOCYTES NFR BLD AUTO: 0.2 % — SIGNIFICANT CHANGE UP (ref 0.1–0.3)
LYMPHOCYTES # BLD AUTO: 1.31 K/UL — SIGNIFICANT CHANGE UP (ref 1.2–3.4)
LYMPHOCYTES # BLD AUTO: 22.9 % — SIGNIFICANT CHANGE UP (ref 20.5–51.1)
MAGNESIUM SERPL-MCNC: 2 MG/DL — SIGNIFICANT CHANGE UP (ref 1.8–2.4)
MCHC RBC-ENTMCNC: 22.1 PG — LOW (ref 27–31)
MCHC RBC-ENTMCNC: 28.8 G/DL — LOW (ref 32–37)
MCV RBC AUTO: 76.7 FL — LOW (ref 80–94)
MONOCYTES # BLD AUTO: 1.05 K/UL — HIGH (ref 0.1–0.6)
MONOCYTES NFR BLD AUTO: 18.4 % — HIGH (ref 1.7–9.3)
NEUTROPHILS # BLD AUTO: 3.02 K/UL — SIGNIFICANT CHANGE UP (ref 1.4–6.5)
NEUTROPHILS NFR BLD AUTO: 52.8 % — SIGNIFICANT CHANGE UP (ref 42.2–75.2)
NRBC # BLD: 0 /100 WBCS — SIGNIFICANT CHANGE UP (ref 0–0)
PLATELET # BLD AUTO: 269 K/UL — SIGNIFICANT CHANGE UP (ref 130–400)
PMV BLD: 11.4 FL — HIGH (ref 7.4–10.4)
POTASSIUM SERPL-MCNC: 4.4 MMOL/L — SIGNIFICANT CHANGE UP (ref 3.5–5)
POTASSIUM SERPL-SCNC: 4.4 MMOL/L — SIGNIFICANT CHANGE UP (ref 3.5–5)
PROT SERPL-MCNC: 6.5 G/DL — SIGNIFICANT CHANGE UP (ref 6–8)
RBC # BLD: 3.9 M/UL — LOW (ref 4.7–6.1)
RBC # FLD: 16.4 % — HIGH (ref 11.5–14.5)
SODIUM SERPL-SCNC: 139 MMOL/L — SIGNIFICANT CHANGE UP (ref 135–146)
WBC # BLD: 5.72 K/UL — SIGNIFICANT CHANGE UP (ref 4.8–10.8)
WBC # FLD AUTO: 5.72 K/UL — SIGNIFICANT CHANGE UP (ref 4.8–10.8)

## 2024-05-14 PROCEDURE — 99233 SBSQ HOSP IP/OBS HIGH 50: CPT

## 2024-05-14 PROCEDURE — 99239 HOSP IP/OBS DSCHRG MGMT >30: CPT

## 2024-05-14 RX ORDER — FERROUS SULFATE 325(65) MG
5 TABLET ORAL
Qty: 64 | Refills: 0
Start: 2024-05-14 | End: 2024-06-12

## 2024-05-14 RX ORDER — PANTOPRAZOLE SODIUM 20 MG/1
1 TABLET, DELAYED RELEASE ORAL
Qty: 30 | Refills: 0
Start: 2024-05-14 | End: 2024-06-12

## 2024-05-14 RX ORDER — FERROUS SULFATE 325(65) MG
5 TABLET ORAL
Qty: 150 | Refills: 0
Start: 2024-05-14 | End: 2024-06-12

## 2024-05-14 RX ORDER — ASPIRIN/CALCIUM CARB/MAGNESIUM 324 MG
1 TABLET ORAL
Qty: 30 | Refills: 0
Start: 2024-05-14 | End: 2024-06-12

## 2024-05-14 RX ORDER — IRON BG/IRON PS CPLX/C/CA-THR 150MG-50MG
1 CAPSULE ORAL
Refills: 0 | DISCHARGE

## 2024-05-14 RX ORDER — FAMOTIDINE 10 MG/ML
1 INJECTION INTRAVENOUS
Refills: 0 | DISCHARGE

## 2024-05-14 RX ADMIN — IRON SUCROSE 110 MILLIGRAM(S): 20 INJECTION, SOLUTION INTRAVENOUS at 12:34

## 2024-05-14 RX ADMIN — Medication 81 MILLIGRAM(S): at 11:52

## 2024-05-14 RX ADMIN — Medication 10 MILLIGRAM(S): at 05:00

## 2024-05-14 RX ADMIN — PANTOPRAZOLE SODIUM 40 MILLIGRAM(S): 20 TABLET, DELAYED RELEASE ORAL at 05:01

## 2024-05-14 RX ADMIN — Medication 50 MILLIGRAM(S): at 05:01

## 2024-05-14 NOTE — PROGRESS NOTE ADULT - ASSESSMENT
ASSESSMENT   74 YO M with PMHx of HTN, HLD, CAD s/p 2V CABG in 2010 in [facility] (LIMA-LAD, SVG-RPDA), with Dx cath in 09/2020 with patent graft, paroxysmal A.fib (CHADS VASC= 6, on Eliquis - last dose on 5/7/24), high degree AV block s/p PPM 4/5/2022, h/o DVTs, CVAs in 2007 and 2019 Right MCA infarct with residual left sided weakness, h/o GIB in 06/2022 requiring blood transfusion (EGD and colonoscopy done at the time showing non-bleeding AVMs and large internal hemorrhoids) who presented to his cardiologist with c/o weakness and increased fatigue and was found to have reduced EF. In light of pt's risk factors, known CAD, newly depressed EF, and runs of NSVT (on device interrogation), he was referred for LHC. Cath revealed patent stents, no new disease was found, no new stents placed.     IMPRESSION  #Newly reduced EF (37% - 4/29/2024)  #Hx of CAD s/p CABG (LIMA-LAD, SVG-RPDA)  - Cath 5/12/2024: Left main: Mild disease. LAD: Moderate disease in mid-segment 80 % diffuse lesion. Mid and distal vessel supplied by a patent LIMA graft. RCA: 50% mid-segment stenosis. RPDA: 99% at the ostium. Supplied by a patent SVG. LIMA-LAD: Patent;SVG-RPDA: Patent   - TTE 4/29/2024 - LVEF -37%; G2DD;   #High degree AV block s/p PPM  #Afib on Eliquis  - CHADSVASc at least 6   - Eliquis - last dose on 5/7/24. currently lovenox 80mg BID is on HOLD  - Toprol 50mg PO QD  #Hx of GI bleed in 2022  #Acute on Chronic Microcytic Anemia   - EGD and colonoscopy done at the time showing non-bleeding AVMs and large internal hemorrhoids  #Preop risk stratification  - Patient is able to achieve more than 4 METS. No High-risk patient features including active MI, Unstable or severe angina, Decompensated HF, Severe valvular disease.   - Class IV risk --> >15% risk of perioperative MACE  #HTN, HLD  #Hx of CVA with residual defects   #runs of NSVTs on pacemaker interrogation outpatient (longest run 24 beats)      RECOMMENDATIONS  -Patient with HFrEF on repeat TTE April 2024 and PPM showing NSVT.   - c/w aspirin, statins, Lisinopril, metoprolol     - Restart AC for a.fib after GI clears     ASSESSMENT   74 YO M with PMHx of HTN, HLD, CAD s/p 2V CABG in 2010 in [facility] (LIMA-LAD, SVG-RPDA), with Dx cath in 09/2020 with patent graft, paroxysmal A.fib (CHADS VASC= 6, on Eliquis - last dose on 5/7/24), high degree AV block s/p PPM 4/5/2022, h/o DVTs, CVAs in 2007 and 2019 Right MCA infarct with residual left sided weakness, h/o GIB in 06/2022 requiring blood transfusion (EGD and colonoscopy done at the time showing non-bleeding AVMs and large internal hemorrhoids) who presented to his cardiologist with c/o weakness and increased fatigue and was found to have reduced EF. In light of pt's risk factors, known CAD, newly depressed EF, and runs of NSVT (on device interrogation), he was referred for LHC. Cath revealed patent stents, no new disease was found, no new stents placed.     IMPRESSION  #Newly reduced EF (37% - 4/29/2024)  #Hx of CAD s/p CABG (LIMA-LAD, SVG-RPDA)  - Cath 5/12/2024: Left main: Mild disease. LAD: Moderate disease in mid-segment 80 % diffuse lesion. Mid and distal vessel supplied by a patent LIMA graft. RCA: 50% mid-segment stenosis. RPDA: 99% at the ostium. Supplied by a patent SVG. LIMA-LAD: Patent;SVG-RPDA: Patent   - TTE 4/29/2024 - LVEF -37%; G2DD;   #High degree AV block s/p PPM  #Afib on Eliquis  - CHADSVASc at least 6   - Eliquis - last dose on 5/7/24. currently lovenox 80mg BID is on HOLD  - Toprol 50mg PO QD  #Hx of GI bleed in 2022  #Acute on Chronic Microcytic Anemia   - EGD and colonoscopy done at the time showing non-bleeding AVMs and large internal hemorrhoids  #Preop risk stratification  - Patient is able to achieve more than 4 METS. No High-risk patient features including active MI, Unstable or severe angina, Decompensated HF, Severe valvular disease.   - Class IV risk --> >15% risk of perioperative MACE  #HTN, HLD  #Hx of CVA with residual defects   #runs of NSVTs on pacemaker interrogation outpatient (longest run 24 beats)      RECOMMENDATIONS  - Continue statins, ACE and metoprolol     - If cleared by GI to get AC then resume Eliquis and STOP aspirin   - If he is not cleared to resume Eliquis then continue aspirin   - He will need very close follow up with both cardiology and EP in 1 week   - He will need repeat CBC in 1 week   - No further inpatient cardiac workup at this time   - HE knows to return to the ED with any worsening shortness of breath, LE Swelling and bleeding in the stool        ASSESSMENT   74 YO M with PMHx of HTN, HLD, CAD s/p 2V CABG in 2010 in [facility] (LIMA-LAD, SVG-RPDA), with Dx cath in 09/2020 with patent graft, paroxysmal A.fib (CHADS VASC= 6, on Eliquis - last dose on 5/7/24), high degree AV block s/p PPM 4/5/2022, h/o DVTs, CVAs in 2007 and 2019 Right MCA infarct with residual left sided weakness, h/o GIB in 06/2022 requiring blood transfusion (EGD and colonoscopy done at the time showing non-bleeding AVMs and large internal hemorrhoids) who presented to his cardiologist with c/o weakness and increased fatigue and was found to have reduced EF. In light of pt's risk factors, known CAD, newly depressed EF, and runs of NSVT (on device interrogation), he was referred for LHC. Cath revealed patent stents, no new disease was found, no new stents placed.     IMPRESSION  #Newly reduced EF (37% - 4/29/2024)  #Hx of CAD s/p CABG (LIMA-LAD, SVG-RPDA)  - Cath 5/12/2024: Left main: Mild disease. LAD: Moderate disease in mid-segment 80 % diffuse lesion. Mid and distal vessel supplied by a patent LIMA graft. RCA: 50% mid-segment stenosis. RPDA: 99% at the ostium. Supplied by a patent SVG. LIMA-LAD: Patent;SVG-RPDA: Patent   - TTE 4/29/2024 - LVEF -37%; G2DD;   #High degree AV block s/p PPM  #Afib on Eliquis  - CHADSVASc at least 6   - Eliquis - last dose on 5/7/24. currently lovenox 80mg BID is on HOLD  - Toprol 50mg PO QD  #Hx of GI bleed in 2022  #Acute on Chronic Microcytic Anemia   - EGD and colonoscopy done at the time showing non-bleeding AVMs and large internal hemorrhoids  #Preop risk stratification  - Patient is able to achieve more than 4 METS. No High-risk patient features including active MI, Unstable or severe angina, Decompensated HF, Severe valvular disease.   - Class IV risk --> >15% risk of perioperative MACE  #HTN, HLD  #Hx of CVA with residual defects   #runs of NSVTs on pacemaker interrogation outpatient (longest run 24 beats)      RECOMMENDATIONS  - Continue statins, ACE and metoprolol  - If cleared by GI to get AC then resume Eliquis and STOP aspirin   - If he is not cleared to resume Eliquis then continue aspirin   - He will need very close follow up with both cardiology and EP in 1 week and GI Follow up   - He will need repeat CBC in 1 week   - No further inpatient cardiac workup at this time   - HE knows to return to the ED with any worsening shortness of breath, LE Swelling and bleeding in the stool        ASSESSMENT   74 YO M with PMHx of HTN, HLD, CAD s/p 2V CABG in 2010 in [facility] (LIMA-LAD, SVG-RPDA), with Dx cath in 09/2020 with patent graft, paroxysmal A.fib (CHADS VASC= 6, on Eliquis - last dose on 5/7/24), high degree AV block s/p PPM 4/5/2022, h/o DVTs, CVAs in 2007 and 2019 Right MCA infarct with residual left sided weakness, h/o GIB in 06/2022 requiring blood transfusion (EGD and colonoscopy done at the time showing non-bleeding AVMs and large internal hemorrhoids) who presented to his cardiologist with c/o weakness and increased fatigue and was found to have reduced EF. In light of pt's risk factors, known CAD, newly depressed EF, and runs of NSVT (on device interrogation), he was referred for LHC. Cath revealed patent stents, no new disease was found, no new stents placed.     IMPRESSION  #Newly reduced EF (37% - 4/29/2024)  #Hx of CAD s/p CABG (LIMA-LAD, SVG-RPDA)  - Cath 5/12/2024: Left main: Mild disease. LAD: Moderate disease in mid-segment 80 % diffuse lesion. Mid and distal vessel supplied by a patent LIMA graft. RCA: 50% mid-segment stenosis. RPDA: 99% at the ostium. Supplied by a patent SVG. LIMA-LAD: Patent;SVG-RPDA: Patent   - TTE 4/29/2024 - LVEF -37%; G2DD;   #High degree AV block s/p PPM  #Afib on Eliquis  - CHADSVASc at least 6   - Eliquis - last dose on 5/7/24. currently lovenox 80mg BID is on HOLD  - Toprol 50mg PO QD  #Hx of GI bleed in 2022  #Acute on Chronic Microcytic Anemia   - EGD and colonoscopy done at the time showing non-bleeding AVMs and large internal hemorrhoids  #Preop risk stratification  - Patient is able to achieve more than 4 METS. No High-risk patient features including active MI, Unstable or severe angina, Decompensated HF, Severe valvular disease.   - Class   #HTN, HLD  #Hx of CVA with residual defects         RECOMMENDATIONS  - Continue statins, ACE and metoprolol  - If cleared by GI to get AC then resume Eliquis and STOP aspirin   - If he is not cleared to resume Eliquis then continue aspirin   - He will need very close follow up with both cardiology and EP in 1-2 week and GI Follow up   - He will need repeat CBC in 1 week   - No further inpatient cardiac workup at this time   - HE knows to return to the ED with any worsening shortness of breath, LE Swelling and bleeding in the stool

## 2024-05-14 NOTE — PROGRESS NOTE ADULT - ASSESSMENT
ASSESSMENT  74 YO M with PMHx of HTN, HLD, CAD s/p 2V CABG in 2010 in [facility] (LIMA-LAD, SVG-RPDA), with Dx cath in 09/2020 with patent graft, paroxysmal A.fib (CHADS VASC= 6, on Eliquis - last dose on 5/7/24), high degree AV block s/p PPM 4/5/2022, h/o DVTs, CVAs in 2007 and 2019 Right MCA infarct with residual left sided weakness, h/o GIB in 06/2022 requiring blood transfusion (EGD and colonoscopy done at the time showing non-bleeding AVMs and large internal hemorrhoids) who presented to his cardiologist with c/o weakness and increased fatigue and was found to have reduced EF. In light of pt's risk factors, known CAD, newly depressed EF, and runs of NSVT (on device interrogation), he was referred for LHC. Cath revealed patent stents, no new disease was found, no new stents placed.     IMPRESSION  #Newly reduced EF (37% - 4/29/2024)  #Hx of CAD s/p CABG (LIMA-LAD, SVG-RPDA)  - Cath 5/12/2024: Left main: Mild disease. LAD: Moderate disease in mid-segment 80 % diffuse lesion. Mid and distal vessel supplied by a patent LIMA graft. RCA: 50% mid-segment stenosis. RPDA: 99% at the ostium. Supplied by a patent SVG. LIMA-LAD: Patent;SVG-RPDA: Patent   - TTE 4/29/2024 - LVEF -37%; G2DD;   #High degree AV block s/p PPM  #Afib on Eliquis  - CHADSVASc at least 6   - Eliquis - last dose on 5/7/24. currently lovenox 80mg BID is on HOLD  - Toprol 50mg PO QD  #Hx of GI bleed in 2022  #Acute on Chronic Microcytic Anemia   - EGD and colonoscopy done at the time showing non-bleeding AVMs and large internal hemorrhoids  #Preop risk stratification  - Patient is able to achieve more than 4 METS. No High-risk patient features including active MI, Unstable or severe angina, Decompensated HF, Severe valvular disease.   - Class IV risk --> >15% risk of perioperative MACE  #HTN, HLD  #Hx of CVA with residual defects   #runs of NSVTs on pacemaker interrogation outpatient (longest run 24 beats)      RECOMMENDATIONS  - Moderate to High-risk patient for a Low-risk procedure  - Class IV risk --> >15% risk of perioperative MACE  - No further cardiac work-up is needed at the moment  - There are no current cardiac contraindications to prevent from proceeding with the scheduled procedure.  - c/w aspirin, statins, Lisinopril, metoprolol     - Restart AC for a.fib after GI clears    This consult serves only as a sarah-operative cardiac risk stratification and evaluation to predict 30-day cardiac complications, risks, and mortality. The decision to proceed with the surgery/procedure is made by the performing physician and the patient.

## 2024-05-14 NOTE — DISCHARGE NOTE NURSING/CASE MANAGEMENT/SOCIAL WORK - NSDCPEFALRISK_GEN_ALL_CORE
For information on Fall & Injury Prevention, visit: [email or web address] OR  [email or web address] OR  https://www.cdc.gov/steadi/patient.html

## 2024-05-14 NOTE — DISCHARGE NOTE PROVIDER - NSDCMRMEDTOKEN_GEN_ALL_CORE_FT
atorvastatin 20 mg oral tablet: 1 tab(s) orally once a day (at bedtime)  Eliquis 2.5 mg oral tablet: 1 tab(s) orally 2 times a day  enalapril 10 mg oral tablet: 1 tab(s) orally once a day  Ferrex 150 Plus oral capsule: 1 cap(s) orally once a day  metoprolol succinate 50 mg oral tablet, extended release: 1 tab(s) orally once a day  Pepcid 40 mg oral tablet: 1 tab(s) orally once a day   atorvastatin 20 mg oral tablet: 1 tab(s) orally once a day (at bedtime)  Eliquis 2.5 mg oral tablet: 1 tab(s) orally 2 times a day  enalapril 10 mg oral tablet: 1 tab(s) orally once a day  metoprolol succinate 50 mg oral tablet, extended release: 1 tab(s) orally once a day  Pepcid 40 mg oral tablet: 1 tab(s) orally once a day   atorvastatin 20 mg oral tablet: 1 tab(s) orally once a day (at bedtime)  Eliquis 2.5 mg oral tablet: 1 tab(s) orally 2 times a day  enalapril 10 mg oral tablet: 1 tab(s) orally once a day  ferrous sulfate 300 mg/5 mL (60 mg/5 mL elemental iron) oral liquid: 5 milliliter(s) orally once a day  metoprolol succinate 50 mg oral tablet, extended release: 1 tab(s) orally once a day   atorvastatin 20 mg oral tablet: 1 tab(s) orally once a day (at bedtime)  Eliquis 2.5 mg oral tablet: 1 tab(s) orally 2 times a day  enalapril 10 mg oral tablet: 1 tab(s) orally once a day  ferrous sulfate 300 mg/5 mL (60 mg/5 mL elemental iron) oral liquid: 5 milliliter(s) orally once a day  metoprolol succinate 50 mg oral tablet, extended release: 1 tab(s) orally once a day  pantoprazole 40 mg oral delayed release tablet: 1 tab(s) orally once a day (before a meal)

## 2024-05-14 NOTE — PROGRESS NOTE ADULT - ATTENDING COMMENTS
Agree with the above.  Patient will benefit from EGD and colonoscopy for workup of iron deficiency anemia however, he will need to be optimized from cardiac standpoint prior to any intervention.  He is planned for left heart cath.    We will follow after cardiac optimization and plan depending on clinical progression.    Rest of recommendations per the note above.
Agree with the above.  Patient will benefit from outpatient follow-up with primary GI for reevaluation of his NAIDA.    Rest of recommendations per the note above.
Patient seen and examined  Agree with assessment and plan as outlined above.

## 2024-05-14 NOTE — PROGRESS NOTE ADULT - ASSESSMENT
Assessment and Plan  Case of a 73 year old male patient known to have a history of HTN, DL, CAD, CVA, paroxysmal afib on eliquis, history of DVT, AV block s/p PPM, HFrEF,  and history of colon/jejunal AVMs who was referred to the hospital for cardiac cath in setting of reduction in EF, NSVT, and CAD. We are consulted for concern of acute on chronic anemia.      Acute on Chronic Microcytic Iron Deficiency Anemia   History of Non Bleeding AVMs in Colon at Hepatic Flexure s/p APC and Proximal Jejunum  No Evidence of Overt Gastrointestinal Bleeding  * Hemodynamically stable; brown stools on 05/11  * Baseline Hb 12.9 in 2019 -> 7.5-9 in 2022 -> 11.9/12.4 in 02/2024 -> Hb 8.2 on 04/24/2024  * Hb trend: Hb 8.5/8 on 05/10 -> Hb 8 on 05/11 -> Hb 7.9 on 05/12 -> Hb 8.1 on 05/13  * Iron studies suggestive of iron deficiency (05/11)  * Last eliquis dose was on 05/07  * Prior EGD 06/10/2022 (for melena) large HH, non erosive gastritis (bx no HP IM), normal D (bx -)  * Prior Colonoscopy 06/10/2022 (for melena) good prep, 2 small non bleeding AVMs along hepatic flexure; s/p APC; internal hemorrhoids  * Prior VCE 09/07/2022: small non bleeding AVM along proximal jejunum    RECOMMENDATIONS  - Would benefit from a non urgent EGD and Colonoscopy for workup of anemia. After discussing risks and benefits of procedure with patient at bedside and daughter Arnol () over the phone, decision was made to follow up as outpatient with gastroenterologist Dr Gould for EGD/colonoscopy as outpatient  - Cardiology evaluation appreciated: s/p cardiac cath on 05/13 revealing patent grafts  - Can continue Aspirin 81mg QD; Avoid non essential NSAIDs  - Can resume eliquis in absence of overt GI bleeding and in setting of stable Hb (if benefits outweigh risks)  - Trend Hb and keep active T/S. Transfuse as needed to keep Hb >8. Iron studies noted above  - Continue PO protonix 40mg QD  - Tolerating DASH diet  - Monitor BM. Recommend bowel regimen to avoid constipation  - Follow up with our [facility] located at [address] [place] [address]. Phone Number: [phone]        Thank you for your consult.  - Please note that plan was communicated with medical team.   - Please reach GI on [phone] during weekdays till 5pm.  - Please call the GI service line after 5pm on Weekdays and anytime on Weekends: [phone].      Evan Abarca MD  PGY - 4 Gastroenterology Fellow   [facility]

## 2024-05-14 NOTE — DISCHARGE NOTE PROVIDER - CARE PROVIDERS DIRECT ADDRESSES
,DirectAddress_Unknown,DirectAddress_Unknown,[email or web address] ,DirectAddress_Unknown,DirectAddress_Unknown,[email or web address] ,DirectAddress_Unknown,DirectAddress_Unknown,[email or web address]

## 2024-05-14 NOTE — PROGRESS NOTE ADULT - PROVIDER SPECIALTY LIST ADULT
Cardiology
Gastroenterology
Hospitalist
Internal Medicine
Cardiology
Gastroenterology
Gastroenterology
Hospitalist
Internal Medicine
Hospitalist
Internal Medicine

## 2024-05-14 NOTE — PROGRESS NOTE ADULT - SUBJECTIVE AND OBJECTIVE BOX
HPI    73-year-old M with PMHx of HTN, HLD, CAD s/p 2V CABG in 2010 in [facility] (LIMA-LAD, SVG-RPDA), with Dx cath in 09/2020 with patent graft sites, paroxysmal A.fib (on Eliquis - last dose on 5/7/24), high degree AV block s/p PPM 4/5/2022, h/o DVTs, CVAs in 2007 and 2019 Right MCA infarct with residual left sided weakness, h/o GIB in 06/2022 requiring blood transfusion, with EGD and colonoscopy done at the time showing non-bleeding AVMs and large internal hemorhoids, who presented to his cardiologist with c/o weakness and increased fatigue.  Denies any chest pain, SOB, dizziness, n/v, diaphoresis, orthopnea, PND, LE edema, palpitations, syncope.  TTE 4/24/24 revealed EF of 37%, mild MR.  Pt was also found to have runs of NSVT upon checking his device.   In light of pt's risk factors, known CAD, newly depressed EF, and runs of NSVT, he is now referred for [facility] with possible intervention if clinically indicated.   ****Off Note: Outpt labs noted to have Hb of 8.2 (4/24/24), repeat CBC drawn and pending, and then will decide if to proceed with cath at this time.      Grafts:  LIMA to LAD - patent  SVG to RPDA - patent      CARDIOLOGY CONSULT    PAST MEDICAL & SURGICAL HISTORY  CAD (coronary artery disease)  s/p stenting and CABG 10 years ago    HLD (hyperlipidemia)    Stroke  10 years ago as per MD (2006, 2009, 2019- HAS FORGETFULNESS AND MOOD CHANGES SINCE LAST STROKE)    DVT, lower extremity  January 2019    Afib    HTN (hypertension)    S/P CABG (coronary artery bypass graft)    History of hernia repair    History of surgery  THROMBECTOMY    Pacemaker        FAMILY HISTORY  FAMILY HISTORY:  FH: heart disease (Mother)        SOCIAL HISTORY  []smoker  []Alcohol  []Drug    ALLERGIES  No Known Allergies      MEDICATIONS:  MEDICATIONS  (STANDING):  aspirin  chewable 81 milliGRAM(s) Oral daily  atorvastatin 20 milliGRAM(s) Oral at bedtime  chlorhexidine 4% Liquid 1 Application(s) Topical once  enalapril 10 milliGRAM(s) Oral daily  iron sucrose IVPB 200 milliGRAM(s) IV Intermittent every 24 hours  metoprolol succinate ER 50 milliGRAM(s) Oral daily  pantoprazole    Tablet 40 milliGRAM(s) Oral before breakfast  sodium chloride 0.9%. 1000 milliLiter(s) (50 mL/Hr) IV Continuous <Continuous>    MEDICATIONS  (PRN):      HOME MEDICATIONS  Home Medications:  atorvastatin 20 mg oral tablet: 1 tab(s) orally once a day (at bedtime) (10 May 2024 13:14)  Eliquis 2.5 mg oral tablet: 1 tab(s) orally 2 times a day (10 May 2024 14:05)  enalapril 10 mg oral tablet: 1 tab(s) orally once a day (10 May 2024 13:14)  metoprolol succinate 50 mg oral tablet, extended release: 1 tab(s) orally once a day (10 May 2024 13:14)      VITALS   T(F): 98.2 (05-14 @ 13:37), Max: 98.6 (05-13 @ 15:38)  HR: 61 (05-14 @ 13:37) (60 - 93)  BP: 96/59 (05-14 @ 13:37) (96/59 - 134/81)  BP(mean): 85 (05-14 @ 04:48) (76 - 99)  RR: 18 (05-14 @ 13:37) (13 - 18)  SpO2: 97% (05-14 @ 04:48) (95% - 98%)    I&O's Summary    13 May 2024 07:01  -  14 May 2024 07:00  --------------------------------------------------------  IN: 970 mL / OUT: 550 mL / NET: 420 mL    14 May 2024 07:01  -  14 May 2024 14:53  --------------------------------------------------------  IN: 450 mL / OUT: 0 mL / NET: 450 mL        REVIEW OF SYSTEMS  CONSTITUTIONAL: No weakness, fevers or chills  EYES: No visual changes  ENT: No vertigo or throat pain   NECK: No pain or stiffness  RESPIRATORY: No cough, wheezing, hemoptysis; No shortness of breath  CARDIOVASCULAR: No chest pain or palpitations  GASTROINTESTINAL: No abdominal or epigastric pain. No nausea, vomiting, or hematemesis; No diarrhea or constipation. No melena or hematochezia.  GENITOURINARY: No dysuria, frequency or hematuria  NEUROLOGICAL: No numbness or weakness  SKIN: No itching, no rashes  MSK: No pain    PHYSICAL EXAM  NEURO: patient is awake , alert and oriented  GEN: Not in acute distress  NECK: no thyroid enlargement, no JVD  LUNGS: Clear to auscultation bilaterally   CARDIOVASCULAR: S1/S2 present, RRR , no murmurs or rubs, no carotid bruits,  + PP bilaterally  ABD: Soft, non-tender, non-distended, +BS  EXT: No RAASH  SKIN: Intact    LABS:                        8.6    5.72  )-----------( 269      ( 14 May 2024 11:29 )             29.9     05-14    139  |  105  |  20  ----------------------------<  110<H>  4.4   |  26  |  1.2    Ca    9.1      14 May 2024 11:29  Mg     2.0     05-14    TPro  6.5  /  Alb  3.6  /  TBili  0.3  /  DBili  x   /  AST  60<H>  /  ALT  59<H>  /  AlkPhos  164<H>  05-14        Troponin Trend:            RADIOLOGY  -CXR:  -TTE:  -CCTA:  -STRESS TEST:  -CATHETERIZATION:    ECG    TELEMETRY EVENTS  Occasional NSVT  HPI    73-year-old M with PMHx of HTN, HLD, CAD s/p 2V CABG in 2010 in [facility] (LIMA-LAD, SVG-RPDA), with Dx cath in 09/2020 with patent graft sites, paroxysmal A.fib (on Eliquis - last dose on 5/7/24), high degree AV block s/p PPM 4/5/2022, h/o DVTs, CVAs in 2007 and 2019 Right MCA infarct with residual left sided weakness, h/o GIB in 06/2022 requiring blood transfusion, with EGD and colonoscopy done at the time showing non-bleeding AVMs and large internal hemorhoids, who presented to his cardiologist with c/o weakness and increased fatigue.  Denies any chest pain, SOB, dizziness, n/v, diaphoresis, orthopnea, PND, LE edema, palpitations, syncope.  TTE 4/24/24 revealed EF of 37%, mild MR.  Pt was also found to have runs of NSVT upon checking his device.   In light of pt's risk factors, known CAD, newly depressed EF, and runs of NSVT, he is now referred for [facility] with possible intervention if clinically indicated.   ****Off Note: Outpt labs noted to have Hb of 8.2 (4/24/24), repeat CBC drawn and pending, and then will decide if to proceed with cath at this time.    CARDIOLOGY CONSULT  Pt is admitted for worsening functional capacity over the past 2-3 weeks. Pt's cath done on 5/12 had no new findings in native arteries and grafts. Cardiology is following up for medical optimization of worsening cardiomyopathy.     PAST MEDICAL & SURGICAL HISTORY  CAD (coronary artery disease)  s/p stenting and CABG 10 years ago  HLD (hyperlipidemia)  Stroke  10 years ago as per MD (2006, 2009, 2019- HAS FORGETFULNESS AND MOOD CHANGES SINCE LAST STROKE)  DVT, lower extremity  January 2019  Afib  HTN (hypertension)  S/P CABG (coronary artery bypass graft)  History of hernia repair  History of surgery  THROMBECTOMY  Pacemaker    FAMILY HISTORY  FAMILY HISTORY:  FH: heart disease (Mother)    SOCIAL HISTORY  Non-smoker    ALLERGIES  No Known Allergies      MEDICATIONS:  MEDICATIONS  (STANDING):  aspirin  chewable 81 milliGRAM(s) Oral daily  atorvastatin 20 milliGRAM(s) Oral at bedtime  chlorhexidine 4% Liquid 1 Application(s) Topical once  enalapril 10 milliGRAM(s) Oral daily  iron sucrose IVPB 200 milliGRAM(s) IV Intermittent every 24 hours  metoprolol succinate ER 50 milliGRAM(s) Oral daily  pantoprazole    Tablet 40 milliGRAM(s) Oral before breakfast  sodium chloride 0.9%. 1000 milliLiter(s) (50 mL/Hr) IV Continuous <Continuous>      HOME MEDICATIONS  Home Medications:  atorvastatin 20 mg oral tablet: 1 tab(s) orally once a day (at bedtime) (10 May 2024 13:14)  Eliquis 2.5 mg oral tablet: 1 tab(s) orally 2 times a day (10 May 2024 14:05)  enalapril 10 mg oral tablet: 1 tab(s) orally once a day (10 May 2024 13:14)  metoprolol succinate 50 mg oral tablet, extended release: 1 tab(s) orally once a day (10 May 2024 13:14)      VITALS   T(F): 98.2 (05-14 @ 13:37), Max: 98.6 (05-13 @ 15:38)  HR: 61 (05-14 @ 13:37) (60 - 93)  BP: 96/59 (05-14 @ 13:37) (96/59 - 134/81)  BP(mean): 85 (05-14 @ 04:48) (76 - 99)  RR: 18 (05-14 @ 13:37) (13 - 18)  SpO2: 97% (05-14 @ 04:48) (95% - 98%)    I&O's Summary    13 May 2024 07:01  -  14 May 2024 07:00  --------------------------------------------------------  IN: 970 mL / OUT: 550 mL / NET: 420 mL    14 May 2024 07:01  -  14 May 2024 14:53  --------------------------------------------------------  IN: 450 mL / OUT: 0 mL / NET: 450 mL        REVIEW OF SYSTEMS  CONSTITUTIONAL: No fevers or chills  EYES: No visual changes  ENT: No vertigo or throat pain   NECK: No pain or stiffness  RESPIRATORY: No cough, wheezing, hemoptysis; No shortness of breath  CARDIOVASCULAR: No chest pain or palpitations  GASTROINTESTINAL: No abdominal or epigastric pain. No nausea, vomiting, or hematemesis; No diarrhea or constipation.   GENITOURINARY: No dysuria, frequency or hematuria  NEUROLOGICAL: No numbness or weakness  SKIN: No itching, no rashes  MSK: No pain    PHYSICAL EXAM  NEURO: patient is awake , alert and oriented  GEN: Not in acute distress  NECK: no thyroid enlargement, no JVD  LUNGS: Clear to auscultation bilaterally   CARDIOVASCULAR: S1/S2 present, RRR , no murmurs or rubs, no carotid bruits,  + PP bilaterally  ABD: Soft, non-tender, non-distended, +BS  EXT: No ARASH  SKIN: Intact    LABS:                        8.6    5.72  )-----------( 269      ( 14 May 2024 11:29 )             29.9     05-14    139  |  105  |  20  ----------------------------<  110<H>  4.4   |  26  |  1.2    Ca    9.1      14 May 2024 11:29  Mg     2.0     05-14    TPro  6.5  /  Alb  3.6  /  TBili  0.3  /  DBili  x   /  AST  60<H>  /  ALT  59<H>  /  AlkPhos  164<H>  05-14      RADIOLOGY    -CXR: from Feb, Normal   -TTE 4/29/2024 - LVEF -37%; G2DD;     -STRESS TEST - 2020: Myocardial perfusion imaging reveals moderate in size severe in severity distal anteroapical, apical defect with minimal reversibility consistent with infarct in the distribution of the mid to distal left anterior descending coronary artery    -CATHETERIZATION: 5/12/2024: Left main: Mild disease. LAD: Moderate disease in mid-segment 80 % diffuse lesion. Mid and distal vessel supplied by a patent LIMA graft. RCA: 50% mid-segment stenosis. RPDA: 99% at the ostium. Supplied by a patent SVG. LIMA-LAD: Patent;SVG-RPDA: Patent     ECG - V-paced rhythm with PVCs, comparable to prior ECGs    TELEMETRY EVENTS  NSVTs noted

## 2024-05-14 NOTE — DISCHARGE NOTE NURSING/CASE MANAGEMENT/SOCIAL WORK - PATIENT PORTAL LINK FT
You can access the FollowMyHealth Patient Portal offered by [facility] by registering at the following website: [email or web address]. By joining [facility] FollowMyHealth portal, you will also be able to view your health information using other applications (apps) compatible with our system.

## 2024-05-14 NOTE — DISCHARGE NOTE PROVIDER - NPI NUMBER (FOR SYSADMIN USE ONLY) :
[[id number]],[[id number]],[[id number] [[id number]],[[id number] [[id number]]

## 2024-05-14 NOTE — DISCHARGE NOTE PROVIDER - NSDCCPCAREPLAN_GEN_ALL_CORE_FT
PRINCIPAL DISCHARGE DIAGNOSIS  Diagnosis: CAD (coronary artery disease)  Assessment and Plan of Treatment: You were evaluated for weakness and runs of NSVT in the setting of previous CABG so you underwent a left heart catheterization which showed similar findings to your past catheterization. You were started on aspirin 81mg daily. You also have a history of anemia and history of arteriovenous malformation so you were advised to get an EGD/colonoscopy to further evaluate. You were not interested in doing this inpatient, however please follow up outpatient for this procedure. Please follow up with cardiology and primary care provider for medication management. Please return to the ED if you feel unwell, or if you experience worsening symptoms, or any of the following symptoms but not limited to: chest pain, palpitations, shortness of breath, confusion, falls     PRINCIPAL DISCHARGE DIAGNOSIS  Diagnosis: CAD (coronary artery disease)  Assessment and Plan of Treatment: You were evaluated for weakness and runs of NSVT in the setting of previous CABG so you underwent a left heart catheterization which showed similar findings to your past catheterization.  YOU MUST FOLLOW WITH ELECTROPHYSIOLOGY DR. MANCINI to check for possible cardiomyopathy from the pacemaker. You are being discharged to continue your home eliquis, do NOT take aspirin 81mg daily. YOU MUST GET A CBC DONE NEXT WEEK TO CONFIRM THAT YOU ARE NOT HAVING ANY BLEEDING FROM THE ELIQUIS. You also have a history of anemia and history of arteriovenous malformation so you were advised to get an EGD/colonoscopy to further evaluate. You were not interested in doing this inpatient, however please follow up outpatient for this procedure. Please follow up with cardiology and primary care provider for medication management. Please return to the ED if you feel unwell, or if you experience worsening symptoms, or any of the following symptoms but not limited to: chest pain, palpitations, shortness of breath, bleeding, confusion, falls

## 2024-05-14 NOTE — DISCHARGE NOTE PROVIDER - HOSPITAL COURSE
HPI:    73-year-old M with PMHx of HTN, HLD, CAD s/p 2V CABG in 2010 in [facility] (LIMA-LAD, SVG-RPDA), with Dx cath in 09/2020 with patent graft sites, paroxysmal A.fib (on Eliquis - last dose on 5/7/24), high degree AV block s/p PPM 4/5/2022, h/o DVTs, CVAs in 2007 and 2019 Right MCA infarct with residual left sided weakness, h/o GIB in 06/2022 requiring blood transfusion, with EGD and colonoscopy done at the time showing non-bleeding AVMs and large internal hemorhoids, who presented to his cardiologist with c/o weakness and increased fatigue.  Denies any chest pain, SOB, dizziness, n/v, diaphoresis, orthopnea, PND, LE edema, palpitations, syncope.  TTE 4/24/24 revealed EF of 37%, mild MR.  Pt was also found to have runs of NSVT upon checking his device.   In light of pt's risk factors, known CAD, newly depressed EF, and runs of NSVT, he is now referred for [facility] with possible intervention if clinically indicated.     FLOOR COURSE    Underwent left heart cath on 05/13, treated chronic anemia with venofer for 3 days, patient to continue with iron supplements outpatient, considering history of gastritis and AVM patient to follow up for outpatient EGD and colonoscopy; no evidence of active bleeding inpatient.     Cath 5/13 results below:    Coronary Dominance: Right    LM: Mild disease    LAD: 80% disease in mid segment. Competitive flow noted. Supplied by patent LIMA-LAD.  D1: Mild disease.     CX: Mild disease.   OM1: Mild disease.     RCA: 50% disease in mid segment.   RPDA: 99% ostial stenosis. Supplied by patent SVG-RPDA.    LIMA-LAD: Patent   SVG-RPDA: Patent     LVEDP: 25 mmHg     EF: 37 % (TTE) HPI:    73-year-old M with PMHx of HTN, HLD, CAD s/p 2V CABG in 2010 in [facility] (LIMA-LAD, SVG-RPDA), with Dx cath in 09/2020 with patent graft sites, paroxysmal A.fib (on Eliquis - last dose on 5/7/24), high degree AV block s/p PPM 4/5/2022, h/o DVTs, CVAs in 2007 and 2019 Right MCA infarct with residual left sided weakness, h/o GIB in 06/2022 requiring blood transfusion, with EGD and colonoscopy done at the time showing non-bleeding AVMs and large internal hemorhoids, who presented to his cardiologist with c/o weakness and increased fatigue.  Denies any chest pain, SOB, dizziness, n/v, diaphoresis, orthopnea, PND, LE edema, palpitations, syncope.  TTE 4/24/24 revealed EF of 37%, mild MR.  Pt was also found to have runs of NSVT upon checking his device.   In light of pt's risk factors, known CAD, newly depressed EF, and runs of NSVT, he is now referred for [facility] with possible intervention if clinically indicated.     FLOOR COURSE    Underwent left heart cath on 05/13, treated chronic anemia with venofer for 3 days, patient to continue with iron supplements outpatient, considering history of gastritis and AVM patient to follow up for outpatient EGD and colonoscopy; no evidence of active bleeding inpatient.     **Patient restarting eliquis on discharge, will require CBC within 1 week to monitor hemoglobin**    Per cardiology, patient to be discharged on eliquis, without aspirin

## 2024-05-14 NOTE — PROGRESS NOTE ADULT - SUBJECTIVE AND OBJECTIVE BOX
Gastroenterology Follow Up Note      Location: [facility] 3C 014 B ([facility] 3C)  Patient Name: ARELI PITTMAN  Age: 73y  Gender: Male      Chief Complaint  Patient is a 73y old Male who presents with a chief complaint of [facility] evaluation (14 May 2024 08:33)  Primary diagnosis of Atherosclerosis of native coronary artery without angina pectoris      Reason for Consult  Anemia      Progress Note  This morning patient was seen and examined at bedside.    Today is hospital day 4d.  Patient is doing fine. No acute events overnight.   Patient's appetite is adequate, and he is tolerating diet and denies nausea or vomiting.   Patient denies any abdominal pain.  Last bowel movement was soft and brown on 05/11.      Vital Signs in the last 24 hours   Vitals Summary T(C): 36.8 (05-14-24 @ 13:37), Max: 37 (05-13-24 @ 15:38)  HR: 61 (05-14-24 @ 13:37) (60 - 88)  BP: 96/59 (05-14-24 @ 13:37) (96/59 - 131/63)  RR: 18 (05-14-24 @ 13:37) (13 - 18)  SpO2: 97% (05-14-24 @ 04:48) (96% - 98%)  Vent Data   Intake/ Output   05-13-24 @ 07:01  -  05-14-24 @ 07:00  --------------------------------------------------------  IN: 970 mL / OUT: 550 mL / NET: 420 mL    05-14-24 @ 07:01  -  05-14-24 @ 14:10  --------------------------------------------------------  IN: 210 mL / OUT: 0 mL / NET: 210 mL        Physical Exam  * General Appearance: Alert, cooperative, interactive, oriented to time, place, and person, in no acute distress  * Lungs: Good bilateral air entry, normal breath sounds (Clear to auscultation bilaterally  * Heart: Regular Rate and Rhythm, normal S1 and S2  * Abdomen: Symmetric, non-distended, no scar, soft, non-tender, bowel sounds active all four quadrants  * Rectal: Brown stool, Normal tone, no palpable masses or tenderness        Investigations   Laboratory Workup      - CBC:                        8.6    5.72  )-----------( 269      ( 14 May 2024 11:29 )             29.9       - Hgb Trend:  8.6  05-14-24 @ 11:29  8.1  05-13-24 @ 07:28  7.9  05-12-24 @ 06:23          - Chemistry:  05-14    139  |  105  |  20  ----------------------------<  110<H>  4.4   |  26  |  1.2    Ca    9.1      14 May 2024 11:29  Mg     2.0     05-14    TPro  6.5  /  Alb  3.6  /  TBili  0.3  /  DBili  x   /  AST  60<H>  /  ALT  59<H>  /  AlkPhos  164<H>  05-14    Liver panel trend:  TBili 0.3   /   AST 60   /   ALT 59   /   AlkP 164   /   Tptn 6.5   /   Alb 3.6    /   DBili --      05-14  TBili 0.3   /   AST 57   /   ALT 58   /   AlkP 155   /   Tptn 6.3   /   Alb 3.4    /   DBili --      05-13  TBili 0.3   /   AST 58   /   ALT 57   /   AlkP 144   /   Tptn 5.9   /   Alb 3.1    /   DBili --      05-12  TBili 0.4   /   AST 73   /   ALT 69   /   AlkP 161   /   Tptn 6.4   /   Alb 3.5    /   DBili --      05-11        Microbiological Workup  Urinalysis Basic - ( 14 May 2024 11:29 )    Color: x / Appearance: x / SG: x / pH: x  Gluc: 110 mg/dL / Ketone: x  / Bili: x / Urobili: x   Blood: x / Protein: x / Nitrite: x   Leuk Esterase: x / RBC: x / WBC x   Sq Epi: x / Non Sq Epi: x / Bacteria: x        Current Medications  Standing Medications  aspirin  chewable 81 milliGRAM(s) Oral daily  atorvastatin 20 milliGRAM(s) Oral at bedtime  chlorhexidine 4% Liquid 1 Application(s) Topical once  enalapril 10 milliGRAM(s) Oral daily  iron sucrose IVPB 200 milliGRAM(s) IV Intermittent every 24 hours  metoprolol succinate ER 50 milliGRAM(s) Oral daily  pantoprazole    Tablet 40 milliGRAM(s) Oral before breakfast  sodium chloride 0.9%. 1000 milliLiter(s) (50 mL/Hr) IV Continuous <Continuous>    PRN Medications    Singles Doses Administered  (ADM OVERRIDE) 1 each &lt;see task&gt; GiveOnce  (ADM OVERRIDE) 1 each &lt;see task&gt; GiveOnce  (ADM OVERRIDE) 1 each &lt;see task&gt; GiveOnce  (ADM OVERRIDE) 2 each &lt;see task&gt; GiveOnce  (ADM OVERRIDE) 4 each &lt;see task&gt; GiveOnce  (ADM OVERRIDE) 1 each &lt;see task&gt; GiveOnce  (ADM OVERRIDE) 1 each &lt;see task&gt; GiveOnce  enoxaparin Injectable 80 milliGRAM(s) SubCutaneous once  enoxaparin Injectable 80 milliGRAM(s) SubCutaneous every 12 hours  magnesium sulfate  IVPB 2 Gram(s) IV Intermittent once

## 2024-05-14 NOTE — PROGRESS NOTE ADULT - SUBJECTIVE AND OBJECTIVE BOX
HPI    73-year-old M with PMHx of HTN, HLD, CAD s/p 2V CABG in 2010 in [facility] (LIMA-LAD, SVG-RPDA), with Dx cath in 09/2020 with patent graft sites, paroxysmal A.fib (on Eliquis - last dose on 5/7/24), high degree AV block s/p PPM 4/5/2022, h/o DVTs, CVAs in 2007 and 2019 Right MCA infarct with residual left sided weakness, h/o GIB in 06/2022 requiring blood transfusion, with EGD and colonoscopy done at the time showing non-bleeding AVMs and large internal hemorhoids, who presented to his cardiologist with c/o weakness and increased fatigue.  Denies any chest pain, SOB, dizziness, n/v, diaphoresis, orthopnea, PND, LE edema, palpitations, syncope.  TTE 4/24/24 revealed EF of 37%, mild MR.  Pt was also found to have runs of NSVT upon checking his device.   In light of pt's risk factors, known CAD, newly depressed EF, and runs of NSVT, he is now referred for [facility] with possible intervention if clinically indicated.   ****Off Note: Outpt labs noted to have Hb of 8.2 (4/24/24),    CARDIOLOGY CONSULT  Pt's cath done on 5/12 had no new findings in native arteries and grafts. Cardiology is following the patient now for pre-op risk stratification for EGD/Colonoscopy for anemia work-up given his GI history.     PAST MEDICAL & SURGICAL HISTORY  CAD (coronary artery disease)  s/p stenting and CABG 10 years ago  HLD (hyperlipidemia)  Stroke  10 years ago as per MD (2006, 2009, 2019- HAS FORGETFULNESS AND MOOD CHANGES SINCE LAST STROKE)  DVT, lower extremity January 2019  Afib  HTN (hypertension)  S/P CABG (coronary artery bypass graft)  History of hernia repair  History of surgery  THROMBECTOMY  Pacemaker    FAMILY HISTORY  FAMILY HISTORY:  FH: heart disease (Mother)    SOCIAL HISTORY  []smoker  []Alcohol  []Drug    ALLERGIES  No Known Allergies      MEDICATIONS:  MEDICATIONS  (STANDING):  aspirin  chewable 81 milliGRAM(s) Oral daily  atorvastatin 20 milliGRAM(s) Oral at bedtime  chlorhexidine 4% Liquid 1 Application(s) Topical once  enalapril 10 milliGRAM(s) Oral daily  iron sucrose IVPB 200 milliGRAM(s) IV Intermittent every 24 hours  metoprolol succinate ER 50 milliGRAM(s) Oral daily  pantoprazole    Tablet 40 milliGRAM(s) Oral before breakfast  sodium chloride 0.9%. 1000 milliLiter(s) (50 mL/Hr) IV Continuous <Continuous>    MEDICATIONS  (PRN):      HOME MEDICATIONS  Home Medications:  atorvastatin 20 mg oral tablet: 1 tab(s) orally once a day (at bedtime) (10 May 2024 13:14)  Eliquis 2.5 mg oral tablet: 1 tab(s) orally 2 times a day (10 May 2024 14:05)  enalapril 10 mg oral tablet: 1 tab(s) orally once a day (10 May 2024 13:14)  Ferrex 150 Plus oral capsule: 1 cap(s) orally once a day (10 May 2024 14:05)  metoprolol succinate 50 mg oral tablet, extended release: 1 tab(s) orally once a day (10 May 2024 13:14)  Pepcid 40 mg oral tablet: 1 tab(s) orally once a day (10 May 2024 14:05)      VITALS   T(F): 97.6 (05-14 @ 04:48), Max: 98.6 (05-13 @ 15:38)  HR: 88 (05-14 @ 04:48) (60 - 93)  BP: 110/72 (05-14 @ 04:48) (101/63 - 134/81)  BP(mean): 85 (05-14 @ 04:48) (76 - 99)  RR: 18 (05-14 @ 04:48) (13 - 18)  SpO2: 97% (05-14 @ 04:48) (95% - 100%)    I&O's Summary    13 May 2024 07:01  -  14 May 2024 07:00  --------------------------------------------------------  IN: 970 mL / OUT: 550 mL / NET: 420 mL    14 May 2024 07:01  -  14 May 2024 10:22  --------------------------------------------------------  IN: 210 mL / OUT: 0 mL / NET: 210 mL        REVIEW OF SYSTEMS  CONSTITUTIONAL: No weakness, fevers or chills  EYES: No visual changes  ENT: No vertigo or throat pain   NECK: No pain or stiffness  RESPIRATORY: No cough, wheezing, hemoptysis; No shortness of breath  CARDIOVASCULAR: No chest pain or palpitations  GASTROINTESTINAL: No abdominal or epigastric pain. No nausea, vomiting, or hematemesis; No diarrhea or constipation. No melena or hematochezia.  GENITOURINARY: No dysuria, frequency or hematuria  NEUROLOGICAL: No numbness or weakness  SKIN: No itching, no rashes  MSK: No pain    PHYSICAL EXAM  NEURO: patient is awake , alert and oriented  GEN: Not in acute distress  NECK: no thyroid enlargement, no JVD  LUNGS: Clear to auscultation bilaterally   CARDIOVASCULAR: S1/S2 present, RRR , no murmurs or rubs, no carotid bruits,  + PP bilaterally  ABD: Soft, non-tender, non-distended, +BS  EXT: No ARASH  SKIN: Intact    LABS:                        8.1    4.21  )-----------( 230      ( 13 May 2024 07:28 )             28.4     05-13    142  |  106  |  22<H>  ----------------------------<  101<H>  4.3   |  27  |  1.0    Ca    8.9      13 May 2024 07:28  Mg     2.0     05-13    TPro  6.3  /  Alb  3.4<L>  /  TBili  0.3  /  DBili  x   /  AST  57<H>  /  ALT  58<H>  /  AlkPhos  155<H>  05-13      RADIOLOGY    -CXR: from Feb, Normal   -TTE 4/29/2024 - LVEF -37%; G2DD;     -STRESS TEST - 2020: Myocardial perfusion imaging reveals moderate in size severe in severity distal anteroapical, apical defect with minimal reversibility consistent with infarct in the distribution of the mid to distal left anterior descending coronary artery    -CATHETERIZATION: 5/12/2024: Left main: Mild disease. LAD: Moderate disease in mid-segment 80 % diffuse lesion. Mid and distal vessel supplied by a patent LIMA graft. RCA: 50% mid-segment stenosis. RPDA: 99% at the ostium. Supplied by a patent SVG. LIMA-LAD: Patent;SVG-RPDA: Patent     ECG  V-paced rhythm with PVCs, comparable to prior ECGs    TELEMETRY EVENTS  NSVTs noted     High-Risk Patient  [  ] Recent (<30 days) or active MI  [  ] Unstable or severe angina  [  ] Decompensated heart failure, or worsening or new-onset heart failure  [  ] Severe valvular disease  [  ] Significant arrhythmia (Tachy- or Bradyarrhythmia)    Revised Cardiac Risk Index (RCRI)  [ x ] History of ischemic heart disease   [ x ] History of congestive heart failure   [ x ] History of cerebrovascular disease (stroke or transient ischemic attack)   [  ] History of diabetes requiring preoperative insulin use   [  ] Chronic kidney disease (creatinine > 2 mg/dL)   [  ] Undergoing suprainguinal vascular, intraperitoneal, or intrathoracic surgery    - Overall this patient's RCRI score is 3   - Class IV risk: >15% risk of major cardiac event (defined as death, myocardial infarction, or cardiac arrest at 30 days after noncardiac surgery)      Patient-Based Characteristics (Functional Capacity)  [ x ] Patient is able to achieve more than 4 MET (walk 4 blocks, climb 2 flights of stairs, etc...)  [  ] Patient is NOT able to achieve more than 4 MET    Surgery/Procedure-Based Characteristics (Type of Surgery)  [ x ] Low-risk procedure (outpatient procedure, elective, endoscopy, etc...)  [  ] Elevated or Moderate-risk procedure (Inpatient)  [  ] High-risk procedure (urgent/emergent procedure, Intrathoracic, vascular, etc...)

## 2024-05-14 NOTE — PROGRESS NOTE ADULT - SUBJECTIVE AND OBJECTIVE BOX
INTERVAL EVENTS:    Patient is a 73y old Male who presents with a chief complaint of [facility] evaluation (13 May 2024 08:25)    Primary diagnosis of Today is hospital day 4d  This morning patient was seen and examined at bedside  The following symptoms/complaints/issues (or lack there of) are of note since last evaluation, otherwise patient without complaints:      PHYSICAL EXAM:  GENERAL:  otherwise NAD, lying in bed comfortably  HEAD:   otherwise atraumatic, normocephalic  EYES:   otherwise EOMI, sclera clear  ENT:   otherwise moist mucous membranes  NECK:   otherwise supple, trachea midline  CHEST:  otherwise no chest wall deformities or tenderness  HEART:   otherwise regular rate and rhythm; normal +S1/S2, no appreciable murmurs, rubs, or gallops  LUNG:    otherwise unlabored respirations, clear to auscultation anteriorly bilaterally; no appreciable wheezing, rales, rhonchi  ABDOMEN: R femoral access site nontender nonerythematous  otherwise +BS, soft, nondistended, nontender  EXTREMITIES:    otherwise no cyanosis, clubbing, edema  SKIN:    otherwise no appreciable rashes or lesions  NEUROLOGIC: A&Ox3  otherwise CN II-XII grossly intact; moving all extremities; no appreciable focal deficits    PAST MEDICAL & SURGICAL HISTORY  CAD (coronary artery disease)  s/p stenting and CABG 10 years ago    HLD (hyperlipidemia)    Stroke  10 years ago as per MD (2006, 2009, 2019- HAS FORGETFULNESS AND MOOD CHANGES SINCE LAST STROKE)    DVT, lower extremity  January 2019    Afib    HTN (hypertension)    S/P CABG (coronary artery bypass graft)    History of hernia repair    History of surgery  THROMBECTOMY    Pacemaker      SOCIAL HISTORY:  Social History:    ALLERGIES:  No Known Allergies    MEDICATIONS:  STANDING MEDICATIONS  aspirin  chewable 81 milliGRAM(s) Oral daily  atorvastatin 20 milliGRAM(s) Oral at bedtime  chlorhexidine 4% Liquid 1 Application(s) Topical once  enalapril 10 milliGRAM(s) Oral daily  iron sucrose IVPB 200 milliGRAM(s) IV Intermittent every 24 hours  metoprolol succinate ER 50 milliGRAM(s) Oral daily  pantoprazole    Tablet 40 milliGRAM(s) Oral before breakfast  sodium chloride 0.9%. 1000 milliLiter(s) IV Continuous <Continuous>    PRN MEDICATIONS    VITALS:   T(F): 97.6  HR: 88  BP: 110/72  RR: 18  SpO2: 97%    LABS:                        8.1    4.21  )-----------( 230      ( 13 May 2024 07:28 )             28.4     05-13    142  |  106  |  22<H>  ----------------------------<  101<H>  4.3   |  27  |  1.0    Ca    8.9      13 May 2024 07:28  Mg     2.0     05-13    TPro  6.3  /  Alb  3.4<L>  /  TBili  0.3  /  DBili  x   /  AST  57<H>  /  ALT  58<H>  /  AlkPhos  155<H>  05-13      Urinalysis Basic - ( 13 May 2024 07:28 )    Color: x / Appearance: x / SG: x / pH: x  Gluc: 101 mg/dL / Ketone: x  / Bili: x / Urobili: x   Blood: x / Protein: x / Nitrite: x   Leuk Esterase: x / RBC: x / WBC x   Sq Epi: x / Non Sq Epi: x / Bacteria: x               [EENT/Resp Symptoms] : EENT/RESPIRATORY SYMPTOMS [Nasal congestion] : nasal congestion [___ Week(s)] : [unfilled] week(s) [Constant] : constant [Active] : active [Sick Contacts: ___] : no sick contacts [Clear rhinorrhea] : clear rhinorrhea [At Night] : at night [In Morning] : in morning [Ibuprofen] : ibuprofen [Fever] : no fever [Change in sleep] : no change in sleep  [Eye Redness] : no eye redness [Eye Discharge] : no eye discharge [Eye Itching] : no eye itching [Ear Pain] : ear pain [Rhinorrhea] : rhinorrhea [Nasal Congestion] : nasal congestion [Sore Throat] : no sore throat [Palpitations] : no palpitations [Chest Pain] : no chest pain [Cough] : no cough [Wheezing] : no wheezing [Shortness of Breath] : no shortness of breath [Tachypnea] : no tachypnea [Decreased Appetite] : no decreased appetite [Posttussive emesis] : no posttussive emesis [Vomiting] : no vomiting [Diarrhea] : diarrhea [Decreased Urine Output] : no decreased urine output [Rash] : no rash [Max Temp: ____] : Max temperature: [unfilled] [FreeTextEntry9] : ear pain b/l, [Home] : at home, [unfilled] , at the time of the visit. [Medical Office: ([facility])___] : at ~his/her~ medical office located in [place] [Mother] : mother [Patient] : the patient [Self] : self [FreeTextEntry2] : Patient and Mother, Trinidad [FreeTextEntry4] : Tammi, Medical Assistant

## 2024-05-14 NOTE — DISCHARGE NOTE PROVIDER - NSDCFUSCHEDAPPT_GEN_ALL_CORE_FT
Ildefonso Moore  [facility] Physician [facility]  ELECTROPH [address]  Scheduled Appointment: 06/13/2024    [facility]  CARDIOLOGY [address]  Scheduled Appointment: 08/07/2024

## 2024-05-14 NOTE — DISCHARGE NOTE PROVIDER - CARE PROVIDER_API CALL
Bryanna Mccormick  Internal Medicine  [address] [place] [address]  Phone: [phone]  Fax: [phone]  Follow Up Time: 2 weeks    Hayden Becerra  Gastroenterology  [address] [place] [address]  Phone: [phone]  Fax: [phone]  Follow Up Time: 2 weeks    Jean Hsu  Cardiology  [address] [place] [address]  Phone: [phone]  Fax: [phone]  Follow Up Time: 2 weeks   Bryanna Mccormick  Internal Medicine  [address] [place] [address]  Phone: [phone]  Fax: [phone]  Follow Up Time: 1 week    Hayden Becerra  Gastroenterology  [address] [place] [address]  Phone: [phone]  Fax: [phone]  Follow Up Time: 2 weeks    Jean Hsu  Cardiology  [address] [place] [address]  Phone: [phone]  Fax: [phone]  Follow Up Time: 2 weeks   Bryanna Mccormick  Internal Medicine  [address] [place] [address]  Phone: [phone]  Fax: [phone]  Follow Up Time: 1 week    Hayden Becerra  Gastroenterology  [address] [place] [address]  Phone: [phone]  Fax: [phone]  Follow Up Time: 2 weeks    Jean Hsu  Cardiology  [address] [place] [address]  Phone: [phone]  Fax: [phone]  Follow Up Time: 2 weeks    Ildefonso Moore  Cardiac Electrophysiology  [address] [place] [address]  Phone: [phone]  Fax: [phone]  Follow Up Time: 1 week   Bryanna Mccormick  Internal Medicine  [address] [place] [address]  Phone: [phone]  Fax: [phone]  Follow Up Time: 1-3 days    Hayden Becerra  Gastroenterology  [address] [place] [address]  Phone: [phone]  Fax: [phone]  Follow Up Time: 2 weeks    Ildefonso Moore  Cardiac Electrophysiology  [address] [place] [address]  Phone: [phone]  Fax: [phone]  Follow Up Time: 1 week    Chandler Castellon  Cardiovascular Disease  [address] [place] [address], [facility] [address]  [place] [address]  Phone: [phone]  Fax: [phone]  Follow Up Time: 2 weeks

## 2024-05-14 NOTE — PROGRESS NOTE ADULT - ASSESSMENT
Patient is a 73-year-old male with history of CAD status post CABG in 2010, paroxysmal A. fib, intermittent heart block s/p PPM placed 4/5/2022 on Eliquis, history of DVTs on Eliquis, CVA in 2007  with no residual symptoms, hypertension, hyperlipidemia referred to the ED by his primary care physician for anemia    #CAD s/p CABG 2010  #Paroxysmal Afib on Eliquis  #Intermittent HB s/p PPM   PENDING  - Cardiology evaluation on 05/11 appreciated: tentatively scheduled for left heart cath on 05/13  - keep Hb > 8  PLAN  - start aspirin 81mg daily     #Acute on Chronic Microcytic Iron Deficiency Anemia   #History of Non Bleeding AVMs in Colon at Hepatic Flexure s/p APC and Proximal Jejunum  WORKUP  - EGD 06/10/2022 large HH, non erosive gastritis (bx no HP IM), normal D (bx -)  - Colonoscopy 06/10/2022 good prep, 2 small non bleeding AVMs along hepatic flexure; s/p APC; internal hemorrhoids  - VCE 09/07/2022: small non bleeding AVM along proximal jejunum  - patient endorses hx of hematoma over left shoulder in Feb 2024  PENDING  - Will consider scheduling patient for EGD and Colonoscopy after cardiac cath pending clinical course  - Advance diet as tolerated if Hb stable and there are no signs of active bleeding; patient will need to be on clear liquids day before colonoscopy  - Follow up with our [facility] located at [address] [place] [address]. Phone Number: [phone]    PLAN   - Switch famotidine to PO protonix 40mg QD    #CKDIII- stable  -monitor renal function    #HLD  -c/w statin    #HTN  -c/w metoprolol    - DVT Prophylaxis: on lovenox  - GI Prophylaxis: protonix  - Diet: DASH/TLC  - Activity: as tolerated  - Code Status: Full Code   Patient is a 73-year-old male with history of CAD status post CABG in 2010, paroxysmal A. fib, intermittent heart block s/p PPM placed 4/5/2022 on Eliquis, history of DVTs on Eliquis, CVA in 2007 with no residual symptoms, hypertension, hyperlipidemia referred to the ED by his primary care physician for anemia    #CAD s/p CABG 2010  #Paroxysmal Afib on Eliquis  #Intermittent HB s/p PPM   WORKUP  - left heart cath on 05/13  PLAN  - keep Hb > 8  - start aspirin 81mg daily     #Acute on Chronic Microcytic Iron Deficiency Anemia   #History of Non Bleeding AVMs in Colon at Hepatic Flexure s/p APC and Proximal Jejunum  WORKUP  - EGD 06/10/2022 large HH, non erosive gastritis (bx no HP IM), normal D (bx -)  - Colonoscopy 06/10/2022 good prep, 2 small non bleeding AVMs along hepatic flexure; s/p APC; internal hemorrhoids  - VCE 09/07/2022: small non bleeding AVM along proximal jejunum  - patient endorses hx of hematoma over left shoulder in Feb 2024  PLAN   - S/P venofer 5 days  - Switch famotidine to PO protonix 40mg QD  - Will consider scheduling patient for EGD and Colonoscopy after cardiac cath pending clinical course  - Advance diet as tolerated if Hb stable and there are no signs of active bleeding; patient will need to be on clear liquids day before colonoscopy  - Follow up with our [facility] located at [address] [place] [address]. Phone Number: [phone]      #CKDIII- stable  -monitor renal function    #HLD  -c/w statin    #HTN  -c/w metoprolol    - DVT Prophylaxis: on lovenox  - GI Prophylaxis: protonix  - Diet: DASH/TLC  - Activity: as tolerated  - Code Status: Full Code   Patient is a 73-year-old male with history of CAD status post CABG in 2010, paroxysmal A. fib, intermittent heart block s/p PPM placed 4/5/2022 on Eliquis, history of DVTs on Eliquis, CVA in 2007 with no residual symptoms, hypertension, hyperlipidemia referred to the ED by his primary care physician for anemia    #CAD s/p CABG 2010  #Paroxysmal Afib on Eliquis  #Intermittent HB s/p PPM   WORKUP  - left heart cath on 05/13  PLAN  - keep Hb > 8  - start aspirin 81mg daily     #Acute on Chronic Microcytic Iron Deficiency Anemia   #History of Non Bleeding AVMs in Colon at Hepatic Flexure s/p APC and Proximal Jejunum  WORKUP  - EGD 06/10/2022 large HH, non erosive gastritis (bx no HP IM), normal D (bx -)  - Colonoscopy 06/10/2022 good prep, 2 small non bleeding AVMs along hepatic flexure; s/p APC; internal hemorrhoids  - VCE 09/07/2022: small non bleeding AVM along proximal jejunum  - patient endorses hx of hematoma over left shoulder in Feb 2024  PLAN   - S/P venofer 5 days  - Switch famotidine to PO protonix 40mg QD  - Will consider scheduling patient for EGD and Colonoscopy after cardiac cath pending clinical course  - EGD/COLON pending risk strat from cardiology   - Advance diet as tolerated if Hb stable and there are no signs of active bleeding; patient will need to be on clear liquids day before colonoscopy  - Follow up with our [facility] located at [address] [place] [address]. Phone Number: [phone]      #CKDIII- stable  -monitor renal function    #HLD  -c/w statin    #HTN  -c/w metoprolol    - DVT Prophylaxis: on lovenox  - GI Prophylaxis: protonix  - Diet: DASH/TLC  - Activity: as tolerated  - Code Status: Full Code

## 2024-05-14 NOTE — DISCHARGE NOTE PROVIDER - PROVIDER TOKENS
PROVIDER:[TOKEN:[[id number]],FOLLOWUP:[2 weeks]],PROVIDER:[TOKEN:[[id number]],FOLLOWUP:[2 weeks]],PROVIDER:[TOKEN:[[id number]],FOLLOWUP:[2 weeks]] PROVIDER:[TOKEN:[[id number]],FOLLOWUP:[1 week]],PROVIDER:[TOKEN:[[id number]],FOLLOWUP:[2 weeks]],PROVIDER:[TOKEN:[[id number]],FOLLOWUP:[2 weeks]] PROVIDER:[TOKEN:[[id number]],FOLLOWUP:[1 week]],PROVIDER:[TOKEN:[[id number]],FOLLOWUP:[2 weeks]],PROVIDER:[TOKEN:[[id number]],FOLLOWUP:[2 weeks]],PROVIDER:[TOKEN:[[id number]],FOLLOWUP:[1 week]] PROVIDER:[TOKEN:[[id number]],FOLLOWUP:[1-3 days]],PROVIDER:[TOKEN:[[id number]],FOLLOWUP:[2 weeks]],PROVIDER:[TOKEN:[[id number]],FOLLOWUP:[1 week]],PROVIDER:[TOKEN:[[id number]],FOLLOWUP:[2 weeks]]

## 2024-05-23 NOTE — PRE-ANESTHESIA EVALUATION ADULT - NSATTENDATTESTRD_GEN_ALL_CORE
Ok thank you   The patient has been re-examined and I agree with the above assessment or I updated with my findings.

## 2024-05-24 DIAGNOSIS — I25.10 ATHEROSCLEROTIC HEART DISEASE OF NATIVE CORONARY ARTERY WITHOUT ANGINA PECTORIS: ICD-10-CM

## 2024-05-24 DIAGNOSIS — D50.9 IRON DEFICIENCY ANEMIA, UNSPECIFIED: ICD-10-CM

## 2024-05-24 DIAGNOSIS — Z95.1 PRESENCE OF AORTOCORONARY BYPASS GRAFT: ICD-10-CM

## 2024-05-24 DIAGNOSIS — Z98.890 OTHER SPECIFIED POSTPROCEDURAL STATES: ICD-10-CM

## 2024-05-24 DIAGNOSIS — I48.0 PAROXYSMAL ATRIAL FIBRILLATION: ICD-10-CM

## 2024-05-24 DIAGNOSIS — Z95.0 PRESENCE OF CARDIAC PACEMAKER: ICD-10-CM

## 2024-05-24 DIAGNOSIS — E78.5 HYPERLIPIDEMIA, UNSPECIFIED: ICD-10-CM

## 2024-05-24 DIAGNOSIS — Z95.5 PRESENCE OF CORONARY ANGIOPLASTY IMPLANT AND GRAFT: ICD-10-CM

## 2024-05-24 DIAGNOSIS — I42.8 OTHER CARDIOMYOPATHIES: ICD-10-CM

## 2024-05-24 DIAGNOSIS — I44.2 ATRIOVENTRICULAR BLOCK, COMPLETE: ICD-10-CM

## 2024-05-24 DIAGNOSIS — Z79.01 LONG TERM (CURRENT) USE OF ANTICOAGULANTS: ICD-10-CM

## 2024-05-24 DIAGNOSIS — K64.8 OTHER HEMORRHOIDS: ICD-10-CM

## 2024-05-24 DIAGNOSIS — I69.354 HEMIPLEGIA AND HEMIPARESIS FOLLOWING CEREBRAL INFARCTION AFFECTING LEFT NON-DOMINANT SIDE: ICD-10-CM

## 2024-05-24 DIAGNOSIS — E83.42 HYPOMAGNESEMIA: ICD-10-CM

## 2024-05-24 DIAGNOSIS — N18.30 CHRONIC KIDNEY DISEASE, STAGE 3 UNSPECIFIED: ICD-10-CM

## 2024-05-24 DIAGNOSIS — Z86.718 PERSONAL HISTORY OF OTHER VENOUS THROMBOSIS AND EMBOLISM: ICD-10-CM

## 2024-05-24 DIAGNOSIS — I13.0 HYPERTENSIVE HEART AND CHRONIC KIDNEY DISEASE WITH HEART FAILURE AND STAGE 1 THROUGH STAGE 4 CHRONIC KIDNEY DISEASE, OR UNSPECIFIED CHRONIC KIDNEY DISEASE: ICD-10-CM

## 2024-05-24 DIAGNOSIS — I47.20 VENTRICULAR TACHYCARDIA, UNSPECIFIED: ICD-10-CM

## 2024-05-24 DIAGNOSIS — I50.22 CHRONIC SYSTOLIC (CONGESTIVE) HEART FAILURE: ICD-10-CM

## 2024-06-03 NOTE — ADDENDUM
[FreeTextEntry1] : Dai ESPARZA assisted in documentation on 06/03/2024 acting as a scribe for Dr. Ildefonso Moore.

## 2024-06-03 NOTE — ASSESSMENT
[FreeTextEntry1] : ## High degree AV block s/p DC-PPM (MDT, 22, Dep) ## Severe 1st degree AV block-symptomatic ## paroxysmal atrial fibrillation ## Non-Sustained Ventricular Tachycardia ## Severe Anemia   - PPM interrogation shows normally functioning DC-PPM. Battery life ok. NSVT. - Severe anemia s/p blood transfusion. No more bleeding. EGD: erosive gastritis, colonoscopy: Non-bleeding AVM in colon, internal hemorrhoids. Awaiting Capsule endoscopy results. - On Eliquis. Compliant. No bleeding issues. Patient to contact us if there is any bleeding issues, interruption or any issues with OAC. Patient to go to ER/call 911 if any major bleeding. - GI/Hem-onc f-up - Patient plans to go for left heart cath soon. Remains anemic. - Patient is at increased risk of stroke based on CHADSVASC score. She is a good candidate for Amulet implant as an alternative to anticoagulation as had complications with anticoagulation.  I have discussed different treatment options with the patient including other anticoagulation medication. I have explained the risks and benefits of the procedure to the patient. I have explained to the patient the patient will have EDEN in approximately 45 days. Patient will remain on aspirin and Plavix for next 6 months, and then ASA only. There is approximately 1-2% chance of any major cardiovascular complication to occur. Complications include, but are not limited to infection, bleeding, and damage to the vessels, hole in the heart, stroke, death and heart attack. The patient understands the risk and would like to proceed with the procedure. Materials were provided to the patient. Patient indicated that all of his questions were answered to his satisfaction and verbalized understanding.  Referring PCP/Cardiologist Dr. Castellon  have discussed and recommended this to the patient/family and agrees with implant of watchman.  I recommend to continue anticoagulation for now. - Remote Monitoring - RTC after Amulet placement.

## 2024-06-03 NOTE — PROCEDURE
[No] : not [Complete Heart Block] : complete heart block [Pacemaker] : pacemaker [DDDR] : DDDR [Longevity: ___ months] : The estimated remaining battery life is [unfilled] months [Normal] : The battery status is normal. [Threshold Testing Performed] : Threshold testing was performed [Lead Imp:  ___ohms] : lead impedance was [unfilled] ohms [Sensing Amplitude ___mv] : sensing amplitude was [unfilled] mv [___V @] : [unfilled] V [___ ms] : [unfilled] ms [Programmed for Longevity] : output reprogrammed for improved battery longevity [Counters Reset] : the counters were reset [de-identified] : [employer] [de-identified] : KELLEY [de-identified] : [id number] [de-identified] : 4-5-22 [de-identified] :  [de-identified] : AP: 79.4% // : 98.0% AF burden: 16.8% Multiple AF episodes longest lasting 99 hrs with avg. v-rate = 61-125bpm.  Multiple NSVT episodes longest lasting 7 seconds with avg. v-rate = 158-176bpm. Transmitting on [employer].

## 2024-06-03 NOTE — HISTORY OF PRESENT ILLNESS
[FreeTextEntry1] : I had a pleasure of seeing Mr. PITTMAN for consultation for high degree AV block. He is accompanied by his wife. Speaks Telugu.   Mr. PITTMAN is a 71 year-year old male with history of CAD/CABG, CVA post CBAG, HTN, nSVT, paroxysmal atrial fibrillation is here for high degree AV block.  + High degree AV block on monitor0 with symptoms + Fatigue  06/22/22: Feels fine. s/p PPM-feels great after that. Had severe anemia- needed blood transfusion. Had colono + EGD without any significant findings. No dark stool or bleeding anymore.  07/27/22: Feels fine. No more bleeding. To see GI for capsule endoscopy in 1st week of Aug. Last Hgb 9.3 (2021- in 12s/13s).  He is accompanied by his daughter, who speaks English. 09/28/22: Feels fine. Undergoing GI work-up- awaiting results of capsule endoscopy 3/22/23: Feels fine. No further episodes of GIB. Hgb reported by patient in 12-13s. He is accompanied by his wife.  11/08/2023: Feels fine. Accompanied by his wife of 48 years. Mild BARNES. Overall, doing okay.   05/09/2024: Feels fine. Denies any complaints. Accompanied by his daughter and wife.   Denies chest pain, shortness of breath, palpitation, dizziness or LOC except noted above.  EKG (05/09/2024): SR- EKG (11/08/2023): AF- @ 65, ,   EKG (3/22/23): Sr- EKG (09/28/22): SR- EKG (07/27/22): SR- EKG (06/22/22): SR-@75 EKG (3/23/22): SR@ 53, , QRSd 86 TTE (03/22): Nl EF, Mod LAE Cardio: Dr. Quiles --> Dr. Castellon

## 2024-06-13 ENCOUNTER — APPOINTMENT (OUTPATIENT)
Dept: ELECTROPHYSIOLOGY | Facility: CLINIC | Age: 74
End: 2024-06-13
Payer: MEDICAID

## 2024-06-13 VITALS
DIASTOLIC BLOOD PRESSURE: 80 MMHG | BODY MASS INDEX: 27.63 KG/M2 | SYSTOLIC BLOOD PRESSURE: 114 MMHG | HEIGHT: 70 IN | TEMPERATURE: 97.1 F | HEART RATE: 82 BPM | WEIGHT: 193 LBS

## 2024-06-13 PROBLEM — I10 ESSENTIAL (PRIMARY) HYPERTENSION: Chronic | Status: ACTIVE | Noted: 2024-05-10

## 2024-06-13 PROCEDURE — G2211 COMPLEX E/M VISIT ADD ON: CPT | Mod: NC

## 2024-06-13 PROCEDURE — 93000 ELECTROCARDIOGRAM COMPLETE: CPT | Mod: 59

## 2024-06-13 PROCEDURE — 93280 PM DEVICE PROGR EVAL DUAL: CPT

## 2024-06-13 PROCEDURE — 99215 OFFICE O/P EST HI 40 MIN: CPT

## 2024-06-13 RX ORDER — FAMOTIDINE 40 MG/1
40 TABLET, FILM COATED ORAL
Refills: 0 | Status: ACTIVE | COMMUNITY

## 2024-06-13 RX ORDER — IRON POLYSACCHARIDE COMPLEX 150 MG
150 CAPSULE ORAL
Qty: 30 | Refills: 0 | Status: ACTIVE | COMMUNITY
Start: 2022-07-27

## 2024-06-13 RX ORDER — METOPROLOL SUCCINATE 50 MG/1
50 TABLET, EXTENDED RELEASE ORAL DAILY
Qty: 90 | Refills: 3 | Status: ACTIVE | COMMUNITY
Start: 2022-02-16

## 2024-06-13 RX ORDER — ATORVASTATIN CALCIUM 20 MG/1
20 TABLET, FILM COATED ORAL
Refills: 0 | Status: ACTIVE | COMMUNITY

## 2024-06-13 RX ORDER — DOCUSATE SODIUM 100 MG/1
100 CAPSULE, LIQUID FILLED ORAL
Refills: 0 | Status: ACTIVE | COMMUNITY
Start: 2022-06-10

## 2024-06-13 RX ORDER — FLUTICASONE PROPIONATE AND SALMETEROL 50; 250 UG/1; UG/1
250-50 POWDER RESPIRATORY (INHALATION) DAILY
Refills: 0 | Status: ACTIVE | COMMUNITY

## 2024-06-13 RX ORDER — APIXABAN 5 MG/1
5 TABLET, FILM COATED ORAL
Qty: 180 | Refills: 3 | Status: ACTIVE | COMMUNITY
Start: 2024-02-29

## 2024-06-13 NOTE — PROCEDURE
[Complete Heart Block] : complete heart block [Pacemaker] : pacemaker [DDDR] : DDDR [Voltage: ___ volts] : Voltage was [unfilled] volts [Magnet Rate: ___ Ppm] : magnet rate was [unfilled] Ppm [Sensing Amplitude ___mv] : sensing amplitude was [unfilled] mv [Lead Imp:  ___ohms] : lead impedance was [unfilled] ohms [___V @] : [unfilled] V [___ ms] : [unfilled] ms [None] : none [de-identified] : Medtronic [de-identified] : [id number] [de-identified] : [id number] [de-identified] : 04/05/2022 [de-identified] :  [de-identified] : 9.4 years of [de-identified] : AP-76.5% -96.7% 24 AT/AF episodes since 05/14/2014. 4 lasting >24 hours

## 2024-06-13 NOTE — HISTORY OF PRESENT ILLNESS
[FreeTextEntry1] : I had a pleasure of seeing Mr. PITTMAN for consultation for high degree AV block. He is accompanied by his wife. Speaks Croatian.   Mr. PITTMAN is a 71 year-year old male with history of CAD/CABG, CVA post CBAG, HTN, nSVT, paroxysmal atrial fibrillation is here for high degree AV block.  + High degree AV block on monitor0 with symptoms + Fatigue  06/22/22: Feels fine. s/p PPM-feels great after that. Had severe anemia- needed blood transfusion. Had colono + EGD without any significant findings. No dark stool or bleeding anymore.  07/27/22: Feels fine. No more bleeding. To see GI for capsule endoscopy in 1st week of Aug. Last Hgb 9.3 (2021- in 12s/13s).  He is accompanied by his daughter, who speaks English. 09/28/22: Feels fine. Undergoing GI work-up- awaiting results of capsule endoscopy 3/22/23: Feels fine. No further episodes of GIB. Hgb reported by patient in 12-13s. He is accompanied by his wife.  11/08/2023: Feels fine. Accompanied by his wife of 48 years. Mild BARNES. Overall, doing okay.   05/09/2024: Feels fine. Denies any complaints. Accompanied by his daughter and wife.  06/13/2024: Feels fine. Underwent cath. Hgb better. Hgb now 10. Accompanied by wife and daughter.   Denies chest pain, shortness of breath, palpitation, dizziness or LOC except noted above.  EKG (06/13/2024): SR-  EKG (05/09/2024): SR- Echo (04/2024): EF 37%  EKG (11/08/2023): AF- @ 65, ,   EKG (3/22/23): Sr- EKG (09/28/22): SR- EKG (07/27/22): SR- EKG (06/22/22): SR-@75  EKG (3/23/22): SR@ 53, , QRSd 86 TTE (03/22): Nl EF, Mod LAE Cardio: Dr. Quiles --> Dr. Castellon

## 2024-06-13 NOTE — ASSESSMENT
[FreeTextEntry1] : ## High degree AV block s/p DC-PPM (MDT, 22, Dep) ## Severe 1st degree AV block-symptomatic ## paroxysmal atrial fibrillation ## Non-Sustained Ventricular Tachycardia ## Severe Anemia ## NICM  - PPM interrogation shows normally functioning DC-PPM. Battery life ok. NSVT. - Severe anemia s/p blood transfusion. No more bleeding. EGD: erosive gastritis, colonoscopy: Non-bleeding AVM in colon, internal hemorrhoids. Awaiting Capsule endoscopy results. - On Eliquis. Compliant. No bleeding issues. Patient to contact us if there is any bleeding issues, interruption or any issues with OAC. Patient to go to ER/call 911 if any major bleeding. - GI/Hem-onc f-up - Patient is at increased risk of stroke based on CHADSVASC score. She is a good candidate for Amulet implant as an alternative to anticoagulation as had complications with anticoagulation.  I have discussed different treatment options with the patient including other anticoagulation medication. I have explained the risks and benefits of the procedure to the patient. I have explained to the patient the patient will have EDEN in approximately 45 days. Patient will remain on aspirin and Plavix for next 6 months, and then ASA only. There is approximately 1-2% chance of any major cardiovascular complication to occur. Complications include, but are not limited to infection, bleeding, and damage to the vessels, hole in the heart, stroke, death and heart attack. The patient understands the risk and would like to proceed with the procedure. Materials were provided to the patient. Patient indicated that all of his questions were answered to his satisfaction and verbalized understanding.  Referring PCP/Cardiologist Dr. Castellon  have discussed and recommended this to the patient/family and agrees with implant of watchman.  I recommend to continue anticoagulation for now. - I discussed the risks, benefits and alternatives for device implantation.   I reported a risk of major complications at 1% and minor complications at 3%. The details of the procedure and risks associated with undergoing the procedure were discussed in detail including but not limited to, death, myocardial ischemia, stroke, cardiac perforation, potential need for temporary pacemaker implantation, lung damage requiring chest tube (pneumothorax), blood clot, blood collection requiring blood transfusion or repeat surgery (hematoma), infection, or vascular injury.  We discussed that the coronary sinus anatomy is not suitable for a CS LV lead in about 10% of patients and if this is the case, we would not be able to place the lead. All questions were answered to satisfaction and the patient expressed verbal understanding of the procedure, the benefits, and risks. The patient agreed and signed consent for the procedure.  - Will do echo. If EF is less than 35%, will need ICD. If EF is more than 35%, will proceed with CRT-P.  - We also discussed the option of extraction with ICD implant at that time. At this time, daughter wants to think about it and will let us know. - They want to proceed in the Fall. They understand the risks of delaying the procedure, including worsening of systolic function and heart failure exacerbation. They expressed understanding and are agreeable.   - Discussed plan of care with Dr. Castellon.  - Remote Monitoring - RTC in 3 months.

## 2024-06-13 NOTE — ADDENDUM
[FreeTextEntry1] : Dai ESPARZA assisted in documentation on 06/13/2024 acting as a scribe for Dr. Ildefonso Moore.

## 2024-07-23 ENCOUNTER — APPOINTMENT (OUTPATIENT)
Dept: CARDIOLOGY | Facility: CLINIC | Age: 74
End: 2024-07-23
Payer: MEDICARE

## 2024-07-23 VITALS
SYSTOLIC BLOOD PRESSURE: 104 MMHG | DIASTOLIC BLOOD PRESSURE: 68 MMHG | BODY MASS INDEX: 27.63 KG/M2 | HEIGHT: 70 IN | WEIGHT: 193 LBS | HEART RATE: 68 BPM

## 2024-07-23 DIAGNOSIS — I25.10 ATHEROSCLEROTIC HEART DISEASE OF NATIVE CORONARY ARTERY W/OUT ANGINA PECTORIS: ICD-10-CM

## 2024-07-23 DIAGNOSIS — I10 ESSENTIAL (PRIMARY) HYPERTENSION: ICD-10-CM

## 2024-07-23 DIAGNOSIS — I48.0 PAROXYSMAL ATRIAL FIBRILLATION: ICD-10-CM

## 2024-07-23 DIAGNOSIS — I50.22 CHRONIC SYSTOLIC (CONGESTIVE) HEART FAILURE: ICD-10-CM

## 2024-07-23 PROCEDURE — 93000 ELECTROCARDIOGRAM COMPLETE: CPT

## 2024-07-23 PROCEDURE — 99204 OFFICE O/P NEW MOD 45 MIN: CPT

## 2024-07-23 RX ORDER — SACUBITRIL AND VALSARTAN 24; 26 MG/1; MG/1
24-26 TABLET, FILM COATED ORAL
Qty: 180 | Refills: 3 | Status: ACTIVE | COMMUNITY
Start: 2024-07-23 | End: 1900-01-01

## 2024-07-23 RX ORDER — ENALAPRIL MALEATE 10 MG/1
10 TABLET ORAL DAILY
Refills: 0 | Status: ACTIVE | COMMUNITY

## 2024-07-23 RX ORDER — DAPAGLIFLOZIN 10 MG/1
10 TABLET, FILM COATED ORAL DAILY
Qty: 90 | Refills: 3 | Status: ACTIVE | COMMUNITY
Start: 2024-07-23 | End: 1900-01-01

## 2024-07-23 RX ORDER — LISINOPRIL AND HYDROCHLOROTHIAZIDE TABLETS 10; 12.5 MG/1; MG/1
10-12.5 TABLET ORAL DAILY
Refills: 0 | Status: ACTIVE | COMMUNITY

## 2024-07-23 NOTE — HISTORY OF PRESENT ILLNESS
[FreeTextEntry1] : Mr. 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 and HTN presenting for follow-up.  Patient previously followed with Dr. Quiles and was last seen in office 8/2022. During that visit complained of left calf pain with walking 15 minutes. Arterial doppler was normal following that visit.  6/2022 was admitted for severe anemia; apixaban was discontinued, he was transfused, and colonoscopy revealed hemorrhoids and non-bleeding AVMs. Apixaban was decreased to 2.5mg BID and he began having black stools. Since that time has been following with GI and has not had recurrence of GI bleed.  First visit with me denied notable dyspnea (notes symptoms improved significantly with PPM insertion 2022), chest discomfort, or lower extremity complaints. TTE at that time showed a newly reduced EF to 37%. In the setting of runs of NSVT, he was referred for LHC.  LHC 5/13/24 showed no interval development of obstructive CAD, as below.  Today feels well. Denies active cardiopulmonary complaints or recent bleeding.  TTE 1/2022: normal systolic function, mod LA dilatation, mild RA dilatation, mild TR, mild AR EDEN 7/2022 - Normal systolic function, mild MR, mild TR, no atrial shunt TTE 4/2024: EF 37%, G2DD, mod LA dilatation, mild LA dilatation, mild MR, sclerotic Ao, PASP 36mmHg, AscAo 3.8cm  NM Stress 2020 - Moderate in size, severe distal anteroapical, apical defect with minimal reversibility, consistent with infarct in the distribution of the qno-ev-byem[facility] LAD.  LHC 5/2024: 80% mLAD supplied by a patent LIMA-LAD, 50% mRCA, 99% ostial RPDA supplied by a patent SVG-RPDA  LE Venous Duplex 3/2023 - L GSV thrombosis, R GSV s/p bypass harvesting LE Venous Duplex 6/2022 - No DVT LE arterial duplex 3/2023 - Normal study  Labs 2022 - Hgb 9.5, Plt 292 - Cr 1.3, K 4.2, normal transaminases - , LDL 76, HDL 54, TG 56, non-HDL 87 - hsCRP 3.65, A1c 6.7%  Meds: - Apixaban 5mg BID - Atorva 20mg - Metoprolol succinate 50mg - ? Enalapril 10mg or lisinopril-HCTZ 10-12.5mg - Pantoprazole - Methocarbamol - Famotidine - Advair - Ferrex 150mg  From Dr. Quiles' last note CVA - right middle cerebral artery thrombectomy - 7/2019 - stable HTN - controlled h/o CAD - s/p CABG around 2010 - stable CVA with parlysis - around 2007 - stable NSVT - stable Ischemic heart disease -stable Venous thrombosis - 1/2019 - stable Leg pain - stable Paroxysmal atrial fibrillaiton - CHADSVASC2 - 4 - stable PPM placed 4/5/2022 - stable Active GI bleed - recurrent

## 2024-07-23 NOTE — ASSESSMENT
[FreeTextEntry1] : Mr. 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 and HTN presenting to re-establish care with a primary cardiologist.  Impression: (1) HFrEF 2/2 mixed CM (ischemic, arrhythmia), euvolemic on exam (2) CAD s/p CABG 2010 in [place] (3) Paroxysmal AF on AC, CHADSVASc at least 6 (Age+2, CAD, CVA+2, HTN), stable Hgb since recent GIB (4) NSVT, device interrogation with several episodes, longest lasting 9 seconds (5) High degree AVB s/p PPM 2022 (6) CVA x2 (2007, 2019 s/p R MCA thrombectomy) (7) HTN (8) Anemia  Plan: - Continue apixaban for thromboembolic prophylaxis. Has a very high CHADSVASc score. - Ongoing EP follow up for device management. Plan for LAAC and consideration for CRT upgrade. - Repeat TTE - GDMT includes ACEi and BB at this point. Will continue metoprolol succinate, switch ACEi 36 hours after discontinuation to low dose Entresto. Start SGLT2i, Farxiga 10mg.  RTC after TTE.

## 2024-08-06 NOTE — ASU PREOP CHECKLIST - WAS PATIENT ON BETA BLOCKER?
Last seen 5/20/24 (by Karissa Herrera)- No future appt    Last refill 5/14/24 qty of 150g x 0    Please advise on refills, thanks     Yes

## 2024-08-19 ENCOUNTER — APPOINTMENT (OUTPATIENT)
Dept: CARDIOLOGY | Facility: CLINIC | Age: 74
End: 2024-08-19
Payer: MEDICARE

## 2024-08-19 PROCEDURE — 93306 TTE W/DOPPLER COMPLETE: CPT

## 2024-08-23 ENCOUNTER — APPOINTMENT (OUTPATIENT)
Dept: CARDIOLOGY | Facility: CLINIC | Age: 74
End: 2024-08-23

## 2024-08-26 ENCOUNTER — NON-APPOINTMENT (OUTPATIENT)
Age: 74
End: 2024-08-26

## 2024-09-16 ENCOUNTER — APPOINTMENT (OUTPATIENT)
Dept: CARDIOLOGY | Facility: CLINIC | Age: 74
End: 2024-09-16
Payer: MEDICARE

## 2024-09-16 VITALS
DIASTOLIC BLOOD PRESSURE: 67 MMHG | HEART RATE: 83 BPM | WEIGHT: 190 LBS | SYSTOLIC BLOOD PRESSURE: 102 MMHG | HEIGHT: 70 IN | BODY MASS INDEX: 27.2 KG/M2

## 2024-09-16 DIAGNOSIS — I25.10 ATHEROSCLEROTIC HEART DISEASE OF NATIVE CORONARY ARTERY W/OUT ANGINA PECTORIS: ICD-10-CM

## 2024-09-16 DIAGNOSIS — I10 ESSENTIAL (PRIMARY) HYPERTENSION: ICD-10-CM

## 2024-09-16 DIAGNOSIS — I50.22 CHRONIC SYSTOLIC (CONGESTIVE) HEART FAILURE: ICD-10-CM

## 2024-09-16 DIAGNOSIS — Z00.00 ENCOUNTER FOR GENERAL ADULT MEDICAL EXAMINATION W/OUT ABNORMAL FINDINGS: ICD-10-CM

## 2024-09-16 PROCEDURE — 99214 OFFICE O/P EST MOD 30 MIN: CPT | Mod: 25

## 2024-09-16 PROCEDURE — 93000 ELECTROCARDIOGRAM COMPLETE: CPT

## 2024-09-16 RX ORDER — CIPROFLOXACIN HYDROCHLORIDE 500 MG/1
500 TABLET, FILM COATED ORAL
Qty: 10 | Refills: 0 | Status: ACTIVE | COMMUNITY
Start: 2024-09-13

## 2024-09-16 NOTE — HISTORY OF PRESENT ILLNESS
[FreeTextEntry1] : Mr. 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 and HTN presenting for follow-up.  Patient previously followed with Dr. Qulies and was last seen in office 8/2022. During that visit complained of left calf pain with walking 15 minutes. Arterial doppler was normal following that visit.  6/2022 was admitted for severe anemia; apixaban was discontinued, he was transfused, and colonoscopy revealed hemorrhoids and non-bleeding AVMs. Apixaban was decreased to 2.5mg BID and he began having black stools. Since that time has been following with GI and has not had recurrence of GI bleed.  First visit with me denied notable dyspnea (notes symptoms improved significantly with PPM insertion 2022), chest discomfort, or lower extremity complaints. TTE at that time showed a newly reduced EF to 37%. In the setting of runs of NSVT, he was referred for LHC.  He was seen by EP and after TTE was scheduled for CRT-P and possible LAAC.  LHC 5/13/24 showed no interval development of obstructive CAD, as below.  Today feels well. Denies active cardiopulmonary complaints or recent bleeding.  TTE 1/2022: normal systolic function, mod LA dilatation, mild RA dilatation, mild TR, mild NV EDEN 7/2022 - Normal systolic function, mild MR, mild TR, no atrial shunt TTE 4/2024: EF 37%, G2DD, mod LA dilatation, mild LA dilatation, mild MR, sclerotic Ao, PASP 36mmHg, AscAo 3.8cm TTE 8/2024: EF 39%, borderline RV enlargement, severe LA dilatation, JOSE  NM Stress 2020 - Moderate in size, severe distal anteroapical, apical defect with minimal reversibility, consistent with infarct in the distribution of the [facility] LAD.  LHC 5/2024: 80% mLAD supplied by a patent LIMA-LAD, 50% mRCA, 99% ostial RPDA supplied by a patent SVG-RPDA  LE Venous Duplex 3/2023 - L GSV thrombosis, R GSV s/p bypass harvesting LE Venous Duplex 6/2022 - No DVT LE arterial duplex 3/2023 - Normal study  Labs  - Hgb 9.5, Plt 292 - Cr 1.0, K 4.3, AST 42, ALT 45 - , LDL 93, HDL 47, TG 70, non-HDL 87 - hsCRP 3.78, A1c 7.2%, pBNP 644 (prior 389 in 2020)  Meds: - Apixaban 5mg BID - Atorva 20mg - Metoprolol succinate 50mg - ? Enalapril 10mg or lisinopril-HCTZ 10-12.5mg - Entresto 24-26mg BID - Pantoprazole - Methocarbamol - Famotidine - Advair - Ferrex 150mg  From Dr. Quiles' last note CVA - right middle cerebral artery thrombectomy - 7/2019 - stable HTN - controlled h/o CAD - s/p CABG around 2010 - stable CVA with parlysis - around 2007 - stable NSVT - stable Ischemic heart disease -stable Venous thrombosis - 1/2019 - stable Leg pain - stable Paroxysmal atrial fibrillaiton - CHADSVASC2 - 4 - stable PPM placed 4/5/2022 - stable Active GI bleed - recurrent

## 2024-09-16 NOTE — ASSESSMENT
[FreeTextEntry1] : Mr. 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 and HTN presenting to re-establish care with a primary cardiologist.  Impression: (1) HFrEF 2/2 mixed CM (ischemic, arrhythmia), euvolemic on exam, [facility] 5/2024 with stable disease (2) CAD s/p CABG 2010 in [place] (3) Paroxysmal AF on AC, CHADSVASc at least 6 (Age+2, CAD, CVA+2, HTN), stable Hgb since recent GIB (4) NSVT, device interrogation with several episodes, longest lasting 9 seconds (5) High degree AVB s/p PPM 2022 (6) CVA x2 (2007, 2019 s/p R MCA thrombectomy) (7) HTN (8) Anemia  Plan: - Continue apixaban for thromboembolic prophylaxis. Has a very high CHADSVASc score. - Ongoing EP follow up for device management. Plan for CRT-P. Consideration for LAAC if bleeding becomes an issue again. Has been tolerating AC without issue since his bleeding episode. - Will continue metoprolol succinate, farxiga 10mg, and entresto 24-26mg BID. Hypotension limits titration of further GDMT.  RTC 3 months with labs.

## 2024-09-18 ENCOUNTER — APPOINTMENT (OUTPATIENT)
Dept: ELECTROPHYSIOLOGY | Facility: CLINIC | Age: 74
End: 2024-09-18

## 2024-09-18 VITALS
HEART RATE: 75 BPM | WEIGHT: 190 LBS | SYSTOLIC BLOOD PRESSURE: 120 MMHG | DIASTOLIC BLOOD PRESSURE: 60 MMHG | BODY MASS INDEX: 27.2 KG/M2 | HEIGHT: 70 IN

## 2024-09-18 PROCEDURE — 99214 OFFICE O/P EST MOD 30 MIN: CPT | Mod: 25

## 2024-09-18 PROCEDURE — 93280 PM DEVICE PROGR EVAL DUAL: CPT

## 2024-09-18 PROCEDURE — 93000 ELECTROCARDIOGRAM COMPLETE: CPT | Mod: 59

## 2024-09-18 NOTE — PROCEDURE
[Atrial Fibrillation] : atrial fibrillation [Pacemaker] : pacemaker [Voltage: ___ volts] : Voltage was [unfilled] volts [Threshold Testing Performed] : Threshold testing was performed [Lead Imp:  ___ohms] : lead impedance was [unfilled] ohms [Sensing Amplitude ___mv] : sensing amplitude was [unfilled] mv [___V @] : [unfilled] V [___ ms] : [unfilled] ms [None] : none [de-identified] : medtronic [de-identified] : dipesh [de-identified] : [id number] [de-identified] : 4/5/2022 [de-identified] : DDIR [de-identified] :  [de-identified] : 9.5 years [de-identified] :  - 94.3% AP - 5.7%  Occasional PVC's Fib Wave - 4.6 mV R-Wave - PACED AT/AF [place] - 94.2% ATP Attempted - Unsuccessful  On Remote

## 2024-10-17 NOTE — PROGRESS NOTE ADULT - SUBJECTIVE AND OBJECTIVE BOX
24H events:    Patient is a 73y old Male who presents with a chief complaint of elective cath (11 May 2024 12:07)    Primary diagnosis of       Today is hospital day 2d.   This morning patient was seen and examined at bedside, resting comfortably in bed.    No acute or major events overnight.    Code Status: FULL    Family communication:  Contact date:  Name of person contacted:  Relationship to patient:  Communication details:  What matters most:    PAST MEDICAL & SURGICAL HISTORY  CAD (coronary artery disease)  s/p stenting and CABG 10 years ago    HLD (hyperlipidemia)    Stroke  10 years ago as per MD (2006, 2009, 2019- HAS FORGETFULNESS AND MOOD CHANGES SINCE LAST STROKE)    DVT, lower extremity  January 2019    Afib    HTN (hypertension)    S/P CABG (coronary artery bypass graft)    History of hernia repair    History of surgery  THROMBECTOMY    Pacemaker      SOCIAL HISTORY:  Social History:      ALLERGIES:  No Known Allergies    MEDICATIONS:  STANDING MEDICATIONS  aspirin  chewable 81 milliGRAM(s) Oral daily  atorvastatin 20 milliGRAM(s) Oral at bedtime  chlorhexidine 4% Liquid 1 Application(s) Topical once  enalapril 10 milliGRAM(s) Oral daily  famotidine    Tablet 40 milliGRAM(s) Oral daily  metoprolol succinate ER 50 milliGRAM(s) Oral daily    PRN MEDICATIONS    VITALS:   T(F): 98  HR: 80  BP: 114/53  RR: 18  SpO2: 100%    PHYSICAL EXAM:  GENERAL:   ( x ) NAD, lying in bed comfortably     (  ) obtunded     (  ) lethargic     (  ) somnolent    HEAD:   ( x ) Atraumatic     (  ) hematoma     (  ) laceration (specify location:       )     NECK:  ( x ) Supple     (  ) neck stiffness     (  ) nuchal rigidity     (  )  no JVD     (  ) JVD present ( -- cm)    HEART:  Rate -->     ( x ) normal rate     (  ) bradycardic     (  ) tachycardic  Rhythm -->     ( x ) regular     (  ) regularly irregular     (  ) irregularly irregular  Murmurs -->     ( x ) normal s1s2     (  ) systolic murmur     (  ) diastolic murmur     (  ) continuous murmur      (  ) S3 present     (  ) S4 present    LUNGS:   ( x ) Unlabored respirations     (  ) tachypnea  ( x ) B/L air entry     (  ) decreased breath sounds in:  (location     )    (  ) no adventitious sound     (  ) crackles     (  ) wheezing      (  ) rhonchi      (specify location:       )  (  ) chest wall tenderness (specify location:       )    ABDOMEN:   ( x ) Soft     (  ) tense   |   ( x ) nondistended     (  ) distended   |   (  ) +BS     (  ) hypoactive bowel sounds     (  ) hyperactive bowel sounds  ( x ) nontender     (  ) RUQ tenderness     (  ) RLQ tenderness     (  ) LLQ tenderness     (  ) epigastric tenderness     (  ) diffuse tenderness  (  ) Splenomegaly      (  ) Hepatomegaly      (  ) Jaundice     (  ) ecchymosis     EXTREMITIES:  ( x ) Normal     (  ) Rash     (  ) ecchymosis     (  ) varicose veins      (  ) pitting edema     (  ) non-pitting edema   (  ) ulceration     (  ) gangrene:     (location:     )    NERVOUS SYSTEM:    ( x ) A&Ox3     (  ) confused     (  ) lethargic  CN II-XII:     (  ) Intact     (  ) deficits found     (Specify:     )   Upper extremities:     (  ) no sensorimotor deficits     (  ) weakness     (  ) loss of proprioception/vibration     (  ) loss of touch/temperature (specify:    )  Lower extremities:     (  ) no sensorimotor deficits     (  ) weakness     (  ) loss of proprioception/vibration     (  ) loss of touch/temperature (specify:    )    SKIN:   ( x ) No rashes or lesions     (  ) maculopapular rash     (  ) pustules     (  ) vesicles     (  ) ulcer     (  ) ecchymosis     (specify location:     )    AMPAC score:    (  ) Indwelling Bronson Catheter:   Date inserted:    Reason (  ) Critical illness     (  ) urinary retention    (  ) Accurate Ins/Outs Monitoring     (  ) CMO patient    (  ) Central Line:   Date inserted:  Location: (  ) Right IJ     (  ) Left IJ     (  ) Right Fem     (  ) Left Fem    (  ) SPC        (  ) pigtail       (  ) PEG tube       (  ) colostomy       (  ) jejunostomy  (  ) U-Dall    LABS:                        8.0    4.79  )-----------( 230      ( 11 May 2024 08:01 )             28.1     05-11    141  |  108  |  26<H>  ----------------------------<  105<H>  4.2   |  25  |  0.9    Ca    8.7      11 May 2024 08:01  Mg     1.8     05-11    TPro  6.4  /  Alb  3.5  /  TBili  0.4  /  DBili  x   /  AST  73<H>  /  ALT  69<H>  /  AlkPhos  161<H>  05-11      Urinalysis Basic - ( 11 May 2024 08:01 )    Color: x / Appearance: x / SG: x / pH: x  Gluc: 105 mg/dL / Ketone: x  / Bili: x / Urobili: x   Blood: x / Protein: x / Nitrite: x   Leuk Esterase: x / RBC: x / WBC x   Sq Epi: x / Non Sq Epi: x / Bacteria: x                RADIOLOGY:  CXR      CT           Warm compresses to right side of face for TMJ pain, ibuprofen for discomfort.    Start nasal saline spray, 2 sprays each nostril 3-4 times day.  If no improvement in 2 weeks, start flonase, 2 sprays each nostril once daily in addition to the nasal saline.

## 2024-11-15 ENCOUNTER — OUTPATIENT (OUTPATIENT)
Dept: OUTPATIENT SERVICES | Facility: HOSPITAL | Age: 74
LOS: 1 days | End: 2024-11-15
Payer: MEDICARE

## 2024-11-15 VITALS
OXYGEN SATURATION: 97 % | HEIGHT: 67.72 IN | RESPIRATION RATE: 16 BRPM | WEIGHT: 184.97 LBS | DIASTOLIC BLOOD PRESSURE: 78 MMHG | SYSTOLIC BLOOD PRESSURE: 106 MMHG | TEMPERATURE: 98 F | HEART RATE: 70 BPM

## 2024-11-15 DIAGNOSIS — Z95.0 PRESENCE OF CARDIAC PACEMAKER: Chronic | ICD-10-CM

## 2024-11-15 DIAGNOSIS — I25.10 ATHEROSCLEROTIC HEART DISEASE OF NATIVE CORONARY ARTERY WITHOUT ANGINA PECTORIS: ICD-10-CM

## 2024-11-15 DIAGNOSIS — Z98.890 OTHER SPECIFIED POSTPROCEDURAL STATES: Chronic | ICD-10-CM

## 2024-11-15 DIAGNOSIS — Z01.818 ENCOUNTER FOR OTHER PREPROCEDURAL EXAMINATION: ICD-10-CM

## 2024-11-15 DIAGNOSIS — Z95.1 PRESENCE OF AORTOCORONARY BYPASS GRAFT: Chronic | ICD-10-CM

## 2024-11-15 LAB
ALBUMIN SERPL ELPH-MCNC: 4 G/DL — SIGNIFICANT CHANGE UP (ref 3.5–5.2)
ALP SERPL-CCNC: 139 U/L — HIGH (ref 30–115)
ALT FLD-CCNC: 45 U/L — HIGH (ref 0–41)
ANION GAP SERPL CALC-SCNC: 7 MMOL/L — SIGNIFICANT CHANGE UP (ref 7–14)
APPEARANCE UR: CLEAR — SIGNIFICANT CHANGE UP
AST SERPL-CCNC: 39 U/L — SIGNIFICANT CHANGE UP (ref 0–41)
BACTERIA # UR AUTO: NEGATIVE /HPF — SIGNIFICANT CHANGE UP
BASOPHILS # BLD AUTO: 0.03 K/UL — SIGNIFICANT CHANGE UP (ref 0–0.2)
BASOPHILS NFR BLD AUTO: 0.4 % — SIGNIFICANT CHANGE UP (ref 0–1)
BILIRUB SERPL-MCNC: 1 MG/DL — SIGNIFICANT CHANGE UP (ref 0.2–1.2)
BILIRUB UR-MCNC: NEGATIVE — SIGNIFICANT CHANGE UP
BLD GP AB SCN SERPL QL: SIGNIFICANT CHANGE UP
BUN SERPL-MCNC: 21 MG/DL — HIGH (ref 10–20)
CALCIUM SERPL-MCNC: 9.7 MG/DL — SIGNIFICANT CHANGE UP (ref 8.4–10.5)
CAST: 1 /LPF — SIGNIFICANT CHANGE UP (ref 0–4)
CHLORIDE SERPL-SCNC: 108 MMOL/L — SIGNIFICANT CHANGE UP (ref 98–110)
CO2 SERPL-SCNC: 29 MMOL/L — SIGNIFICANT CHANGE UP (ref 17–32)
COLOR SPEC: SIGNIFICANT CHANGE UP
CREAT SERPL-MCNC: 1 MG/DL — SIGNIFICANT CHANGE UP (ref 0.7–1.5)
DIFF PNL FLD: NEGATIVE — SIGNIFICANT CHANGE UP
EGFR: 78 ML/MIN/1.73M2 — SIGNIFICANT CHANGE UP
EOSINOPHIL # BLD AUTO: 0.49 K/UL — SIGNIFICANT CHANGE UP (ref 0–0.7)
EOSINOPHIL NFR BLD AUTO: 6.8 % — SIGNIFICANT CHANGE UP (ref 0–8)
GLUCOSE SERPL-MCNC: 136 MG/DL — HIGH (ref 70–99)
GLUCOSE UR QL: >=1000 MG/DL
HCT VFR BLD CALC: 48.7 % — SIGNIFICANT CHANGE UP (ref 42–52)
HGB BLD-MCNC: 16.2 G/DL — SIGNIFICANT CHANGE UP (ref 14–18)
IMM GRANULOCYTES NFR BLD AUTO: 0.3 % — SIGNIFICANT CHANGE UP (ref 0.1–0.3)
KETONES UR-MCNC: NEGATIVE MG/DL — SIGNIFICANT CHANGE UP
LEUKOCYTE ESTERASE UR-ACNC: NEGATIVE — SIGNIFICANT CHANGE UP
LYMPHOCYTES # BLD AUTO: 2.32 K/UL — SIGNIFICANT CHANGE UP (ref 1.2–3.4)
LYMPHOCYTES # BLD AUTO: 32.3 % — SIGNIFICANT CHANGE UP (ref 20.5–51.1)
MCHC RBC-ENTMCNC: 30.9 PG — SIGNIFICANT CHANGE UP (ref 27–31)
MCHC RBC-ENTMCNC: 33.3 G/DL — SIGNIFICANT CHANGE UP (ref 32–37)
MCV RBC AUTO: 92.9 FL — SIGNIFICANT CHANGE UP (ref 80–94)
MONOCYTES # BLD AUTO: 0.83 K/UL — HIGH (ref 0.1–0.6)
MONOCYTES NFR BLD AUTO: 11.6 % — HIGH (ref 1.7–9.3)
MRSA PCR RESULT.: NEGATIVE — SIGNIFICANT CHANGE UP
NEUTROPHILS # BLD AUTO: 3.49 K/UL — SIGNIFICANT CHANGE UP (ref 1.4–6.5)
NEUTROPHILS NFR BLD AUTO: 48.6 % — SIGNIFICANT CHANGE UP (ref 42.2–75.2)
NITRITE UR-MCNC: NEGATIVE — SIGNIFICANT CHANGE UP
NRBC # BLD: 0 /100 WBCS — SIGNIFICANT CHANGE UP (ref 0–0)
PH UR: 5 — SIGNIFICANT CHANGE UP (ref 5–8)
PLATELET # BLD AUTO: 187 K/UL — SIGNIFICANT CHANGE UP (ref 130–400)
PMV BLD: 11.5 FL — HIGH (ref 7.4–10.4)
POTASSIUM SERPL-MCNC: 4.5 MMOL/L — SIGNIFICANT CHANGE UP (ref 3.5–5)
POTASSIUM SERPL-SCNC: 4.5 MMOL/L — SIGNIFICANT CHANGE UP (ref 3.5–5)
PROT SERPL-MCNC: 7.4 G/DL — SIGNIFICANT CHANGE UP (ref 6–8)
PROT UR-MCNC: 30 MG/DL
RBC # BLD: 5.24 M/UL — SIGNIFICANT CHANGE UP (ref 4.7–6.1)
RBC # FLD: 13.7 % — SIGNIFICANT CHANGE UP (ref 11.5–14.5)
RBC CASTS # UR COMP ASSIST: 2 /HPF — SIGNIFICANT CHANGE UP (ref 0–4)
SODIUM SERPL-SCNC: 144 MMOL/L — SIGNIFICANT CHANGE UP (ref 135–146)
SP GR SPEC: >1.03 — HIGH (ref 1–1.03)
SQUAMOUS # UR AUTO: 0 /HPF — SIGNIFICANT CHANGE UP (ref 0–5)
UROBILINOGEN FLD QL: 0.2 MG/DL — SIGNIFICANT CHANGE UP (ref 0.2–1)
WBC # BLD: 7.18 K/UL — SIGNIFICANT CHANGE UP (ref 4.8–10.8)
WBC # FLD AUTO: 7.18 K/UL — SIGNIFICANT CHANGE UP (ref 4.8–10.8)
WBC UR QL: 2 /HPF — SIGNIFICANT CHANGE UP (ref 0–5)

## 2024-11-15 PROCEDURE — 86850 RBC ANTIBODY SCREEN: CPT

## 2024-11-15 PROCEDURE — 87641 MR-STAPH DNA AMP PROBE: CPT

## 2024-11-15 PROCEDURE — 86901 BLOOD TYPING SEROLOGIC RH(D): CPT

## 2024-11-15 PROCEDURE — 80053 COMPREHEN METABOLIC PANEL: CPT

## 2024-11-15 PROCEDURE — 87086 URINE CULTURE/COLONY COUNT: CPT

## 2024-11-15 PROCEDURE — 81001 URINALYSIS AUTO W/SCOPE: CPT

## 2024-11-15 PROCEDURE — 86900 BLOOD TYPING SEROLOGIC ABO: CPT

## 2024-11-15 PROCEDURE — 85025 COMPLETE CBC W/AUTO DIFF WBC: CPT

## 2024-11-15 PROCEDURE — 36415 COLL VENOUS BLD VENIPUNCTURE: CPT

## 2024-11-15 PROCEDURE — 99214 OFFICE O/P EST MOD 30 MIN: CPT | Mod: 25

## 2024-11-15 PROCEDURE — 87640 STAPH A DNA AMP PROBE: CPT

## 2024-11-15 NOTE — H&P PST ADULT - HISTORY OF PRESENT ILLNESS
74YR old male Dominican speaking -presents to PAST in prep for PPM CRT-P UPGRADE. States his daughter speaks English and visits everyday - she will set up his meds as per verbal instructions given via . Copy of written instructions also given to patient.  Denies COVID/URI S/S.  Anesthesia Alert  NO--Difficult Airway  NO--History of neck surgery or radiation  YES--Limited ROM of neck  NO--History of Malignant hyperthermia  NO--Personal or family history of Pseudocholinesterase deficiency  NO--Prior Anesthesia Complication  NO--Latex Allergy  NO--Loose teeth  NO--History of Rheumatoid Arthritis  NO--[place]  YES Bleeding risk  yes [place]  --Other_____

## 2024-11-15 NOTE — H&P PST ADULT - NSICDXPASTMEDICALHX_GEN_ALL_CORE_FT
PAST MEDICAL HISTORY:  Afib     Anemia     CAD (coronary artery disease) s/p stenting and CABG 10 years ago    Diabetes     DVT, lower extremity January 2019    H/O transfusion     HLD (hyperlipidemia)     [place] (hard of hearing)     HTN (hypertension)     Stroke 10 years ago as per MD (2006, 2009, 2019- HAS FORGETFULNESS AND MOOD CHANGES SINCE LAST STROKE)

## 2024-11-16 DIAGNOSIS — I25.10 ATHEROSCLEROTIC HEART DISEASE OF NATIVE CORONARY ARTERY WITHOUT ANGINA PECTORIS: ICD-10-CM

## 2024-11-16 DIAGNOSIS — Z01.818 ENCOUNTER FOR OTHER PREPROCEDURAL EXAMINATION: ICD-10-CM

## 2024-11-17 LAB
CULTURE RESULTS: SIGNIFICANT CHANGE UP
SPECIMEN SOURCE: SIGNIFICANT CHANGE UP

## 2024-11-22 ENCOUNTER — APPOINTMENT (OUTPATIENT)
Dept: ELECTROPHYSIOLOGY | Facility: HOSPITAL | Age: 74
End: 2024-11-22

## 2024-11-22 RX ORDER — DAPAGLIFLOZIN 10 MG/1
1 TABLET, FILM COATED ORAL
Refills: 0 | DISCHARGE

## 2024-11-22 RX ORDER — APIXABAN 5 MG/1
1 TABLET, FILM COATED ORAL
Refills: 0 | DISCHARGE

## 2024-11-22 RX ORDER — CITALOPRAM HYDROBROMIDE 20 MG/1
1 TABLET, FILM COATED ORAL
Refills: 0 | DISCHARGE

## 2024-11-22 RX ORDER — SACUBITRIL AND VALSARTAN 97; 103 MG/1; MG/1
1 TABLET, FILM COATED ORAL
Refills: 0 | DISCHARGE

## 2024-12-18 ENCOUNTER — NON-APPOINTMENT (OUTPATIENT)
Age: 74
End: 2024-12-18

## 2024-12-18 ENCOUNTER — APPOINTMENT (OUTPATIENT)
Dept: CARDIOLOGY | Facility: CLINIC | Age: 74
End: 2024-12-18
Payer: MEDICARE

## 2024-12-18 PROCEDURE — 93294 REM INTERROG EVL PM/LDLS PM: CPT

## 2024-12-18 PROCEDURE — 93296 REM INTERROG EVL PM/IDS: CPT

## 2025-01-24 ENCOUNTER — APPOINTMENT (OUTPATIENT)
Dept: CARDIOLOGY | Facility: CLINIC | Age: 75
End: 2025-01-24
Payer: MEDICARE

## 2025-01-24 VITALS
BODY MASS INDEX: 26.48 KG/M2 | HEIGHT: 70 IN | HEART RATE: 76 BPM | WEIGHT: 185 LBS | DIASTOLIC BLOOD PRESSURE: 70 MMHG | SYSTOLIC BLOOD PRESSURE: 110 MMHG

## 2025-01-24 DIAGNOSIS — I25.10 ATHEROSCLEROTIC HEART DISEASE OF NATIVE CORONARY ARTERY W/OUT ANGINA PECTORIS: ICD-10-CM

## 2025-01-24 DIAGNOSIS — I48.0 PAROXYSMAL ATRIAL FIBRILLATION: ICD-10-CM

## 2025-01-24 DIAGNOSIS — I10 ESSENTIAL (PRIMARY) HYPERTENSION: ICD-10-CM

## 2025-01-24 DIAGNOSIS — I50.22 CHRONIC SYSTOLIC (CONGESTIVE) HEART FAILURE: ICD-10-CM

## 2025-01-24 PROCEDURE — 93000 ELECTROCARDIOGRAM COMPLETE: CPT

## 2025-01-24 PROCEDURE — 99214 OFFICE O/P EST MOD 30 MIN: CPT | Mod: 25

## 2025-01-29 ENCOUNTER — APPOINTMENT (OUTPATIENT)
Dept: ELECTROPHYSIOLOGY | Facility: CLINIC | Age: 75
End: 2025-01-29

## 2025-01-29 VITALS
SYSTOLIC BLOOD PRESSURE: 106 MMHG | WEIGHT: 185 LBS | DIASTOLIC BLOOD PRESSURE: 78 MMHG | HEART RATE: 86 BPM | BODY MASS INDEX: 26.48 KG/M2 | HEIGHT: 70 IN

## 2025-01-29 PROCEDURE — 93280 PM DEVICE PROGR EVAL DUAL: CPT

## 2025-01-29 PROCEDURE — 93000 ELECTROCARDIOGRAM COMPLETE: CPT | Mod: 59

## 2025-01-29 PROCEDURE — 99214 OFFICE O/P EST MOD 30 MIN: CPT | Mod: 25

## 2025-02-12 ENCOUNTER — APPOINTMENT (OUTPATIENT)
Dept: CARDIOLOGY | Facility: CLINIC | Age: 75
End: 2025-02-12
Payer: MEDICARE

## 2025-02-12 PROCEDURE — 93306 TTE W/DOPPLER COMPLETE: CPT

## 2025-02-24 NOTE — PHYSICAL EXAM
Pt c/o feeling dizzy and shaky after receiving ss + standard insulin. Bs checked and was 66. Pb crackers and oj given. Md saunders notified of episode to adjust coverage.    [FreeTextEntry1] : a+Ox2\par naming and repetition normal\par No facial\par EOMI\par Movements symmetric\par FTN nL

## 2025-02-28 ENCOUNTER — APPOINTMENT (OUTPATIENT)
Dept: ELECTROPHYSIOLOGY | Facility: CLINIC | Age: 75
End: 2025-02-28
Payer: MEDICARE

## 2025-02-28 DIAGNOSIS — R93.1 ABNORMAL FINDINGS ON DIAGNOSTIC IMAGING OF HEART AND CORONARY CIRCULATION: ICD-10-CM

## 2025-02-28 PROCEDURE — 99211 OFF/OP EST MAY X REQ PHY/QHP: CPT | Mod: 93

## 2025-03-06 PROBLEM — R93.1 ECHOCARDIOGRAM ABNORMAL: Status: ACTIVE | Noted: 2025-03-06

## 2025-03-21 ENCOUNTER — APPOINTMENT (OUTPATIENT)
Dept: CARDIOLOGY | Facility: CLINIC | Age: 75
End: 2025-03-21
Payer: MEDICARE

## 2025-03-21 VITALS
HEART RATE: 78 BPM | BODY MASS INDEX: 26.48 KG/M2 | WEIGHT: 185 LBS | HEIGHT: 70 IN | SYSTOLIC BLOOD PRESSURE: 118 MMHG | DIASTOLIC BLOOD PRESSURE: 70 MMHG

## 2025-03-21 DIAGNOSIS — I25.10 ATHEROSCLEROTIC HEART DISEASE OF NATIVE CORONARY ARTERY W/OUT ANGINA PECTORIS: ICD-10-CM

## 2025-03-21 DIAGNOSIS — I48.0 PAROXYSMAL ATRIAL FIBRILLATION: ICD-10-CM

## 2025-03-21 DIAGNOSIS — Z00.00 ENCOUNTER FOR GENERAL ADULT MEDICAL EXAMINATION W/OUT ABNORMAL FINDINGS: ICD-10-CM

## 2025-03-21 DIAGNOSIS — I50.22 CHRONIC SYSTOLIC (CONGESTIVE) HEART FAILURE: ICD-10-CM

## 2025-03-21 PROCEDURE — 99214 OFFICE O/P EST MOD 30 MIN: CPT | Mod: 25

## 2025-03-21 PROCEDURE — 93000 ELECTROCARDIOGRAM COMPLETE: CPT

## 2025-03-21 RX ORDER — FUROSEMIDE 40 MG/1
40 TABLET ORAL
Qty: 90 | Refills: 3 | Status: ACTIVE | COMMUNITY
Start: 2025-03-21 | End: 1900-01-01

## 2025-04-25 ENCOUNTER — APPOINTMENT (OUTPATIENT)
Dept: NEUROLOGY | Facility: CLINIC | Age: 75
End: 2025-04-25

## 2025-04-30 ENCOUNTER — APPOINTMENT (OUTPATIENT)
Dept: NEUROLOGY | Facility: CLINIC | Age: 75
End: 2025-04-30

## 2025-04-30 ENCOUNTER — NON-APPOINTMENT (OUTPATIENT)
Age: 75
End: 2025-04-30

## 2025-04-30 ENCOUNTER — APPOINTMENT (OUTPATIENT)
Dept: CARDIOLOGY | Facility: CLINIC | Age: 75
End: 2025-04-30
Payer: MEDICARE

## 2025-04-30 PROCEDURE — 93296 REM INTERROG EVL PM/IDS: CPT

## 2025-04-30 PROCEDURE — 93294 REM INTERROG EVL PM/LDLS PM: CPT

## 2025-05-27 ENCOUNTER — RX RENEWAL (OUTPATIENT)
Age: 75
End: 2025-05-27

## 2025-06-20 ENCOUNTER — APPOINTMENT (OUTPATIENT)
Dept: CARDIOLOGY | Facility: CLINIC | Age: 75
End: 2025-06-20
Payer: MEDICARE

## 2025-06-20 VITALS
SYSTOLIC BLOOD PRESSURE: 112 MMHG | HEIGHT: 70 IN | BODY MASS INDEX: 27.2 KG/M2 | WEIGHT: 190 LBS | HEART RATE: 89 BPM | DIASTOLIC BLOOD PRESSURE: 70 MMHG

## 2025-06-20 PROCEDURE — 99214 OFFICE O/P EST MOD 30 MIN: CPT | Mod: 25

## 2025-06-20 PROCEDURE — 93000 ELECTROCARDIOGRAM COMPLETE: CPT

## 2025-06-20 RX ORDER — FUROSEMIDE 40 MG/1
40 TABLET ORAL DAILY
Qty: 30 | Refills: 1 | Status: ACTIVE | COMMUNITY
Start: 2025-06-20 | End: 1900-01-01

## 2025-06-20 RX ORDER — EZETIMIBE 10 MG/1
10 TABLET ORAL
Qty: 90 | Refills: 2 | Status: ACTIVE | COMMUNITY
Start: 2025-06-20 | End: 1900-01-01

## 2025-07-23 ENCOUNTER — RX RENEWAL (OUTPATIENT)
Age: 75
End: 2025-07-23

## 2025-07-30 ENCOUNTER — APPOINTMENT (OUTPATIENT)
Dept: ELECTROPHYSIOLOGY | Facility: CLINIC | Age: 75
End: 2025-07-30
Payer: MEDICARE

## 2025-07-30 VITALS
SYSTOLIC BLOOD PRESSURE: 120 MMHG | DIASTOLIC BLOOD PRESSURE: 72 MMHG | WEIGHT: 185 LBS | HEIGHT: 70 IN | HEART RATE: 70 BPM | BODY MASS INDEX: 26.48 KG/M2

## 2025-07-30 PROCEDURE — 99214 OFFICE O/P EST MOD 30 MIN: CPT | Mod: 25

## 2025-07-30 PROCEDURE — 93000 ELECTROCARDIOGRAM COMPLETE: CPT | Mod: 59

## 2025-07-30 PROCEDURE — 93280 PM DEVICE PROGR EVAL DUAL: CPT

## 2025-07-30 RX ORDER — ASPIRIN 81 MG/1
81 TABLET, COATED ORAL DAILY
Refills: 0 | Status: ACTIVE | COMMUNITY

## 2025-08-21 ENCOUNTER — RX RENEWAL (OUTPATIENT)
Age: 75
End: 2025-08-21

## 2025-09-18 ENCOUNTER — RX RENEWAL (OUTPATIENT)
Age: 75
End: 2025-09-18